# Patient Record
Sex: MALE | Race: WHITE | NOT HISPANIC OR LATINO | Employment: FULL TIME | URBAN - METROPOLITAN AREA
[De-identification: names, ages, dates, MRNs, and addresses within clinical notes are randomized per-mention and may not be internally consistent; named-entity substitution may affect disease eponyms.]

---

## 2023-01-27 PROBLEM — E78.00 HYPERCHOLESTEROLEMIA: Status: ACTIVE | Noted: 2023-01-27

## 2023-01-27 PROBLEM — I10 HYPERTENSION: Status: ACTIVE | Noted: 2023-01-27

## 2023-01-27 PROBLEM — C91.10 CLL (CHRONIC LYMPHOCYTIC LEUKEMIA) (MULTI): Status: ACTIVE | Noted: 2023-01-27

## 2023-01-27 PROBLEM — E66.812 CLASS 2 SEVERE OBESITY DUE TO EXCESS CALORIES WITH SERIOUS COMORBIDITY AND BODY MASS INDEX (BMI) OF 38.0 TO 38.9 IN ADULT: Status: ACTIVE | Noted: 2023-01-27

## 2023-01-27 PROBLEM — K76.0 FATTY LIVER: Status: ACTIVE | Noted: 2023-01-27

## 2023-01-27 PROBLEM — J40 SINOBRONCHITIS: Status: ACTIVE | Noted: 2023-01-27

## 2023-01-27 PROBLEM — R79.89 ELEVATED LFTS: Status: ACTIVE | Noted: 2023-01-27

## 2023-01-27 PROBLEM — J11.1 FLU: Status: ACTIVE | Noted: 2023-01-27

## 2023-01-27 PROBLEM — E66.01 CLASS 2 SEVERE OBESITY DUE TO EXCESS CALORIES WITH SERIOUS COMORBIDITY AND BODY MASS INDEX (BMI) OF 38.0 TO 38.9 IN ADULT (MULTI): Status: ACTIVE | Noted: 2023-01-27

## 2023-01-27 PROBLEM — J32.9 SINOBRONCHITIS: Status: ACTIVE | Noted: 2023-01-27

## 2023-01-27 PROBLEM — D17.1 LIPOMA OF CHEST WALL: Status: ACTIVE | Noted: 2023-01-27

## 2023-01-27 PROBLEM — R22.2 MASS OF CHEST WALL: Status: ACTIVE | Noted: 2023-01-27

## 2023-01-27 PROBLEM — R91.1 PULMONARY NODULE: Status: ACTIVE | Noted: 2023-01-27

## 2023-01-27 PROBLEM — J30.9 ALLERGIC RHINITIS: Status: ACTIVE | Noted: 2023-01-27

## 2023-01-27 PROBLEM — F41.9 ANXIETY DISORDER: Status: ACTIVE | Noted: 2023-01-27

## 2023-01-27 RX ORDER — LISINOPRIL 40 MG/1
40 TABLET ORAL DAILY
COMMUNITY
Start: 2014-12-22 | End: 2023-04-14 | Stop reason: SDUPTHER

## 2023-01-27 RX ORDER — ESCITALOPRAM OXALATE 10 MG/1
10 TABLET ORAL DAILY
COMMUNITY
Start: 2022-02-15 | End: 2023-04-14 | Stop reason: SDUPTHER

## 2023-01-27 RX ORDER — AMLODIPINE BESYLATE 10 MG/1
10 TABLET ORAL DAILY
COMMUNITY
Start: 2021-04-12 | End: 2023-04-14 | Stop reason: SDUPTHER

## 2023-01-27 RX ORDER — FENOFIBRIC ACID 135 MG/1
135 CAPSULE, DELAYED RELEASE ORAL DAILY
COMMUNITY
Start: 2015-01-26 | End: 2023-04-14 | Stop reason: SDUPTHER

## 2023-03-13 ENCOUNTER — APPOINTMENT (OUTPATIENT)
Dept: PRIMARY CARE | Facility: CLINIC | Age: 48
End: 2023-03-13
Payer: COMMERCIAL

## 2023-04-14 ENCOUNTER — OFFICE VISIT (OUTPATIENT)
Dept: PRIMARY CARE | Facility: CLINIC | Age: 48
End: 2023-04-14
Payer: COMMERCIAL

## 2023-04-14 VITALS
BODY MASS INDEX: 36.88 KG/M2 | HEART RATE: 89 BPM | SYSTOLIC BLOOD PRESSURE: 140 MMHG | HEIGHT: 70 IN | WEIGHT: 257.6 LBS | TEMPERATURE: 97.6 F | DIASTOLIC BLOOD PRESSURE: 98 MMHG

## 2023-04-14 DIAGNOSIS — I10 HYPERTENSION, UNSPECIFIED TYPE: Primary | ICD-10-CM

## 2023-04-14 DIAGNOSIS — F41.1 GENERALIZED ANXIETY DISORDER: ICD-10-CM

## 2023-04-14 DIAGNOSIS — E78.00 HYPERCHOLESTEROLEMIA: ICD-10-CM

## 2023-04-14 PROBLEM — J32.9 SINOBRONCHITIS: Status: RESOLVED | Noted: 2023-01-27 | Resolved: 2023-04-14

## 2023-04-14 PROBLEM — J40 SINOBRONCHITIS: Status: RESOLVED | Noted: 2023-01-27 | Resolved: 2023-04-14

## 2023-04-14 PROCEDURE — 1036F TOBACCO NON-USER: CPT | Performed by: FAMILY MEDICINE

## 2023-04-14 PROCEDURE — 99214 OFFICE O/P EST MOD 30 MIN: CPT | Performed by: FAMILY MEDICINE

## 2023-04-14 PROCEDURE — 3077F SYST BP >= 140 MM HG: CPT | Performed by: FAMILY MEDICINE

## 2023-04-14 PROCEDURE — 3080F DIAST BP >= 90 MM HG: CPT | Performed by: FAMILY MEDICINE

## 2023-04-14 RX ORDER — ESCITALOPRAM OXALATE 10 MG/1
10 TABLET ORAL DAILY
Qty: 30 TABLET | Refills: 6 | Status: SHIPPED | OUTPATIENT
Start: 2023-04-14 | End: 2023-11-13

## 2023-04-14 RX ORDER — AMLODIPINE BESYLATE 10 MG/1
10 TABLET ORAL DAILY
Qty: 30 TABLET | Refills: 6 | Status: SHIPPED | OUTPATIENT
Start: 2023-04-14 | End: 2023-11-13

## 2023-04-14 RX ORDER — LISINOPRIL 40 MG/1
60 TABLET ORAL DAILY
Qty: 45 TABLET | Refills: 6 | Status: SHIPPED | OUTPATIENT
Start: 2023-04-14 | End: 2023-11-13

## 2023-04-14 RX ORDER — FENOFIBRIC ACID 135 MG/1
135 CAPSULE, DELAYED RELEASE ORAL DAILY
Qty: 30 CAPSULE | Refills: 6 | Status: SHIPPED | OUTPATIENT
Start: 2023-04-14 | End: 2023-11-13

## 2023-04-14 NOTE — PROGRESS NOTES
Brandon Snider is a 48 y.o. male here today for recheck.    HTN recheck -- Patient denies chest pain, SOB, edema, palpitations on review.  Taking medication correctly and denies any side effects.  His blood pressure is elevated today in the office.  He is not checking his blood pressure at home.  He says that he is taking his medications correctly and he took them today and yesterday.    HLD recheck -- Patient taking medications correctly.  No SE's of muscle pain or joint pain.  No CP, edema, myalgias.      Mood disorder recheck -- Patient feels like condition is well controlled.  Has good control of mood and emotional reactions.  No SE's or problems with medications.  No homicidal or suicidal ideation.  Patient wishes to continue same medications.        Objective    Visit Vitals  BP (!) 140/98   Pulse 89   Temp 36.4 °C (97.6 °F)     Body mass index is 36.96 kg/m².     Physical Exam   General - Not in acute distress and cooperative.  Build & Nutrition - Well developed  Posture - Normal  Gait - Normal  Mental Status - alert and oriented x 3    Head - Normocephalic    Neck - Thyroid normal size    Eyes - Bilateral - Sclera clear and lids pink without edema or mass.      Skin - Warm and dry with no rashes on visible skin    Lungs - Clear to auscultation and normal breathing effort    Cardiovascular - RRR and no murmurs, rubs or thrill.    Peripheral Vascular - Bilateral - no edema present    Neuropsychiatric - normal mood and affect        Assessment    1. Hypertension, unspecified type  lisinopril 40 mg tablet, amLODIPine (Norvasc) 10 mg tablet   Suboptimal control of condition.  Will modify treatment by increasing lisinopril to 60 mg daily.  He will take 1-1/2 tablets of 40 mg.  He will continue the same amlodipine 10 mg dose.  We will recheck in 1 month.  We discussed diet changes and weight loss as ways to also help lower the blood pressure.     2. Hypercholesterolemia  choline fenofibrate (Trilipix) 135 mg   capsule   Condition well controlled.  No change in current treatment regimen.  Refill given of current medication.  Appropriate labs ordered or reviewed.  Make a follow up appointment with me for recheck in 6 months.     3. Generalized anxiety disorder  escitalopram (Lexapro) 10 mg tablet   Condition well controlled.  No change in current treatment regimen.  Refill given of current medication.  Make a follow up appointment with me for recheck in 6 months.

## 2023-05-15 ENCOUNTER — OFFICE VISIT (OUTPATIENT)
Dept: PRIMARY CARE | Facility: CLINIC | Age: 48
End: 2023-05-15
Payer: COMMERCIAL

## 2023-05-15 VITALS
WEIGHT: 258 LBS | HEART RATE: 78 BPM | RESPIRATION RATE: 18 BRPM | BODY MASS INDEX: 36.94 KG/M2 | TEMPERATURE: 97.9 F | SYSTOLIC BLOOD PRESSURE: 138 MMHG | DIASTOLIC BLOOD PRESSURE: 94 MMHG | HEIGHT: 70 IN

## 2023-05-15 DIAGNOSIS — C91.10 CLL (CHRONIC LYMPHOCYTIC LEUKEMIA) (MULTI): ICD-10-CM

## 2023-05-15 DIAGNOSIS — I10 PRIMARY HYPERTENSION: Primary | ICD-10-CM

## 2023-05-15 PROCEDURE — 99213 OFFICE O/P EST LOW 20 MIN: CPT | Performed by: FAMILY MEDICINE

## 2023-05-15 PROCEDURE — 3075F SYST BP GE 130 - 139MM HG: CPT | Performed by: FAMILY MEDICINE

## 2023-05-15 PROCEDURE — 1036F TOBACCO NON-USER: CPT | Performed by: FAMILY MEDICINE

## 2023-05-15 PROCEDURE — 3080F DIAST BP >= 90 MM HG: CPT | Performed by: FAMILY MEDICINE

## 2023-05-15 RX ORDER — HYDROCHLOROTHIAZIDE 12.5 MG/1
12.5 TABLET ORAL DAILY
Qty: 30 TABLET | Refills: 2 | Status: SHIPPED | OUTPATIENT
Start: 2023-05-15 | End: 2023-06-15 | Stop reason: SDUPTHER

## 2023-05-15 ASSESSMENT — ENCOUNTER SYMPTOMS: HYPERTENSION: 1

## 2023-05-15 NOTE — PROGRESS NOTES
Brandon Snider is a 48 y.o. male here today for   Chief Complaint   Patient presents with    Hypertension        Hypertension  This is a chronic problem. The current episode started more than 1 year ago.      We increased Lisinopril to 60 mg one month ago.  But his blood pressure is still slightly elevated today in the office.  He says at home it is still running in the 130s to 140s over 80s to 90s..  He denies any chest pain or heart palpitations.    Objective    Visit Vitals  BP (!) 138/94   Pulse 78   Temp 36.6 °C (97.9 °F)   Resp 18     Body mass index is 37.02 kg/m².     Physical Exam   General - Not in acute distress and cooperative.  Build & Nutrition - Well developed  Posture - Normal  Gait - Normal  Mental Status - alert and oriented x 3    Head - Normocephalic    Neck - Thyroid normal size    Eyes - Bilateral - Sclera clear and lids pink without edema or mass.      Skin - Warm and dry with no rashes on visible skin    Lungs - Clear to auscultation and normal breathing effort    Cardiovascular - RRR and no murmurs, rubs or thrill.    Peripheral Vascular - Bilateral - no edema present    Neuropsychiatric - normal mood and affect        Assessment    1. Primary hypertension  hydroCHLOROthiazide (HYDRODiuril) 12.5 mg tablet   This is still suboptimally controlled so we will add hydrochlorothiazide 12.5 mg daily.  He will continue the same lisinopril and amlodipine dose.  We discussed how weight loss and better diet may also help lower his blood pressure further.  We will follow-up in 1 month.     2. CLL (chronic lymphocytic leukemia) (CMS/HCC)     He is not currently on any treatment and this is being monitored closely by his hematologist Dr. Zavala.  He will follow-up with him every 6 to 12 months.

## 2023-06-15 ENCOUNTER — OFFICE VISIT (OUTPATIENT)
Dept: PRIMARY CARE | Facility: CLINIC | Age: 48
End: 2023-06-15
Payer: COMMERCIAL

## 2023-06-15 VITALS
DIASTOLIC BLOOD PRESSURE: 86 MMHG | BODY MASS INDEX: 36.94 KG/M2 | RESPIRATION RATE: 18 BRPM | WEIGHT: 258 LBS | HEIGHT: 70 IN | HEART RATE: 82 BPM | TEMPERATURE: 98 F | SYSTOLIC BLOOD PRESSURE: 134 MMHG

## 2023-06-15 DIAGNOSIS — I10 PRIMARY HYPERTENSION: Primary | ICD-10-CM

## 2023-06-15 DIAGNOSIS — E78.00 HYPERCHOLESTEROLEMIA: ICD-10-CM

## 2023-06-15 PROCEDURE — 3079F DIAST BP 80-89 MM HG: CPT | Performed by: FAMILY MEDICINE

## 2023-06-15 PROCEDURE — 1036F TOBACCO NON-USER: CPT | Performed by: FAMILY MEDICINE

## 2023-06-15 PROCEDURE — 3075F SYST BP GE 130 - 139MM HG: CPT | Performed by: FAMILY MEDICINE

## 2023-06-15 PROCEDURE — 99213 OFFICE O/P EST LOW 20 MIN: CPT | Performed by: FAMILY MEDICINE

## 2023-06-15 RX ORDER — HYDROCHLOROTHIAZIDE 12.5 MG/1
12.5 TABLET ORAL DAILY
Qty: 30 TABLET | Refills: 6 | Status: SHIPPED | OUTPATIENT
Start: 2023-06-15 | End: 2023-11-28 | Stop reason: ALTCHOICE

## 2023-06-15 ASSESSMENT — ENCOUNTER SYMPTOMS: HYPERTENSION: 1

## 2023-06-15 NOTE — PROGRESS NOTES
Brandon Snider is a 48 y.o. male here today for   Chief Complaint   Patient presents with    Hypertension   BP at home 130s/70s     Hypertension  This is a chronic problem. The current episode started more than 1 year ago. The problem is controlled.    No SE's with hydrochlorothiazide since we added it.  His home readings have improved a lot.  Now 130/70's consistently.  The patient denies any chest pain or heart palpitations.    He is interested in screening for early coronary artery disease since there is some family history of this.  He has never had exertional chest pain or heart palpitations or any other anginal equivalent.    Objective    Visit Vitals  /86   Pulse 82   Temp 36.7 °C (98 °F)   Resp 18     Body mass index is 37.02 kg/m².     Physical Exam   General - Not in acute distress and cooperative.  Build & Nutrition - Well developed  Posture - Normal  Gait - Normal  Mental Status - alert and oriented x 3    Head - Normocephalic    Neck - Thyroid normal size    Eyes - Bilateral - Sclera clear and lids pink without edema or mass.      Skin - Warm and dry with no rashes on visible skin    Lungs - Clear to auscultation and normal breathing effort    Cardiovascular - RRR and no murmurs, rubs or thrill.    Peripheral Vascular - Bilateral - no edema present    Neuropsychiatric - normal mood and affect        Assessment    1. Primary hypertension  CT cardiac scoring wo IV contrast, hydroCHLOROthiazide (HYDRODiuril) 12.5 mg tablet, CT cardiac scoring wo IV contrast   The patient's blood pressure now seems well controlled since we added hydrochlorothiazide last month.  No change in current treatment regimen.  Refill given of current medication.  I have ordered a CT scan for cardiac calcium scoring at the patient's request.  We will call him with results and follow-up accordingly.  Otherwise we will follow-up in 6 months.     2. Hypercholesterolemia  CT cardiac scoring wo IV contrast, CT cardiac scoring wo  IV contrast   He has not been on medications for this ever.

## 2023-08-04 ENCOUNTER — TELEPHONE (OUTPATIENT)
Dept: PRIMARY CARE | Facility: CLINIC | Age: 48
End: 2023-08-04
Payer: COMMERCIAL

## 2023-08-04 NOTE — TELEPHONE ENCOUNTER
----- Message from Bernard Hernandez MD sent at 8/4/2023 12:27 PM EDT -----  Please tell the patient that his cardiac CT scan was very good.  His score was 6 which is considered very low risk for recurrent coronary artery disease.  This score can range from 0 to over 800.

## 2023-08-04 NOTE — RESULT ENCOUNTER NOTE
Please tell the patient that his cardiac CT scan was very good.  His score was 6 which is considered very low risk for recurrent coronary artery disease.  This score can range from 0 to over 800.

## 2023-11-12 DIAGNOSIS — I10 HYPERTENSION, UNSPECIFIED TYPE: ICD-10-CM

## 2023-11-12 DIAGNOSIS — E78.00 HYPERCHOLESTEROLEMIA: ICD-10-CM

## 2023-11-12 DIAGNOSIS — F41.1 GENERALIZED ANXIETY DISORDER: ICD-10-CM

## 2023-11-13 RX ORDER — LISINOPRIL 40 MG/1
60 TABLET ORAL DAILY
Qty: 45 TABLET | Refills: 0 | Status: SHIPPED | OUTPATIENT
Start: 2023-11-13 | End: 2023-12-14 | Stop reason: SDUPTHER

## 2023-11-13 RX ORDER — AMLODIPINE BESYLATE 10 MG/1
10 TABLET ORAL DAILY
Qty: 30 TABLET | Refills: 0 | Status: SHIPPED | OUTPATIENT
Start: 2023-11-13 | End: 2023-12-14 | Stop reason: SDUPTHER

## 2023-11-13 RX ORDER — ESCITALOPRAM OXALATE 10 MG/1
10 TABLET ORAL DAILY
Qty: 30 TABLET | Refills: 0 | Status: SHIPPED | OUTPATIENT
Start: 2023-11-13 | End: 2023-12-14 | Stop reason: SDUPTHER

## 2023-11-13 RX ORDER — FENOFIBRIC ACID 135 MG/1
135 CAPSULE, DELAYED RELEASE ORAL DAILY
Qty: 30 CAPSULE | Refills: 0 | Status: SHIPPED | OUTPATIENT
Start: 2023-11-13 | End: 2023-12-14 | Stop reason: SDUPTHER

## 2023-11-27 ENCOUNTER — TELEPHONE (OUTPATIENT)
Dept: HEMATOLOGY/ONCOLOGY | Facility: CLINIC | Age: 48
End: 2023-11-27
Payer: COMMERCIAL

## 2023-11-27 ENCOUNTER — DOCUMENTATION (OUTPATIENT)
Dept: PRIMARY CARE | Facility: CLINIC | Age: 48
End: 2023-11-27
Payer: COMMERCIAL

## 2023-11-27 NOTE — PROGRESS NOTES
Discharge Facility:  [facility]  Discharge Diagnosis: Right lower quadrant abdominal pain   Admission Date: 11/23/2023  Discharge Date:  11/25/2023    PCP Appointment Date: 11/28/2023  Specialist Appointment Date: hem/onc 1/10/2024  Hospital Encounter and Summary: Linked      No TCM call completed. Hospital follow up within 2 business days of discharge.

## 2023-11-28 ENCOUNTER — LAB (OUTPATIENT)
Dept: LAB | Facility: LAB | Age: 48
End: 2023-11-28
Payer: COMMERCIAL

## 2023-11-28 ENCOUNTER — OFFICE VISIT (OUTPATIENT)
Dept: PRIMARY CARE | Facility: CLINIC | Age: 48
End: 2023-11-28
Payer: COMMERCIAL

## 2023-11-28 VITALS
HEART RATE: 84 BPM | SYSTOLIC BLOOD PRESSURE: 132 MMHG | TEMPERATURE: 97 F | WEIGHT: 249 LBS | DIASTOLIC BLOOD PRESSURE: 84 MMHG | HEIGHT: 70 IN | OXYGEN SATURATION: 97 % | RESPIRATION RATE: 18 BRPM | BODY MASS INDEX: 35.65 KG/M2

## 2023-11-28 DIAGNOSIS — R18.8 OTHER ASCITES: ICD-10-CM

## 2023-11-28 DIAGNOSIS — K85.00 IDIOPATHIC ACUTE PANCREATITIS WITHOUT INFECTION OR NECROSIS (HHS-HCC): Primary | ICD-10-CM

## 2023-11-28 DIAGNOSIS — K85.00 IDIOPATHIC ACUTE PANCREATITIS WITHOUT INFECTION OR NECROSIS (HHS-HCC): ICD-10-CM

## 2023-11-28 DIAGNOSIS — I10 PRIMARY HYPERTENSION: ICD-10-CM

## 2023-11-28 DIAGNOSIS — C91.10 CLL (CHRONIC LYMPHOCYTIC LEUKEMIA) (MULTI): ICD-10-CM

## 2023-11-28 PROBLEM — F41.1 GENERALIZED ANXIETY DISORDER: Status: ACTIVE | Noted: 2023-01-27

## 2023-11-28 PROBLEM — K29.70 GASTRITIS: Status: ACTIVE | Noted: 2023-11-24

## 2023-11-28 PROBLEM — R10.84 GENERALIZED ABDOMINAL PAIN: Status: ACTIVE | Noted: 2023-11-23

## 2023-11-28 PROBLEM — E66.812 OBESITY, CLASS II, BMI 35-39.9: Status: ACTIVE | Noted: 2023-11-25

## 2023-11-28 PROBLEM — R93.89 ABNORMAL CT SCAN: Status: ACTIVE | Noted: 2023-11-23

## 2023-11-28 PROBLEM — E66.9 OBESITY, CLASS II, BMI 35-39.9: Status: ACTIVE | Noted: 2023-11-25

## 2023-11-28 LAB
BASOPHILS # BLD AUTO: 0.14 X10*3/UL (ref 0–0.1)
BASOPHILS NFR BLD AUTO: 0.4 %
EOSINOPHIL # BLD AUTO: 0.73 X10*3/UL (ref 0–0.7)
EOSINOPHIL NFR BLD AUTO: 2.2 %
ERYTHROCYTE [DISTWIDTH] IN BLOOD BY AUTOMATED COUNT: 13.2 % (ref 11.5–14.5)
HCT VFR BLD AUTO: 36.8 % (ref 41–52)
HGB BLD-MCNC: 11.7 G/DL (ref 13.5–17.5)
IMM GRANULOCYTES # BLD AUTO: 0.23 X10*3/UL (ref 0–0.7)
IMM GRANULOCYTES NFR BLD AUTO: 0.7 % (ref 0–0.9)
LYMPHOCYTES # BLD AUTO: 23.89 X10*3/UL (ref 1.2–4.8)
LYMPHOCYTES NFR BLD AUTO: 71.1 %
MCH RBC QN AUTO: 28.1 PG (ref 26–34)
MCHC RBC AUTO-ENTMCNC: 31.8 G/DL (ref 32–36)
MCV RBC AUTO: 89 FL (ref 80–100)
MONOCYTES # BLD AUTO: 2.48 X10*3/UL (ref 0.1–1)
MONOCYTES NFR BLD AUTO: 7.4 %
NEUTROPHILS # BLD AUTO: 6.13 X10*3/UL (ref 1.2–7.7)
NEUTROPHILS NFR BLD AUTO: 18.2 %
NRBC BLD-RTO: 0 /100 WBCS (ref 0–0)
PLATELET # BLD AUTO: 357 X10*3/UL (ref 150–450)
RBC # BLD AUTO: 4.16 X10*6/UL (ref 4.5–5.9)
WBC # BLD AUTO: 33.6 X10*3/UL (ref 4.4–11.3)

## 2023-11-28 PROCEDURE — 3079F DIAST BP 80-89 MM HG: CPT | Performed by: FAMILY MEDICINE

## 2023-11-28 PROCEDURE — 85025 COMPLETE CBC W/AUTO DIFF WBC: CPT

## 2023-11-28 PROCEDURE — 80053 COMPREHEN METABOLIC PANEL: CPT

## 2023-11-28 PROCEDURE — 83690 ASSAY OF LIPASE: CPT

## 2023-11-28 PROCEDURE — 3075F SYST BP GE 130 - 139MM HG: CPT | Performed by: FAMILY MEDICINE

## 2023-11-28 PROCEDURE — 99215 OFFICE O/P EST HI 40 MIN: CPT | Performed by: FAMILY MEDICINE

## 2023-11-28 PROCEDURE — 1036F TOBACCO NON-USER: CPT | Performed by: FAMILY MEDICINE

## 2023-11-28 PROCEDURE — 36415 COLL VENOUS BLD VENIPUNCTURE: CPT

## 2023-11-28 NOTE — PROGRESS NOTES
Brandon Snider is a 48 y.o. male here today for No chief complaint on file.       HPI         Current Outpatient Medications:     amLODIPine (Norvasc) 10 mg tablet, TAKE 1 TABLET BY MOUTH ONCE DAILY, Disp: 30 tablet, Rfl: 0    choline fenofibrate (Trilipix) 135 mg DR capsule, TAKE 1 CAPSULE BY MOUTH ONCE DAILY, Disp: 30 capsule, Rfl: 0    escitalopram (Lexapro) 10 mg tablet, TAKE 1 TABLET BY MOUTH ONCE DAILY, Disp: 30 tablet, Rfl: 0    hydroCHLOROthiazide (HYDRODiuril) 12.5 mg tablet, Take 1 tablet (12.5 mg) by mouth once daily., Disp: 30 tablet, Rfl: 6    lisinopril 40 mg tablet, take 1 and 1/2 TABLETS BY MOUTH ONCE DAILY, Disp: 45 tablet, Rfl: 0  No current facility-administered medications for this visit.    Patient Active Problem List   Diagnosis    Allergic rhinitis    Generalized anxiety disorder    CLL (chronic lymphocytic leukemia) (CMS/HCC)    Elevated LFTs    Fatty liver    Flu    Hypercholesterolemia    Primary hypertension    Lipoma of chest wall    Mass of chest wall    Pulmonary nodule    Class 2 severe obesity due to excess calories with serious comorbidity and body mass index (BMI) of 38.0 to 38.9 in adult (CMS/HCC)    Idiopathic acute pancreatitis without infection or necrosis    Other ascites         No results found for this or any previous visit (from the past 672 hour(s)).     Objective    Visit Vitals  There were no vitals taken for this visit.    There is no height or weight on file to calculate BMI.     Physical Exam       Assessment    No diagnosis found.

## 2023-11-28 NOTE — PROGRESS NOTES
Brandon Snider is a 48 y.o. male here today for   Chief Complaint   Patient presents with    Hospital Follow-up   Hospital follow up from 11/23-11/25 for abdominal pain , dx with pancreatitis.      HPI     Patient was admitted to [facility] 11/23/2023 until 11/25/2023 for lower diffuse abdominal pain.  He was diagnosed with idiopathic pancreatitis.  There was also ascites noted.  The patient says they were not sure what was causing the abdominal pain at first but eventually decided it was pancreatitis because of elevated lipase.  I was able to review some of the records from [facility].  His lipase was initially 226 but improved to 71 on November 24.  His white cell count was also elevated up to 46 but his baseline is 21 secondary to CLL.  They recommend that he discontinue hydrochlorothiazide but continued his other medications the same.  They were also recommending a percutaneous needle biopsy of lymph nodes and ascites fluid through radiology.  Patient did d/w Dr. Zavala after discharge and he recommend to not get the biopsy and he thought the hydrochlorothiazide may be the cause.  There was no source of infection found.    He now is not having any abdominal pain.  He denies any fever or chills or nausea or vomiting.  He has a fu with GI in January.  He would rather see a  GI doctor.       Current Outpatient Medications:     amLODIPine (Norvasc) 10 mg tablet, TAKE 1 TABLET BY MOUTH ONCE DAILY, Disp: 30 tablet, Rfl: 0    choline fenofibrate (Trilipix) 135 mg DR capsule, TAKE 1 CAPSULE BY MOUTH ONCE DAILY, Disp: 30 capsule, Rfl: 0    escitalopram (Lexapro) 10 mg tablet, TAKE 1 TABLET BY MOUTH ONCE DAILY, Disp: 30 tablet, Rfl: 0    lisinopril 40 mg tablet, take 1 and 1/2 TABLETS BY MOUTH ONCE DAILY, Disp: 45 tablet, Rfl: 0  No current facility-administered medications for this visit.    Patient Active Problem List   Diagnosis    Allergic rhinitis    Generalized anxiety disorder    CLL (chronic lymphocytic leukemia) (CMS/[facility])     Elevated LFTs    Fatty liver    Flu    Hypercholesterolemia    Primary hypertension    Lipoma of chest wall    Mass of chest wall    Pulmonary nodule    Class 2 severe obesity due to excess calories with serious comorbidity and body mass index (BMI) of 38.0 to 38.9 in adult (CMS/HCC)    Idiopathic acute pancreatitis without infection or necrosis    Other ascites    Obesity, Class II, BMI 35-39.9    Generalized abdominal pain    Gastritis    Abnormal CT scan         Recent Results (from the past 672 hour(s))   Lipase    Collection Time: 11/28/23  2:23 PM   Result Value Ref Range    Lipase 35 9 - 82 U/L   Comprehensive metabolic panel    Collection Time: 11/28/23  2:23 PM   Result Value Ref Range    Glucose 88 74 - 99 mg/dL    Sodium 136 136 - 145 mmol/L    Potassium 4.3 3.5 - 5.3 mmol/L    Chloride 100 98 - 107 mmol/L    Bicarbonate 27 21 - 32 mmol/L    Anion Gap 13 10 - 20 mmol/L    Urea Nitrogen 11 6 - 23 mg/dL    Creatinine 0.74 0.50 - 1.30 mg/dL    eGFR >90 >60 mL/min/1.73m*2    Calcium 9.0 8.6 - 10.3 mg/dL    Albumin 4.3 3.4 - 5.0 g/dL    Alkaline Phosphatase 111 33 - 120 U/L    Total Protein 6.6 6.4 - 8.2 g/dL    AST 28 9 - 39 U/L    Bilirubin, Total 0.4 0.0 - 1.2 mg/dL    ALT 74 (H) 10 - 52 U/L   CBC and Auto Differential    Collection Time: 11/28/23  2:23 PM   Result Value Ref Range    WBC 33.6 (H) 4.4 - 11.3 x10*3/uL    nRBC 0.0 0.0 - 0.0 /100 WBCs    RBC 4.16 (L) 4.50 - 5.90 x10*6/uL    Hemoglobin 11.7 (L) 13.5 - 17.5 g/dL    Hematocrit 36.8 (L) 41.0 - 52.0 %    MCV 89 80 - 100 fL    MCH 28.1 26.0 - 34.0 pg    MCHC 31.8 (L) 32.0 - 36.0 g/dL    RDW 13.2 11.5 - 14.5 %    Platelets 357 150 - 450 x10*3/uL    Neutrophils % 18.2 40.0 - 80.0 %    Immature Granulocytes %, Automated 0.7 0.0 - 0.9 %    Lymphocytes % 71.1 13.0 - 44.0 %    Monocytes % 7.4 2.0 - 10.0 %    Eosinophils % 2.2 0.0 - 6.0 %    Basophils % 0.4 0.0 - 2.0 %    Neutrophils Absolute 6.13 1.20 - 7.70 x10*3/uL    Immature Granulocytes Absolute,  "Automated 0.23 0.00 - 0.70 x10*3/uL    Lymphocytes Absolute 23.89 (H) 1.20 - 4.80 x10*3/uL    Monocytes Absolute 2.48 (H) 0.10 - 1.00 x10*3/uL    Eosinophils Absolute 0.73 (H) 0.00 - 0.70 x10*3/uL    Basophils Absolute 0.14 (H) 0.00 - 0.10 x10*3/uL        Objective    Visit Vitals  /84   Pulse 84   Temp 36.1 °C (97 °F)   Resp 18   Ht 1.778 m (5' 10\")   Wt 113 kg (249 lb)   SpO2 97%   BMI 35.73 kg/m²     Body mass index is 35.73 kg/m².     Physical Exam   General - Not in acute distress and cooperative.  Build & Nutrition - Well developed  Posture - Normal  Gait - Normal  Mental Status - alert and oriented x 3    Head - Normocephalic    Neck - Thyroid normal size    Eyes - Bilateral - Sclera clear and lids pink without edema or mass.      Skin - Warm and dry with no rashes on visible skin    Lungs - Clear to auscultation and normal breathing effort    Cardiovascular - RRR and no murmurs, rubs or thrill.    Peripheral Vascular - Bilateral - no edema present    Neuropsychiatric - normal mood and affect    Abdomen-soft and nontender and nondistended with normal bowel sounds throughout.    Assessment    1. Idiopathic acute pancreatitis without infection or necrosis  Lipase, Comprehensive metabolic panel, CBC and Auto Differential, Referral to Gastroenterology, CANCELED: Referral to Gastroenterology   After reviewing the available records I think it is possible that the hydrochlorothiazide caused pancreatitis.  We did started about 6 months ago as an add-on for hypertension.  It has been discontinued and I recommend that he stay off of this medication and I am going to listed as a intolerance.  I am going to recheck his lipase and CMP and CBC.  At his request I will refer him to a gastroenterologist through [facility] for further evaluation and management.  Currently he is asymptomatic and has been eating a nearly normal diet.  I recommend that he try to eat a low-fat diet for now and avoid all " alcohol.  I will defer to gastroenterology for any further recommendations regarding the above-mentioned ascites.     2. Other ascites  Lipase, Comprehensive metabolic panel, CBC and Auto Differential, Referral to Gastroenterology, CANCELED: Referral to Gastroenterology      3. CLL (chronic lymphocytic leukemia) (CMS/HCC)     This is being managed by Dr. Zavala as above.  I will recheck a CBC with differential.     4. Primary hypertension     His hydrochlorothiazide was discontinued in the hospital as above.  His blood pressure today in the office is acceptable so we will not modify his medications at this time.  Make a follow up appointment with me for recheck in 6 months.

## 2023-11-29 LAB
ALBUMIN SERPL BCP-MCNC: 4.3 G/DL (ref 3.4–5)
ALP SERPL-CCNC: 111 U/L (ref 33–120)
ALT SERPL W P-5'-P-CCNC: 74 U/L (ref 10–52)
ANION GAP SERPL CALC-SCNC: 13 MMOL/L (ref 10–20)
AST SERPL W P-5'-P-CCNC: 28 U/L (ref 9–39)
BILIRUB SERPL-MCNC: 0.4 MG/DL (ref 0–1.2)
BUN SERPL-MCNC: 11 MG/DL (ref 6–23)
CALCIUM SERPL-MCNC: 9 MG/DL (ref 8.6–10.3)
CHLORIDE SERPL-SCNC: 100 MMOL/L (ref 98–107)
CO2 SERPL-SCNC: 27 MMOL/L (ref 21–32)
CREAT SERPL-MCNC: 0.74 MG/DL (ref 0.5–1.3)
GFR SERPL CREATININE-BSD FRML MDRD: >90 ML/MIN/1.73M*2
GLUCOSE SERPL-MCNC: 88 MG/DL (ref 74–99)
LIPASE SERPL-CCNC: 35 U/L (ref 9–82)
POTASSIUM SERPL-SCNC: 4.3 MMOL/L (ref 3.5–5.3)
PROT SERPL-MCNC: 6.6 G/DL (ref 6.4–8.2)
SODIUM SERPL-SCNC: 136 MMOL/L (ref 136–145)

## 2023-12-01 ENCOUNTER — PATIENT OUTREACH (OUTPATIENT)
Dept: PRIMARY CARE | Facility: CLINIC | Age: 48
End: 2023-12-01
Payer: COMMERCIAL

## 2023-12-01 NOTE — PROGRESS NOTES
Unable to reach patient for call back after patient's follow up appointment with PCP.  11/28/2023  LVM with call back number for patient to call if needed

## 2023-12-14 ENCOUNTER — OFFICE VISIT (OUTPATIENT)
Dept: PRIMARY CARE | Facility: CLINIC | Age: 48
End: 2023-12-14
Payer: COMMERCIAL

## 2023-12-14 VITALS
SYSTOLIC BLOOD PRESSURE: 122 MMHG | WEIGHT: 246 LBS | TEMPERATURE: 96.7 F | HEIGHT: 70 IN | RESPIRATION RATE: 16 BRPM | BODY MASS INDEX: 35.22 KG/M2 | HEART RATE: 76 BPM | DIASTOLIC BLOOD PRESSURE: 80 MMHG

## 2023-12-14 DIAGNOSIS — K85.00 IDIOPATHIC ACUTE PANCREATITIS WITHOUT INFECTION OR NECROSIS (HHS-HCC): Primary | ICD-10-CM

## 2023-12-14 DIAGNOSIS — I10 PRIMARY HYPERTENSION: ICD-10-CM

## 2023-12-14 DIAGNOSIS — E78.00 HYPERCHOLESTEROLEMIA: ICD-10-CM

## 2023-12-14 DIAGNOSIS — F41.1 GENERALIZED ANXIETY DISORDER: ICD-10-CM

## 2023-12-14 PROCEDURE — 3079F DIAST BP 80-89 MM HG: CPT | Performed by: FAMILY MEDICINE

## 2023-12-14 PROCEDURE — 1036F TOBACCO NON-USER: CPT | Performed by: FAMILY MEDICINE

## 2023-12-14 PROCEDURE — 90471 IMMUNIZATION ADMIN: CPT | Performed by: FAMILY MEDICINE

## 2023-12-14 PROCEDURE — 3074F SYST BP LT 130 MM HG: CPT | Performed by: FAMILY MEDICINE

## 2023-12-14 PROCEDURE — 99214 OFFICE O/P EST MOD 30 MIN: CPT | Performed by: FAMILY MEDICINE

## 2023-12-14 PROCEDURE — 90750 HZV VACC RECOMBINANT IM: CPT | Performed by: FAMILY MEDICINE

## 2023-12-14 RX ORDER — LISINOPRIL 40 MG/1
60 TABLET ORAL DAILY
Qty: 45 TABLET | Refills: 6 | Status: SHIPPED | OUTPATIENT
Start: 2023-12-14

## 2023-12-14 RX ORDER — ESCITALOPRAM OXALATE 10 MG/1
10 TABLET ORAL DAILY
Qty: 30 TABLET | Refills: 6 | Status: SHIPPED | OUTPATIENT
Start: 2023-12-14

## 2023-12-14 RX ORDER — FENOFIBRIC ACID 135 MG/1
135 CAPSULE, DELAYED RELEASE ORAL DAILY
Qty: 30 CAPSULE | Refills: 6 | Status: SHIPPED | OUTPATIENT
Start: 2023-12-14

## 2023-12-14 RX ORDER — AMLODIPINE BESYLATE 10 MG/1
10 TABLET ORAL DAILY
Qty: 30 TABLET | Refills: 6 | Status: SHIPPED | OUTPATIENT
Start: 2023-12-14

## 2023-12-14 NOTE — PROGRESS NOTES
Brandon Snider is a 48 y.o. male here today for 6 month follow up.  Chief Complaint   Patient presents with    Hypertension    Anxiety    Hyperlipidemia        HPI   HTN recheck -- Patient denies chest pain, SOB, edema, palpitations on review.  Taking medication correctly and denies any side effects.  He has been working out and exercising now.    No recurrence of pancreatitis sxs.      HLD recheck -- Patient taking medications correctly.  No SE's of muscle pain or joint pain.  No CP, edema, myalgias.    Mood disorder recheck -- Patient feels like condition is well controlled.  Has good control of mood and emotional reactions.  No SE's or problems with medications.  No homicidal or suicidal ideation.  Patient wishes to continue same medications.  He feels like anxiety is well controlled with medications.            Current Outpatient Medications:     amLODIPine (Norvasc) 10 mg tablet, Take 1 tablet (10 mg) by mouth once daily., Disp: 30 tablet, Rfl: 6    choline fenofibrate (Trilipix) 135 mg DR capsule, Take 1 capsule (135 mg) by mouth once daily., Disp: 30 capsule, Rfl: 6    escitalopram (Lexapro) 10 mg tablet, Take 1 tablet (10 mg) by mouth once daily., Disp: 30 tablet, Rfl: 6    lisinopril 40 mg tablet, Take 1.5 tablets (60 mg) by mouth once daily., Disp: 45 tablet, Rfl: 6    Patient Active Problem List   Diagnosis    Allergic rhinitis    Generalized anxiety disorder    CLL (chronic lymphocytic leukemia) (CMS/HCC)    Elevated LFTs    Fatty liver    Flu    Hypercholesterolemia    Primary hypertension    Lipoma of chest wall    Mass of chest wall    Pulmonary nodule    Class 2 severe obesity due to excess calories with serious comorbidity and body mass index (BMI) of 38.0 to 38.9 in adult (CMS/HCC)    Idiopathic acute pancreatitis without infection or necrosis    Other ascites    Obesity, Class II, BMI 35-39.9    Generalized abdominal pain    Gastritis    Abnormal CT scan         Recent Results (from the past 672  "hour(s))   Lipase    Collection Time: 11/28/23  2:23 PM   Result Value Ref Range    Lipase 35 9 - 82 U/L   Comprehensive metabolic panel    Collection Time: 11/28/23  2:23 PM   Result Value Ref Range    Glucose 88 74 - 99 mg/dL    Sodium 136 136 - 145 mmol/L    Potassium 4.3 3.5 - 5.3 mmol/L    Chloride 100 98 - 107 mmol/L    Bicarbonate 27 21 - 32 mmol/L    Anion Gap 13 10 - 20 mmol/L    Urea Nitrogen 11 6 - 23 mg/dL    Creatinine 0.74 0.50 - 1.30 mg/dL    eGFR >90 >60 mL/min/1.73m*2    Calcium 9.0 8.6 - 10.3 mg/dL    Albumin 4.3 3.4 - 5.0 g/dL    Alkaline Phosphatase 111 33 - 120 U/L    Total Protein 6.6 6.4 - 8.2 g/dL    AST 28 9 - 39 U/L    Bilirubin, Total 0.4 0.0 - 1.2 mg/dL    ALT 74 (H) 10 - 52 U/L   CBC and Auto Differential    Collection Time: 11/28/23  2:23 PM   Result Value Ref Range    WBC 33.6 (H) 4.4 - 11.3 x10*3/uL    nRBC 0.0 0.0 - 0.0 /100 WBCs    RBC 4.16 (L) 4.50 - 5.90 x10*6/uL    Hemoglobin 11.7 (L) 13.5 - 17.5 g/dL    Hematocrit 36.8 (L) 41.0 - 52.0 %    MCV 89 80 - 100 fL    MCH 28.1 26.0 - 34.0 pg    MCHC 31.8 (L) 32.0 - 36.0 g/dL    RDW 13.2 11.5 - 14.5 %    Platelets 357 150 - 450 x10*3/uL    Neutrophils % 18.2 40.0 - 80.0 %    Immature Granulocytes %, Automated 0.7 0.0 - 0.9 %    Lymphocytes % 71.1 13.0 - 44.0 %    Monocytes % 7.4 2.0 - 10.0 %    Eosinophils % 2.2 0.0 - 6.0 %    Basophils % 0.4 0.0 - 2.0 %    Neutrophils Absolute 6.13 1.20 - 7.70 x10*3/uL    Immature Granulocytes Absolute, Automated 0.23 0.00 - 0.70 x10*3/uL    Lymphocytes Absolute 23.89 (H) 1.20 - 4.80 x10*3/uL    Monocytes Absolute 2.48 (H) 0.10 - 1.00 x10*3/uL    Eosinophils Absolute 0.73 (H) 0.00 - 0.70 x10*3/uL    Basophils Absolute 0.14 (H) 0.00 - 0.10 x10*3/uL        Objective    Visit Vitals  /80   Pulse 76   Temp 35.9 °C (96.7 °F)   Resp 16   Ht 1.778 m (5' 10\")   Wt 112 kg (246 lb)   BMI 35.30 kg/m²     Body mass index is 35.3 kg/m².     Physical Exam   General - Not in acute distress and " cooperative.  Build & Nutrition - Well developed  Posture - Normal  Gait - Normal  Mental Status - alert and oriented x 3    Head - Normocephalic    Neck - Thyroid normal size    Eyes - Bilateral - Sclera clear and lids pink without edema or mass.      Skin - Warm and dry with no rashes on visible skin    Lungs - Clear to auscultation and normal breathing effort    Cardiovascular - RRR and no murmurs, rubs or thrill.    Peripheral Vascular - Bilateral - no edema present    Neuropsychiatric - normal mood and affect        Assessment    1. Idiopathic acute pancreatitis without infection or necrosis     This seems to have completely resolved.  I think it was very likely secondary to hydrochlorothiazide.  We will continue to monitor and if there is any recurrence of symptoms he will call us right away.     2. Hypercholesterolemia  choline fenofibrate (Trilipix) 135 mg DR capsule   Condition well controlled.  No change in current treatment regimen.  Refill given of current medication.  Appropriate labs ordered or reviewed.  Make a follow up appointment with me for recheck in 6 months.       3. Generalized anxiety disorder  escitalopram (Lexapro) 10 mg tablet   Condition well controlled.  No change in current treatment regimen.  Refill given of current medication.  Make a follow up appointment with me for recheck in 6 months.       4. Primary hypertension  amLODIPine (Norvasc) 10 mg tablet, lisinopril 40 mg tablet   Condition well controlled.  No change in current treatment regimen.  Refill given of current medication.  Appropriate labs ordered or reviewed.  Make a follow up appointment with me for recheck in 6 months.

## 2023-12-27 ENCOUNTER — OFFICE VISIT (OUTPATIENT)
Dept: GASTROENTEROLOGY | Facility: CLINIC | Age: 48
End: 2023-12-27
Payer: COMMERCIAL

## 2023-12-27 ENCOUNTER — LAB (OUTPATIENT)
Dept: LAB | Facility: LAB | Age: 48
End: 2023-12-27
Payer: COMMERCIAL

## 2023-12-27 VITALS
BODY MASS INDEX: 36.16 KG/M2 | DIASTOLIC BLOOD PRESSURE: 70 MMHG | SYSTOLIC BLOOD PRESSURE: 119 MMHG | WEIGHT: 252 LBS | HEART RATE: 71 BPM

## 2023-12-27 DIAGNOSIS — K76.0 HEPATIC STEATOSIS: ICD-10-CM

## 2023-12-27 DIAGNOSIS — R18.8 OTHER ASCITES: ICD-10-CM

## 2023-12-27 DIAGNOSIS — R10.30 LOWER ABDOMINAL PAIN: ICD-10-CM

## 2023-12-27 DIAGNOSIS — R10.30 LOWER ABDOMINAL PAIN: Primary | ICD-10-CM

## 2023-12-27 DIAGNOSIS — K85.00 IDIOPATHIC ACUTE PANCREATITIS WITHOUT INFECTION OR NECROSIS (HHS-HCC): ICD-10-CM

## 2023-12-27 PROCEDURE — 3074F SYST BP LT 130 MM HG: CPT | Performed by: STUDENT IN AN ORGANIZED HEALTH CARE EDUCATION/TRAINING PROGRAM

## 2023-12-27 PROCEDURE — 1036F TOBACCO NON-USER: CPT | Performed by: STUDENT IN AN ORGANIZED HEALTH CARE EDUCATION/TRAINING PROGRAM

## 2023-12-27 PROCEDURE — 83013 H PYLORI (C-13) BREATH: CPT

## 2023-12-27 PROCEDURE — 99205 OFFICE O/P NEW HI 60 MIN: CPT | Performed by: STUDENT IN AN ORGANIZED HEALTH CARE EDUCATION/TRAINING PROGRAM

## 2023-12-27 PROCEDURE — 3078F DIAST BP <80 MM HG: CPT | Performed by: STUDENT IN AN ORGANIZED HEALTH CARE EDUCATION/TRAINING PROGRAM

## 2023-12-27 RX ORDER — OMEPRAZOLE 20 MG/1
20 TABLET, DELAYED RELEASE ORAL 2 TIMES DAILY
Qty: 60 TABLET | Refills: 11 | Status: SHIPPED | OUTPATIENT
Start: 2023-12-27 | End: 2024-05-14 | Stop reason: ALTCHOICE

## 2023-12-27 NOTE — PROGRESS NOTES
Subjective     History of Present Illness:   Brandon Snider is a 48 y.o. male with hx of CLL and obesity who presents for follow up from recent hospital admission in 11/2023 for idiopathic pancreatitis attributed to possible hydrochlorothiazide that was discontinued on discharge.     Patient states he was experiencing prodromal  symptoms as if he was coming down with a cold along with diarrhea, abdominal pain two weeks prior to presentation to the ED. He states he though he was experiencing issues with an abdominal hernia. He was diagnosed with acute pancreatitis based on elevated Lipase to 200s. Patient states he was experiencing pain in his lower abdomen that was cramping in nature. He had no radiation. Diarrhea resolved during admission. He had no nausea or emesis. Has not had symptoms similar to this in the past.     Review of CT scan suggests gastric and duodenal thickening as well as ?mild ascites. + hepatic steatosis. Repeat attempt at paracentesis was not successful due to insuffiencey fluid.    Review of labs showing elevated ALT to 74.     Patient states he does not smoke. He drinks 1-2 beers a month at most. No family hx of pancreatic malignancy or pancreatic disease.     Today, he states he feels at his baseline. No abdominal pain. He has 1-2 soft bowel movements daily. He has not had a prior colonoscopy. Completed cologuard two years ago that was negative per patient.         Past Medical History   has a past medical history of Encounter for general adult medical examination without abnormal findings (11/05/2019), Obesity, unspecified (11/05/2019), Other specified abnormal findings of blood chemistry (04/29/2021), Other specified counseling (12/14/2020), Personal history of diseases of the blood and blood-forming organs and certain disorders involving the immune mechanism (04/29/2021), and Sinobronchitis (01/27/2023).     Social History   reports that he has never smoked. He has never used smokeless  tobacco. He reports current alcohol use. He reports that he does not use drugs.     Family History  family history includes Esophageal cancer in his mother; Hypertension in his mother; Prostate cancer in his father, paternal grandfather, and another family member; cardiac disorder in his father; elevated liver enzymes in his father.     Allergies  Allergies   Allergen Reactions    Hydrochlorothiazide Other     May have caused pancreatitis 11/2023.       Medications  Current Outpatient Medications   Medication Instructions    amLODIPine (NORVASC) 10 mg, oral, Daily    choline fenofibrate (TRILIPIX) 135 mg, oral, Daily    escitalopram (LEXAPRO) 10 mg, oral, Daily    lisinopril 60 mg, oral, Daily    omeprazole OTC (PRILOSEC OTC) 20 mg, oral, 2 times daily, Do not crush, chew, or split.        Objective   Visit Vitals  /70   Pulse 71      Physical Exam  Vitals reviewed.   Constitutional:       Appearance: Normal appearance.   HENT:      Head: Normocephalic.      Mouth/Throat:      Mouth: Mucous membranes are moist.   Cardiovascular:      Rate and Rhythm: Normal rate and regular rhythm.   Pulmonary:      Effort: Pulmonary effort is normal.      Breath sounds: Normal breath sounds.   Abdominal:      General: Abdomen is flat.      Palpations: Abdomen is soft.      Tenderness: There is no abdominal tenderness. There is no guarding or rebound.      Hernia: No hernia is present.   Neurological:      General: No focal deficit present.      Mental Status: He is alert.   Psychiatric:         Mood and Affect: Mood normal.         Judgment: Judgment normal.         Assessment/Plan   Brandon Snider is a 48 y.o. male with hx of CLL and obesity who presents for follow up from recent hospital admission in 11/2023 for idiopathic pancreatitis attributed to possible hydrochlorothiazide that was discontinued on discharge.     Unclear if his symptoms were due to acute pancreatis vs ?gastroenteritis. Although lipase was elevated to  >3XULN, he did not have symptoms c/w pancreatic pain. CT read does not demonstrate any finding of pancreatitis (describing gastritis) though images not available for review at this time. His prodromal symptoms preceding admission suggest possible viral illness that would explain gastric thickening. Regardless, will plan on EGD for further evaluation to rule out underlying PUD, gastritis, malignancy (less likely). Will start on PPI daily for empiric treatment of PUD in the interim. Will test for Hpylori. Plan to obtain MRI with MRCP images to evaluate biliary tree and for any structural pathology that could have predisposed patient to pancreatitis. Reassuringly, he has clinically improved and is currently doing well.    Of note, review of labs show mild elevation in ALT to 74 with findings of steatosis on imaging. Will obtain fibroscan study for further evaluation. Discussed with patient recommendation for weight loss of at least 10% along with moderate exercise for 30 minutes at least 3x weekly to minimize risk of progression to advanced fibrosis/cirrhosis.         Problem List Items Addressed This Visit          Gastrointestinal and Abdominal    Idiopathic acute pancreatitis without infection or necrosis    Relevant Orders    MR abdomen w and wo IV contrast    Other ascites     Other Visit Diagnoses       Lower abdominal pain    -  Primary    Relevant Medications    omeprazole OTC (PriLOSEC OTC) 20 mg EC tablet    Other Relevant Orders    EGD    H. Pylori Breath Test    Hepatic steatosis        Relevant Orders    Liver Elastography (Fibroscan) TRINO Austin MD         My final recommendations will be communicated back to the requesting physician by way of shared Medical record or letter to requesting physician via fax.

## 2023-12-28 ENCOUNTER — TELEPHONE (OUTPATIENT)
Dept: GASTROENTEROLOGY | Facility: CLINIC | Age: 48
End: 2023-12-28
Payer: COMMERCIAL

## 2023-12-28 DIAGNOSIS — A04.8 H. PYLORI INFECTION: Primary | ICD-10-CM

## 2023-12-28 LAB — UREA BREATH TEST QL: POSITIVE

## 2023-12-28 RX ORDER — BISMUTH SUBCITRATE POTASSIUM, METRONIDAZOLE AND TETRACYCLINE HYDROCHLORIDE 140; 125; 125 MG/1; MG/1; MG/1
3 CAPSULE ORAL
Qty: 168 CAPSULE | Refills: 0 | Status: SHIPPED | OUTPATIENT
Start: 2023-12-28 | End: 2024-05-14 | Stop reason: ALTCHOICE

## 2023-12-28 NOTE — TELEPHONE ENCOUNTER
Called patient and discussed plan below. Will use Pylrera to simplify regimen.      Testing is showing Helicobacter positive gastritis. Plan to start regimen below  -     bismuth subsalicylate (Pepto Bismol) 262 mg chewable tablet; Chew 2 tablets (524 mg) 4 times a day before meals for 14 days.  -     metroNIDAZOLE (Flagyl) 500 mg tablet; Take 1 tablet (500 mg) by mouth 2 times a day for 14 days.  -     doxycycline (Monodox) 100 mg capsule; Take 1 capsule (100 mg) by mouth 2 times a day for 14 days. Take with at least 8 ounces (large glass) of water, do not lie down for 30 minutes after  -     omeprazole OTC (PriLOSEC OTC) 20 mg EC tablet; Take 1 tablet (20 mg) by mouth 2 times a day for 14 days. Do not crush, chew, or split.    Fourteen days after completion of above medications, will plan to test for eradication with breath test. Please ensure you have been OFF PPI therapy for at least 14 days prior to testing as PPI therapy may lead to false negative results      Pt agreeable with plan and all questions answered.         Bianca Austin MD

## 2023-12-29 ENCOUNTER — ANESTHESIA EVENT (OUTPATIENT)
Dept: GASTROENTEROLOGY | Facility: EXTERNAL LOCATION | Age: 48
End: 2023-12-29

## 2024-01-02 ENCOUNTER — HOSPITAL ENCOUNTER (OUTPATIENT)
Dept: GASTROENTEROLOGY | Facility: EXTERNAL LOCATION | Age: 49
Discharge: HOME | End: 2024-01-02
Payer: COMMERCIAL

## 2024-01-02 ENCOUNTER — ANESTHESIA (OUTPATIENT)
Dept: GASTROENTEROLOGY | Facility: EXTERNAL LOCATION | Age: 49
End: 2024-01-02

## 2024-01-02 VITALS
OXYGEN SATURATION: 96 % | RESPIRATION RATE: 19 BRPM | BODY MASS INDEX: 36.08 KG/M2 | HEIGHT: 70 IN | WEIGHT: 252 LBS | HEART RATE: 70 BPM | DIASTOLIC BLOOD PRESSURE: 78 MMHG | SYSTOLIC BLOOD PRESSURE: 126 MMHG | TEMPERATURE: 98.1 F

## 2024-01-02 DIAGNOSIS — R10.30 LOWER ABDOMINAL PAIN: ICD-10-CM

## 2024-01-02 PROCEDURE — 0753T DGTZ GLS MCRSCP SLD LEVEL IV: CPT

## 2024-01-02 PROCEDURE — 88305 TISSUE EXAM BY PATHOLOGIST: CPT

## 2024-01-02 PROCEDURE — 0760T DGTZ GLS MCRSCP SL IMM 1ST: CPT

## 2024-01-02 PROCEDURE — 88342 IMHCHEM/IMCYTCHM 1ST ANTB: CPT | Performed by: PATHOLOGY

## 2024-01-02 PROCEDURE — 88342 IMHCHEM/IMCYTCHM 1ST ANTB: CPT

## 2024-01-02 PROCEDURE — 88305 TISSUE EXAM BY PATHOLOGIST: CPT | Performed by: PATHOLOGY

## 2024-01-02 PROCEDURE — 43239 EGD BIOPSY SINGLE/MULTIPLE: CPT | Performed by: STUDENT IN AN ORGANIZED HEALTH CARE EDUCATION/TRAINING PROGRAM

## 2024-01-02 RX ORDER — ONDANSETRON HYDROCHLORIDE 2 MG/ML
4 INJECTION, SOLUTION INTRAVENOUS ONCE AS NEEDED
Status: DISCONTINUED | OUTPATIENT
Start: 2024-01-02 | End: 2024-01-03 | Stop reason: HOSPADM

## 2024-01-02 RX ORDER — SODIUM CHLORIDE 9 MG/ML
20 INJECTION, SOLUTION INTRAVENOUS CONTINUOUS
Status: DISCONTINUED | OUTPATIENT
Start: 2024-01-02 | End: 2024-01-03 | Stop reason: HOSPADM

## 2024-01-02 RX ORDER — LIDOCAINE HYDROCHLORIDE 20 MG/ML
INJECTION, SOLUTION INFILTRATION; PERINEURAL AS NEEDED
Status: DISCONTINUED | OUTPATIENT
Start: 2024-01-02 | End: 2024-01-02

## 2024-01-02 RX ORDER — PROPOFOL 10 MG/ML
INJECTION, EMULSION INTRAVENOUS AS NEEDED
Status: DISCONTINUED | OUTPATIENT
Start: 2024-01-02 | End: 2024-01-02

## 2024-01-02 RX ADMIN — PROPOFOL 80 MG: 10 INJECTION, EMULSION INTRAVENOUS at 09:57

## 2024-01-02 RX ADMIN — PROPOFOL 80 MG: 10 INJECTION, EMULSION INTRAVENOUS at 09:55

## 2024-01-02 RX ADMIN — LIDOCAINE HYDROCHLORIDE 4 ML: 20 INJECTION, SOLUTION INFILTRATION; PERINEURAL at 09:52

## 2024-01-02 RX ADMIN — PROPOFOL 100 MG: 10 INJECTION, EMULSION INTRAVENOUS at 09:52

## 2024-01-02 RX ADMIN — SODIUM CHLORIDE: 9 INJECTION, SOLUTION INTRAVENOUS at 09:52

## 2024-01-02 SDOH — HEALTH STABILITY: MENTAL HEALTH: CURRENT SMOKER: 0

## 2024-01-02 ASSESSMENT — COLUMBIA-SUICIDE SEVERITY RATING SCALE - C-SSRS
2. HAVE YOU ACTUALLY HAD ANY THOUGHTS OF KILLING YOURSELF?: NO
6. HAVE YOU EVER DONE ANYTHING, STARTED TO DO ANYTHING, OR PREPARED TO DO ANYTHING TO END YOUR LIFE?: NO
1. IN THE PAST MONTH, HAVE YOU WISHED YOU WERE DEAD OR WISHED YOU COULD GO TO SLEEP AND NOT WAKE UP?: NO

## 2024-01-02 ASSESSMENT — PAIN - FUNCTIONAL ASSESSMENT
PAIN_FUNCTIONAL_ASSESSMENT: 0-10

## 2024-01-02 ASSESSMENT — PAIN SCALES - GENERAL
PAINLEVEL_OUTOF10: 0 - NO PAIN
PAIN_LEVEL: 0
PAINLEVEL_OUTOF10: 0 - NO PAIN
PAINLEVEL_OUTOF10: 0 - NO PAIN

## 2024-01-02 NOTE — ANESTHESIA POSTPROCEDURE EVALUATION
Patient: Brandon Snider    Procedure Summary       Date: 01/02/24 Room / Location: [facility]    Anesthesia Start: 0949 Anesthesia Stop: 1003    Procedure: EGD Diagnosis: Lower abdominal pain    Scheduled Providers: Bianca Austin MD Responsible Provider: CRISTINE Patino    Anesthesia Type: MAC ASA Status: 2            Anesthesia Type: MAC    Vitals Value Taken Time   /77 01/02/24 1001   Temp 36.7 °C (98.1 °F) 01/02/24 1001   Pulse 77 01/02/24 1001   Resp 16 01/02/24 1001   SpO2 95 01/02/24 1003       Anesthesia Post Evaluation    Patient location during evaluation: bedside  Patient participation: complete - patient participated  Level of consciousness: awake and sleepy but conscious  Pain score: 0  Pain management: adequate  Airway patency: patent  Cardiovascular status: acceptable  Respiratory status: acceptable  Hydration status: acceptable  Postoperative Nausea and Vomiting: none        There were no known notable events for this encounter.

## 2024-01-02 NOTE — DISCHARGE INSTRUCTIONS
The anesthetics, sedatives and pain killers which were given to you will be acting in your body for the next 24 hours. This may cause you to feel sleepy. This feeling will slowly wear off. For the next 24 hours you SHOULD NOT:    Drive a car  Operate machinery or power tools.  Drink any form of alcohol, including beer or wine.  Make any important decisions or sign and legal documents.    You may eat anything as long as your physician has not warned you to stay away from certain foods. However, it is better to start with liquids,  then progress to softer foods, and gradually work up to solid foods.    We strongly suggest that a responsible adult be with you for the rest of the day and also the night. This is for your protection and safety since you may not be as alert as usual. You should be especially careful climbing stairs.     If you experience bleeding, fever, shortness of breath, chest pain, or extreme abdominal pain go to the nearest Emergency Room.    [facility] Phone Number [phone]

## 2024-01-02 NOTE — ANESTHESIA PREPROCEDURE EVALUATION
Patient: Brandon Snider    Procedure Information       Date/Time: 01/02/24 0940    Scheduled providers: Bianca Austin MD    Procedure: EGD    Location: [facility]            Relevant Problems   Cardiovascular   (+) Hypercholesterolemia   (+) Primary hypertension      Endocrine   (+) Class 2 severe obesity due to excess calories with serious comorbidity and body mass index (BMI) of 38.0 to 38.9 in adult (CMS/HCC)      GI   (+) Idiopathic acute pancreatitis without infection or necrosis      /Renal   (+) Fatty liver      Neuro/Psych   (+) Generalized anxiety disorder      GI/Hepatic   (+) Elevated LFTs   (+) Fatty liver      Hematology   (+) CLL (chronic lymphocytic leukemia) (CMS/HCC)      Infectious Disease   (+) Flu      Other   (+) CLL (chronic lymphocytic leukemia) (CMS/HCC)       Clinical information reviewed:   Tobacco  Allergies  Meds   Med Hx  Surg Hx   Fam Hx  Soc Hx        NPO Detail:  NPO/Void Status  Carbonhydrate Drink Given Prior to Surgery? : N  Date of Last Liquid: 01/01/24  Time of Last Liquid: 2230  Date of Last Solid: 01/01/24  Time of Last Solid: 1800  Last Intake Type: Clear fluids  Time of Last Void: 0918         Physical Exam    Airway  Mallampati: II  TM distance: >3 FB  Neck ROM: full     Cardiovascular - normal exam  Rhythm: regular  Rate: normal     Dental - normal exam       Pulmonary - normal exam  Breath sounds clear to auscultation     Abdominal            Anesthesia Plan    ASA 2     MAC     The patient is not a current smoker.    intravenous induction   Anesthetic plan and risks discussed with patient.    Plan discussed with CRNA.

## 2024-01-03 ENCOUNTER — PATIENT OUTREACH (OUTPATIENT)
Dept: PRIMARY CARE | Facility: CLINIC | Age: 49
End: 2024-01-03
Payer: COMMERCIAL

## 2024-01-04 ENCOUNTER — APPOINTMENT (OUTPATIENT)
Dept: RESPIRATORY THERAPY | Facility: HOSPITAL | Age: 49
End: 2024-01-04
Payer: COMMERCIAL

## 2024-01-09 LAB
LAB AP ASR DISCLAIMER: NORMAL
LABORATORY COMMENT REPORT: NORMAL
PATH REPORT.COMMENTS IMP SPEC: NORMAL
PATH REPORT.FINAL DX SPEC: NORMAL
PATH REPORT.GROSS SPEC: NORMAL
PATH REPORT.TOTAL CANCER: NORMAL

## 2024-01-10 ENCOUNTER — OFFICE VISIT (OUTPATIENT)
Dept: HEMATOLOGY/ONCOLOGY | Facility: CLINIC | Age: 49
End: 2024-01-10
Payer: COMMERCIAL

## 2024-01-10 VITALS
DIASTOLIC BLOOD PRESSURE: 83 MMHG | RESPIRATION RATE: 16 BRPM | BODY MASS INDEX: 35.9 KG/M2 | TEMPERATURE: 97.2 F | HEART RATE: 79 BPM | WEIGHT: 250.22 LBS | OXYGEN SATURATION: 92 % | SYSTOLIC BLOOD PRESSURE: 139 MMHG

## 2024-01-10 DIAGNOSIS — E78.00 HYPERCHOLESTEROLEMIA: ICD-10-CM

## 2024-01-10 DIAGNOSIS — I10 PRIMARY HYPERTENSION: ICD-10-CM

## 2024-01-10 DIAGNOSIS — C91.10 CLL (CHRONIC LYMPHOCYTIC LEUKEMIA) (MULTI): Primary | ICD-10-CM

## 2024-01-10 PROCEDURE — 99214 OFFICE O/P EST MOD 30 MIN: CPT | Performed by: INTERNAL MEDICINE

## 2024-01-10 PROCEDURE — 1036F TOBACCO NON-USER: CPT | Performed by: INTERNAL MEDICINE

## 2024-01-10 PROCEDURE — 3079F DIAST BP 80-89 MM HG: CPT | Performed by: INTERNAL MEDICINE

## 2024-01-10 PROCEDURE — 3075F SYST BP GE 130 - 139MM HG: CPT | Performed by: INTERNAL MEDICINE

## 2024-01-10 ASSESSMENT — PAIN SCALES - GENERAL: PAINLEVEL: 0-NO PAIN

## 2024-01-10 NOTE — PROGRESS NOTES
Patient ID: Brandon Snider is a 48 y.o. male.  Referring Physician: No referring provider defined for this encounter.  Primary Care Provider: Bernard Hernandez MD  Visit Type: Follow Up      Subjective    HPI  How are my blood counts?    Review of Systems   Constitutional: Negative.    HENT:  Negative.     Eyes: Negative.    Respiratory: Negative.     Cardiovascular: Negative.    Gastrointestinal: Negative.    Endocrine: Negative.    Genitourinary: Negative.     Musculoskeletal: Negative.    Skin: Negative.    Neurological: Negative.    Hematological: Negative.    Psychiatric/Behavioral: Negative.          Objective   BSA: 2.36 meters squared  /83 (BP Location: Right arm)   Pulse 79   Temp 36.2 °C (97.2 °F) (Temporal)   Resp 16   Wt 113 kg (250 lb 3.6 oz)   SpO2 92%   BMI 35.90 kg/m²      has a past medical history of Encounter for general adult medical examination without abnormal findings (11/05/2019), Hypertension, Obesity, unspecified (11/05/2019), Other specified abnormal findings of blood chemistry (04/29/2021), Other specified counseling (12/14/2020), Personal history of diseases of the blood and blood-forming organs and certain disorders involving the immune mechanism (04/29/2021), and Sinobronchitis (01/27/2023).   has no past surgical history on file.  Family History   Problem Relation Name Age of Onset    Esophageal cancer Mother      Hypertension Mother      Other (elevated liver enzymes) Father      Other (cardiac disorder) Father      Prostate cancer Father      Prostate cancer Paternal Grandfather      Prostate cancer Other grandfather      Oncology History    No history exists.       Brandon Snider  reports that he has never smoked. He has never used smokeless tobacco.  He  reports current alcohol use.  He  reports no history of drug use.    Physical Exam  Vitals reviewed.   HENT:      Head: Normocephalic.      Mouth/Throat:      Mouth: Mucous membranes are moist.   Eyes:       Extraocular Movements: Extraocular movements intact.      Pupils: Pupils are equal, round, and reactive to light.   Cardiovascular:      Rate and Rhythm: Normal rate and regular rhythm.      Heart sounds: Normal heart sounds.   Pulmonary:      Breath sounds: Normal breath sounds.   Abdominal:      General: Bowel sounds are normal.      Palpations: Abdomen is soft.   Musculoskeletal:         General: Normal range of motion.      Cervical back: Normal range of motion and neck supple.   Skin:     General: Skin is warm and dry.   Neurological:      General: No focal deficit present.      Mental Status: He is alert and oriented to person, place, and time.   Psychiatric:         Mood and Affect: Mood normal.         Behavior: Behavior normal.         WBC   Date/Time Value Ref Range Status   11/28/2023 02:23 PM 33.6 (H) 4.4 - 11.3 x10*3/uL Final   07/12/2023 10:24 AM 32.5 (H) 4.4 - 11.3 x10E9/L Final   12/21/2022 01:03 PM 30.0 (H) 4.4 - 11.3 x10E9/L Final   06/06/2022 08:56 AM 25.8 (H) 4.4 - 11.3 x10E9/L Final     nRBC   Date Value Ref Range Status   11/28/2023 0.0 0.0 - 0.0 /100 WBCs Final   12/14/2020 0.0 0.0 - 0.0 /100 WBC Final   11/07/2019 0.0 0.0 - 0.0 /100 WBC Final   10/31/2018 0.0 0.0 - 0.0 /100 WBC Final     RBC   Date Value Ref Range Status   11/28/2023 4.16 (L) 4.50 - 5.90 x10*6/uL Final   07/12/2023 4.46 (L) 4.50 - 5.90 x10E12/L Final   12/21/2022 4.50 4.50 - 5.90 x10E12/L Final   06/06/2022 4.44 (L) 4.50 - 5.90 x10E12/L Final     Hemoglobin   Date Value Ref Range Status   11/28/2023 11.7 (L) 13.5 - 17.5 g/dL Final   07/12/2023 12.9 (L) 13.5 - 17.5 g/dL Final   12/21/2022 13.2 (L) 13.5 - 17.5 g/dL Final   06/06/2022 13.0 (L) 13.5 - 17.5 g/dL Final     Hematocrit   Date Value Ref Range Status   11/28/2023 36.8 (L) 41.0 - 52.0 % Final   07/12/2023 38.7 (L) 41.0 - 52.0 % Final   12/21/2022 40.0 (L) 41.0 - 52.0 % Final   06/06/2022 39.2 (L) 41.0 - 52.0 % Final     MCV   Date/Time Value Ref Range Status  "  11/28/2023 02:23 PM 89 80 - 100 fL Final   07/12/2023 10:24 AM 87 80 - 100 fL Final   12/21/2022 01:03 PM 89 80 - 100 fL Final   06/06/2022 08:56 AM 88 80 - 100 fL Final     MCH   Date/Time Value Ref Range Status   11/28/2023 02:23 PM 28.1 26.0 - 34.0 pg Final     MCHC   Date/Time Value Ref Range Status   11/28/2023 02:23 PM 31.8 (L) 32.0 - 36.0 g/dL Final   07/12/2023 10:24 AM 33.3 32.0 - 36.0 g/dL Final   12/21/2022 01:03 PM 33.0 32.0 - 36.0 g/dL Final   06/06/2022 08:56 AM 33.2 32.0 - 36.0 g/dL Final     RDW   Date/Time Value Ref Range Status   11/28/2023 02:23 PM 13.2 11.5 - 14.5 % Final   07/12/2023 10:24 AM 13.0 11.5 - 14.5 % Final   12/21/2022 01:03 PM 13.0 11.5 - 14.5 % Final   06/06/2022 08:56 AM 13.1 11.5 - 14.5 % Final     Platelets   Date/Time Value Ref Range Status   01/10/2024 09:31  150 - 450 x10*3/uL Preliminary   11/28/2023 02:23  150 - 450 x10*3/uL Final   07/12/2023 10:24  150 - 450 x10E9/L Final   12/21/2022 01:03  150 - 450 x10E9/L Final   06/06/2022 08:56  150 - 450 x10E9/L Final     No results found for: \"MPV\"  Neutrophils %   Date/Time Value Ref Range Status   11/28/2023 02:23 PM 18.2 40.0 - 80.0 % Final   07/12/2023 10:24 AM 16.2 40.0 - 80.0 % Final   06/06/2022 08:56 AM 18.8 40.0 - 80.0 % Final   12/13/2021 09:10 AM 21.2 40.0 - 80.0 % Final     Immature Granulocytes %, Automated   Date/Time Value Ref Range Status   11/28/2023 02:23 PM 0.7 0.0 - 0.9 % Final     Comment:     Immature Granulocyte Count (IG) includes promyelocytes, myelocytes and metamyelocytes but does not include bands. Percent differential counts (%) should be interpreted in the context of the absolute cell counts (cells/UL).   07/12/2023 10:24 AM 0.5 0.0 - 0.9 % Final     Comment:      Immature Granulocyte Count (IG) includes promyelocytes,    myelocytes and metamyelocytes but does not include bands.   Percent differential counts (%) should be interpreted in the   context of the absolute cell " counts (cells/L).     12/21/2022 01:03 PM 0.4 0.0 - 0.9 % Final     Comment:      Immature Granulocyte Count (IG) includes promyelocytes,    myelocytes and metamyelocytes but does not include bands.   Percent differential counts (%) should be interpreted in the   context of the absolute cell counts (cells/L).     06/06/2022 08:56 AM 0.2 0.0 - 0.9 % Final     Comment:      Immature Granulocyte Count (IG) includes promyelocytes,    myelocytes and metamyelocytes but does not include bands.   Percent differential counts (%) should be interpreted in the   context of the absolute cell counts (cells/L).       Lymphocytes %   Date/Time Value Ref Range Status   11/28/2023 02:23 PM 71.1 13.0 - 44.0 % Final   07/12/2023 10:24 AM 75.4 13.0 - 44.0 % Final   12/21/2022 01:03 PM 77.0 13.0 - 44.0 % Final   06/06/2022 08:56 AM 70.5 13.0 - 44.0 % Final   12/13/2021 09:10 AM 68.8 13.0 - 44.0 % Final     Monocytes %   Date/Time Value Ref Range Status   11/28/2023 02:23 PM 7.4 2.0 - 10.0 % Final   07/12/2023 10:24 AM 6.7 2.0 - 10.0 % Final   12/21/2022 01:03 PM 2.0 2.0 - 10.0 % Final   06/06/2022 08:56 AM 7.2 2.0 - 10.0 % Final   12/13/2021 09:10 AM 7.8 2.0 - 10.0 % Final     Eosinophils %   Date/Time Value Ref Range Status   11/28/2023 02:23 PM 2.2 0.0 - 6.0 % Final   07/12/2023 10:24 AM 1.0 0.0 - 6.0 % Final   12/21/2022 01:03 PM 1.0 0.0 - 6.0 % Final   06/06/2022 08:56 AM 3.0 0.0 - 6.0 % Final   12/13/2021 09:10 AM 1.2 0.0 - 6.0 % Final     Basophils %   Date/Time Value Ref Range Status   11/28/2023 02:23 PM 0.4 0.0 - 2.0 % Final   07/12/2023 10:24 AM 0.2 0.0 - 2.0 % Final   12/21/2022 01:03 PM 1.0 0.0 - 2.0 % Final   06/06/2022 08:56 AM 0.3 0.0 - 2.0 % Final   12/13/2021 09:10 AM 0.4 0.0 - 2.0 % Final     Neutrophils Absolute   Date/Time Value Ref Range Status   11/28/2023 02:23 PM 6.13 1.20 - 7.70 x10*3/uL Final     Comment:     Percent differential counts (%) should be interpreted in the context of the absolute cell counts  "(cells/uL).   07/12/2023 10:24 AM 5.27 1.20 - 7.70 x10E9/L Final   06/06/2022 08:56 AM 4.85 1.20 - 7.70 x10E9/L Final   12/13/2021 09:10 AM 5.21 1.20 - 7.70 x10E9/L Final     Immature Granulocytes Absolute, Automated   Date/Time Value Ref Range Status   11/28/2023 02:23 PM 0.23 0.00 - 0.70 x10*3/uL Final     Lymphocytes Absolute   Date/Time Value Ref Range Status   11/28/2023 02:23 PM 23.89 (H) 1.20 - 4.80 x10*3/uL Final   07/12/2023 10:24 AM 24.47 (H) 1.20 - 4.80 x10E9/L Final   06/06/2022 08:56 AM 18.19 (H) 1.20 - 4.80 x10E9/L Final   12/13/2021 09:10 AM 16.86 (H) 1.20 - 4.80 x10E9/L Final     Monocytes Absolute   Date/Time Value Ref Range Status   11/28/2023 02:23 PM 2.48 (H) 0.10 - 1.00 x10*3/uL Final   07/12/2023 10:24 AM 2.18 (H) 0.10 - 1.00 x10E9/L Final   06/06/2022 08:56 AM 1.86 (H) 0.10 - 1.00 x10E9/L Final   12/13/2021 09:10 AM 1.91 (H) 0.10 - 1.00 x10E9/L Final     Eosinophils Absolute   Date/Time Value Ref Range Status   11/28/2023 02:23 PM 0.73 (H) 0.00 - 0.70 x10*3/uL Final   07/12/2023 10:24 AM 0.32 0.00 - 0.70 x10E9/L Final   12/21/2022 01:03 PM 0.30 0.00 - 0.70 x10E9/L Final   06/06/2022 08:56 AM 0.77 (H) 0.00 - 0.70 x10E9/L Final   12/13/2021 09:10 AM 0.30 0.00 - 0.70 x10E9/L Final     Basophils Absolute   Date/Time Value Ref Range Status   11/28/2023 02:23 PM 0.14 (H) 0.00 - 0.10 x10*3/uL Final   07/12/2023 10:24 AM 0.07 0.00 - 0.10 x10E9/L Final     Comment:     Automated WBC differential has been confirmed by manual smear.   12/21/2022 01:03 PM 0.30 (H) 0.00 - 0.10 x10E9/L Final   06/06/2022 08:56 AM 0.07 0.00 - 0.10 x10E9/L Final     Comment:     s   12/13/2021 09:10 AM 0.09 0.00 - 0.10 x10E9/L Final     Comment:     Automated WBC differential has been confirmed by manual smear.       No components found for: \"PT\"  No results found for: \"APTT\"  Medication Documentation Review Audit       Reviewed by Uyen Hull MA (Medical Assistant) on 01/10/24 at 0945      Medication Order Taking? Sig " "Documenting Provider Last Dose Status   amLODIPine (Norvasc) 10 mg tablet [id number] Yes Take 1 tablet (10 mg) by mouth once daily. Bernard Hernandez MD Taking Active   bismuth subcit G-vmnikohwj-nzu (Pylera) 140-125-125 mg capsule [id number] No Take 3 capsules by mouth 4 times a day before meals for 14 days. Follow each dose with 8 oz of water.   Patient not taking: Reported on 1/10/2024    Bianca Austin MD Not Taking Active   choline fenofibrate (Trilipix) 135 mg DR capsule [id number] Yes Take 1 capsule (135 mg) by mouth once daily. Bernard Hernandez MD Taking Active   escitalopram (Lexapro) 10 mg tablet [id number] Yes Take 1 tablet (10 mg) by mouth once daily. Bernard Hernandez MD Taking Active   lisinopril 40 mg tablet [id number] Yes Take 1.5 tablets (60 mg) by mouth once daily. Bernard Hernandez MD Taking Active   omeprazole OTC (PriLOSEC OTC) 20 mg EC tablet [id number] No Take 1 tablet (20 mg) by mouth 2 times a day. Do not crush, chew, or split.   Patient not taking: Reported on 1/10/2024    Bianca Austin MD Not Taking Active                   Assessment/Plan    1) CLL/SLL  - here for interval followup  -today's labs included CBC, COMP  -results reviewed--wbc 38.3, hgb 13.1, plt 343,000, ANC 6130, abs lymph 29.110, creatinine 0.69, AST 25, ALT 37  -he reports occasional \"neck sweats\" at night--but no where else on his body  -in late November he was hospitalized at [facility] for GI issues--generalized abdominal pain, which they thought was due to pancreatitis  -he had an abdominal CT scan showed mld abdominopelvic ascites, no splenomegaly, peripancreatic/periportal lymph nodes up to 1.5 cm, and 1.9 cm portal caval node, external iliac lymph nodes bilaterally up to 2.1 cm  -he was diagnosed with H pylori and put on triple therapy (Pylera)  -[facility] set up an outpatient lymph node biopsy, however, he and his wife said that [facility] staff \"paid no attention\" to the fact that he has CLL, and that many CLL patients will have " SLL (small lymphocytic lymphoma); if the biopsy had been done, it would have nearly for sure showed SLL, which would not alter management anyway  -he just had an EGD done on 1/2/2024-showing normal esophagus, edematous and erythematous mucosa in the antrum; path showed gastric oxyntic and antral mucosa with chronic gastritis; IHC was negative for H pylori  -he otherwise has not developed any bulky lymphadenopathy nor any true constitutional symptoms secondary to CLL  -will continue to see him Q6 months     2. HTN  -on lisinopril  -on amlodipine     3.  HLD  -on trilipix     Problem List Items Addressed This Visit             ICD-10-CM    CLL (chronic lymphocytic leukemia) (CMS/HCC) - Primary C91.10    Relevant Orders    CBC and Auto Differential (Completed)    Comprehensive Metabolic Panel (Completed)    Clinic Appointment Request Follow Up; GAGE ZAVALA; [facility]    CBC and Auto Differential    Comprehensive metabolic panel    Iron and TIBC    Ferritin            Gage Zavala MD

## 2024-01-13 ASSESSMENT — ENCOUNTER SYMPTOMS
CARDIOVASCULAR NEGATIVE: 1
HEMATOLOGIC/LYMPHATIC NEGATIVE: 1
NEUROLOGICAL NEGATIVE: 1
CONSTITUTIONAL NEGATIVE: 1
GASTROINTESTINAL NEGATIVE: 1
ENDOCRINE NEGATIVE: 1
RESPIRATORY NEGATIVE: 1
PSYCHIATRIC NEGATIVE: 1
MUSCULOSKELETAL NEGATIVE: 1
EYES NEGATIVE: 1

## 2024-01-17 ENCOUNTER — CLINICAL SUPPORT (OUTPATIENT)
Dept: GASTROENTEROLOGY | Facility: CLINIC | Age: 49
End: 2024-01-17
Payer: COMMERCIAL

## 2024-01-17 DIAGNOSIS — K76.0 HEPATIC STEATOSIS: ICD-10-CM

## 2024-01-17 PROCEDURE — 91200 LIVER ELASTOGRAPHY: CPT | Performed by: INTERNAL MEDICINE

## 2024-01-18 ENCOUNTER — APPOINTMENT (OUTPATIENT)
Dept: RADIOLOGY | Facility: CLINIC | Age: 49
End: 2024-01-18
Payer: COMMERCIAL

## 2024-01-25 ENCOUNTER — LAB (OUTPATIENT)
Dept: LAB | Facility: LAB | Age: 49
End: 2024-01-25
Payer: COMMERCIAL

## 2024-01-25 DIAGNOSIS — A04.8 H. PYLORI INFECTION: ICD-10-CM

## 2024-01-25 PROCEDURE — 83013 H PYLORI (C-13) BREATH: CPT

## 2024-01-26 LAB — UREA BREATH TEST QL: NEGATIVE

## 2024-01-29 ENCOUNTER — OFFICE VISIT (OUTPATIENT)
Dept: GASTROENTEROLOGY | Facility: CLINIC | Age: 49
End: 2024-01-29
Payer: COMMERCIAL

## 2024-01-29 VITALS
HEIGHT: 70 IN | DIASTOLIC BLOOD PRESSURE: 76 MMHG | OXYGEN SATURATION: 95 % | RESPIRATION RATE: 18 BRPM | BODY MASS INDEX: 36.31 KG/M2 | SYSTOLIC BLOOD PRESSURE: 166 MMHG | WEIGHT: 253.6 LBS | HEART RATE: 78 BPM | TEMPERATURE: 97.6 F

## 2024-01-29 DIAGNOSIS — R10.84 GENERALIZED ABDOMINAL PAIN: ICD-10-CM

## 2024-01-29 DIAGNOSIS — K29.00 OTHER ACUTE GASTRITIS WITHOUT HEMORRHAGE: ICD-10-CM

## 2024-01-29 DIAGNOSIS — R79.89 ELEVATED LFTS: ICD-10-CM

## 2024-01-29 DIAGNOSIS — K76.0 FATTY LIVER: Primary | ICD-10-CM

## 2024-01-29 DIAGNOSIS — C91.10 CLL (CHRONIC LYMPHOCYTIC LEUKEMIA) (MULTI): ICD-10-CM

## 2024-01-29 DIAGNOSIS — K85.00 IDIOPATHIC ACUTE PANCREATITIS WITHOUT INFECTION OR NECROSIS (HHS-HCC): ICD-10-CM

## 2024-01-29 PROCEDURE — 99215 OFFICE O/P EST HI 40 MIN: CPT | Performed by: STUDENT IN AN ORGANIZED HEALTH CARE EDUCATION/TRAINING PROGRAM

## 2024-01-29 PROCEDURE — 3077F SYST BP >= 140 MM HG: CPT | Performed by: STUDENT IN AN ORGANIZED HEALTH CARE EDUCATION/TRAINING PROGRAM

## 2024-01-29 PROCEDURE — 1036F TOBACCO NON-USER: CPT | Performed by: STUDENT IN AN ORGANIZED HEALTH CARE EDUCATION/TRAINING PROGRAM

## 2024-01-29 PROCEDURE — 3078F DIAST BP <80 MM HG: CPT | Performed by: STUDENT IN AN ORGANIZED HEALTH CARE EDUCATION/TRAINING PROGRAM

## 2024-01-29 NOTE — PATIENT INSTRUCTIONS
Follow up in 6 months  MRI scheduled to evaluate pancreas  recommendation for weight loss of at least 10% along with moderate exercise for 30 minutes at least 3x weekly to minimize risk of progression to advanced fibrosis

## 2024-01-29 NOTE — PROGRESS NOTES
Subjective     History of Present Illness:   Brandon Snider is a 48 y.o. male with hx of CLL and obesity who presents for follow up from recent hospital admission in 11/2023 for idiopathic pancreatitis attributed to possible hydrochlorothiazide that was discontinued on discharge. Since our last visit he completed treatment for Hpylori and documented negative re-testing. Fibroscan showing F2 fibrosis. His MRI is scheduled for 02/01.      With regards to his hx,  Patient states he was experiencing prodromal  symptoms as if he was coming down with a cold along with diarrhea, abdominal pain two weeks prior to presentation to the ED. He states he though he was experiencing issues with an abdominal hernia. He was diagnosed with acute pancreatitis based on elevated Lipase to 200s. Patient states he was experiencing pain in his lower abdomen that was cramping in nature. He had no radiation. Diarrhea resolved during admission. He had no nausea or emesis. Has not had symptoms similar to this in the past.     Review of CT scan suggests gastric and duodenal thickening as well as ?mild ascites. + hepatic steatosis. Repeat attempt at paracentesis was not successful due to insuffiencey fluid.    Review of labs showing elevated ALT to 74.     Patient states he does not smoke. He drinks 1-2 beers a month at most. No family hx of pancreatic malignancy or pancreatic disease.     Today, he states he feels at his baseline. No abdominal pain. He has 1-2 soft bowel movements daily. He has not had a prior colonoscopy. Completed cologuard two years ago that was negative per patient.         Past Medical History   has a past medical history of Encounter for general adult medical examination without abnormal findings (11/05/2019), Hypertension, Obesity, unspecified (11/05/2019), Other specified abnormal findings of blood chemistry (04/29/2021), Other specified counseling (12/14/2020), Personal history of diseases of the blood and  blood-forming organs and certain disorders involving the immune mechanism (04/29/2021), and Sinobronchitis (01/27/2023).     Social History   reports that he has never smoked. He has never used smokeless tobacco. He reports current alcohol use. He reports that he does not use drugs.     Family History  family history includes Esophageal cancer in his mother; Hypertension in his mother; Prostate cancer in his father, paternal grandfather, and another family member; cardiac disorder in his father; elevated liver enzymes in his father.     Allergies  Allergies   Allergen Reactions    Hydrochlorothiazide Other     May have caused pancreatitis 11/2023.       Medications  Current Outpatient Medications   Medication Instructions    amLODIPine (NORVASC) 10 mg, oral, Daily    bismuth subcit [id number] (Pylera) 140-125-125 mg capsule 3 capsules, oral, 4 times daily before meals and nightly, Follow each dose with 8 oz of water.    choline fenofibrate (TRILIPIX) 135 mg, oral, Daily    escitalopram (LEXAPRO) 10 mg, oral, Daily    lisinopril 60 mg, oral, Daily    omeprazole OTC (PRILOSEC OTC) 20 mg, oral, 2 times daily, Do not crush, chew, or split.        Objective   Visit Vitals  /76 (BP Location: Left arm, Patient Position: Sitting, BP Cuff Size: Large adult)   Pulse 78   Temp 36.4 °C (97.6 °F) (Temporal)   Resp 18      Physical Exam  Vitals reviewed.   Constitutional:       Appearance: Normal appearance.   HENT:      Head: Normocephalic.      Mouth/Throat:      Mouth: Mucous membranes are moist.   Cardiovascular:      Rate and Rhythm: Normal rate and regular rhythm.   Pulmonary:      Effort: Pulmonary effort is normal.      Breath sounds: Normal breath sounds.   Abdominal:      General: There is no distension.   Neurological:      General: No focal deficit present.      Mental Status: He is alert.   Psychiatric:         Mood and Affect: Mood normal.         Judgment: Judgment normal.         Assessment/Plan    Brandon Snider is a 48 y.o. male with hx of CLL and obesity who presents for follow up from recent hospital admission in 11/2023 for idiopathic pancreatitis attributed to possible hydrochlorothiazide that was discontinued on discharge.     Unclear if his symptoms were due to acute pancreatis vs ?gastroenteritis. Although lipase was elevated to >3XULN, he did not have symptoms c/w pancreatic pain. CT read does not demonstrate any finding of pancreatitis (describing gastritis) though images not available for review at this time. His prodromal symptoms preceding admission suggest possible viral illness that would explain gastric thickening as well as Hpylori which has since been treated with confirmed eradication.       Plan to obtain MRI with MRCP images to evaluate biliary tree and for any structural pathology that could have predisposed patient to pancreatitis. Reassuringly, he has clinically improved and is currently doing well.    Of note, review of labs show mild elevation in ALT to 74 with findings of steatosis on imaging. Fibroscan with F2 fibrosis. Discussed with patient recommendation for weight loss of at least 10% along with moderate exercise for 30 minutes at least 3x weekly to minimize risk of progression to advanced fibrosis/cirrhosis. His repeat liver enzymes two weeks ago were WNL. He had prior viral hepatitis serologies evaluated that were unremarkable. Ferritin WNL. Plan to repeat fibroscan in 2 years.         Problem List Items Addressed This Visit          Gastrointestinal and Abdominal    Elevated LFTs    Fatty liver - Primary    Idiopathic acute pancreatitis without infection or necrosis    Generalized abdominal pain    Gastritis       Hematology and Neoplasia    CLL (chronic lymphocytic leukemia) (CMS/HCC)              Bianca Austin MD         My final recommendations will be communicated back to the requesting physician by way of shared Medical record or letter to requesting physician  via fax.

## 2024-02-01 ENCOUNTER — HOSPITAL ENCOUNTER (OUTPATIENT)
Dept: RADIOLOGY | Facility: CLINIC | Age: 49
Discharge: HOME | End: 2024-02-01
Payer: COMMERCIAL

## 2024-02-01 DIAGNOSIS — K85.00 IDIOPATHIC ACUTE PANCREATITIS WITHOUT INFECTION OR NECROSIS (HHS-HCC): ICD-10-CM

## 2024-02-01 DIAGNOSIS — D18.09 HEMANGIOMA OF OTHER SITES: Primary | ICD-10-CM

## 2024-02-01 DIAGNOSIS — R93.89 ABNORMAL CT SCAN: ICD-10-CM

## 2024-02-01 PROCEDURE — A9575 INJ GADOTERATE MEGLUMI 0.1ML: HCPCS | Performed by: FAMILY MEDICINE

## 2024-02-01 PROCEDURE — 74183 MRI ABD W/O CNTR FLWD CNTR: CPT

## 2024-02-01 PROCEDURE — 2550000001 HC RX 255 CONTRASTS: Performed by: FAMILY MEDICINE

## 2024-02-01 RX ORDER — GADOTERATE MEGLUMINE 376.9 MG/ML
20 INJECTION INTRAVENOUS
Status: COMPLETED | OUTPATIENT
Start: 2024-02-01 | End: 2024-02-01

## 2024-02-01 RX ADMIN — GADOTERATE MEGLUMINE 20 ML: 376.9 INJECTION INTRAVENOUS at 11:05

## 2024-02-02 ENCOUNTER — PATIENT OUTREACH (OUTPATIENT)
Dept: PRIMARY CARE | Facility: CLINIC | Age: 49
End: 2024-02-02
Payer: COMMERCIAL

## 2024-02-07 NOTE — ADDENDUM NOTE
Encounter addended by: Bianca Ausitn MD on: 2/7/2024 11:40 AM   Actions taken: Order Reconciliation Section accessed, Visit diagnoses modified, Order list changed, Diagnosis association updated, Results reviewed in IB

## 2024-02-09 ENCOUNTER — TELEPHONE (OUTPATIENT)
Dept: GASTROENTEROLOGY | Facility: CLINIC | Age: 49
End: 2024-02-09
Payer: COMMERCIAL

## 2024-02-09 NOTE — TELEPHONE ENCOUNTER
Called patient to discuss with him findings of MRI. Small pancreatic cyst with no worrisome features and nonspecific hepatic cyst (likely hemangioma), plan to repeat in 6 months to ensure stability.    Patient verbalized understanding.       ----- Message from Antonette Yu RN sent at 2/6/2024  8:11 AM EST -----  Regarding: FW: MRI follow up  Contact: [phone]    ----- Message -----  From: Brandon Snider  Sent: 2/5/2024   9:38 AM EST  To: [id number] Clinical Support Staff  Subject: MRI follow up                                    I just wanted to reach out to see if there are things we need to discuss from the MRI I did last week.    Thank you,

## 2024-05-14 ENCOUNTER — OFFICE VISIT (OUTPATIENT)
Dept: PRIMARY CARE | Facility: CLINIC | Age: 49
End: 2024-05-14
Payer: COMMERCIAL

## 2024-05-14 VITALS
HEIGHT: 70 IN | SYSTOLIC BLOOD PRESSURE: 122 MMHG | TEMPERATURE: 97.8 F | HEART RATE: 89 BPM | BODY MASS INDEX: 36.15 KG/M2 | DIASTOLIC BLOOD PRESSURE: 84 MMHG | WEIGHT: 252.5 LBS

## 2024-05-14 DIAGNOSIS — H69.93 EUSTACHIAN TUBE DYSFUNCTION, BILATERAL: Primary | ICD-10-CM

## 2024-05-14 PROCEDURE — 1036F TOBACCO NON-USER: CPT

## 2024-05-14 PROCEDURE — 3079F DIAST BP 80-89 MM HG: CPT

## 2024-05-14 PROCEDURE — 3074F SYST BP LT 130 MM HG: CPT

## 2024-05-14 PROCEDURE — 99214 OFFICE O/P EST MOD 30 MIN: CPT

## 2024-05-14 RX ORDER — FLUTICASONE PROPIONATE 50 MCG
1 SPRAY, SUSPENSION (ML) NASAL DAILY
COMMUNITY

## 2024-05-14 RX ORDER — METHYLPREDNISOLONE 4 MG/1
TABLET ORAL
Qty: 21 TABLET | Refills: 0 | Status: SHIPPED | OUTPATIENT
Start: 2024-05-14 | End: 2024-05-14 | Stop reason: ENTERED-IN-ERROR

## 2024-05-14 RX ORDER — PREDNISONE 20 MG/1
TABLET ORAL
Qty: 18 TABLET | Refills: 0 | Status: SHIPPED | OUTPATIENT
Start: 2024-05-14 | End: 2024-05-23

## 2024-05-14 NOTE — PROGRESS NOTES
"Subjective   Brandon Snider is a 49 y.o. male who presents for Earache (Was seen in an  for ears and still having issues - had gone twice /[place] was on 2 different abx ).  Patient presents for ear fullness sensation and  congestion. He has been treated with 2 antibiotics already through urgent care.         ROS negative unless otherwise stated in HPI.     /84   Pulse 89   Temp 36.6 °C (97.8 °F)   Ht 1.778 m (5' 10\")   Wt 115 kg (252 lb 8 oz)   BMI 36.23 kg/m²    Objective        Physical Exam  Constitutional:       Appearance: Normal appearance.   HENT:      Head: Normocephalic.      Right Ear: Ear canal and external ear normal. There is no impacted cerumen.      Left Ear: Ear canal and external ear normal. There is no impacted cerumen.      Ears:      Comments: Posterior fluid visualized TM Bilaterally; no erythema present     Nose: Nose normal.      Mouth/Throat:      Mouth: Mucous membranes are moist.      Pharynx: Oropharynx is clear. No oropharyngeal exudate.      Tonsils: 3+ on the right. 3+ on the left.   Cardiovascular:      Rate and Rhythm: Normal rate and regular rhythm.   Pulmonary:      Effort: Pulmonary effort is normal.      Breath sounds: Normal breath sounds. No wheezing, rhonchi or rales.   Musculoskeletal:      Cervical back: Neck supple.   Skin:     General: Skin is warm and dry.   Neurological:      Mental Status: He is alert and oriented to person, place, and time.   Psychiatric:         Mood and Affect: Mood normal.         Assessment/Plan   Multiple treatments completed without relief.   Take medication as directed. No other concerns today.  Follow up as scheduled  Problem List Items Addressed This Visit    None  Visit Diagnoses       Eustachian tube dysfunction, bilateral    -  Primary    Relevant Medications    predniSONE (Deltasone) 20 mg tablet               "

## 2024-06-17 ENCOUNTER — APPOINTMENT (OUTPATIENT)
Dept: PRIMARY CARE | Facility: CLINIC | Age: 49
End: 2024-06-17
Payer: COMMERCIAL

## 2024-06-17 VITALS
DIASTOLIC BLOOD PRESSURE: 82 MMHG | HEART RATE: 86 BPM | HEIGHT: 70 IN | BODY MASS INDEX: 36.51 KG/M2 | RESPIRATION RATE: 18 BRPM | WEIGHT: 255 LBS | SYSTOLIC BLOOD PRESSURE: 118 MMHG | TEMPERATURE: 97.3 F

## 2024-06-17 DIAGNOSIS — I10 PRIMARY HYPERTENSION: Primary | ICD-10-CM

## 2024-06-17 DIAGNOSIS — E78.00 HYPERCHOLESTEROLEMIA: ICD-10-CM

## 2024-06-17 DIAGNOSIS — F41.1 GENERALIZED ANXIETY DISORDER: ICD-10-CM

## 2024-06-17 PROBLEM — D72.829 LEUKOCYTOSIS: Status: ACTIVE | Noted: 2023-07-12

## 2024-06-17 PROBLEM — J06.9 ACUTE UPPER RESPIRATORY INFECTION: Status: ACTIVE | Noted: 2024-06-17

## 2024-06-17 PROCEDURE — 90750 HZV VACC RECOMBINANT IM: CPT | Performed by: FAMILY MEDICINE

## 2024-06-17 PROCEDURE — 90471 IMMUNIZATION ADMIN: CPT | Performed by: FAMILY MEDICINE

## 2024-06-17 PROCEDURE — 99214 OFFICE O/P EST MOD 30 MIN: CPT | Performed by: FAMILY MEDICINE

## 2024-06-17 PROCEDURE — 1036F TOBACCO NON-USER: CPT | Performed by: FAMILY MEDICINE

## 2024-06-17 PROCEDURE — 3079F DIAST BP 80-89 MM HG: CPT | Performed by: FAMILY MEDICINE

## 2024-06-17 PROCEDURE — 3074F SYST BP LT 130 MM HG: CPT | Performed by: FAMILY MEDICINE

## 2024-06-17 RX ORDER — LISINOPRIL 40 MG/1
60 TABLET ORAL DAILY
Qty: 45 TABLET | Refills: 6 | Status: SHIPPED | OUTPATIENT
Start: 2024-06-17

## 2024-06-17 RX ORDER — AMLODIPINE BESYLATE 10 MG/1
10 TABLET ORAL DAILY
Qty: 30 TABLET | Refills: 6 | Status: SHIPPED | OUTPATIENT
Start: 2024-06-17

## 2024-06-17 RX ORDER — FENOFIBRIC ACID 135 MG/1
135 CAPSULE, DELAYED RELEASE ORAL DAILY
Qty: 30 CAPSULE | Refills: 6 | Status: SHIPPED | OUTPATIENT
Start: 2024-06-17

## 2024-06-17 NOTE — PROGRESS NOTES
Brandon Snider is a 49 y.o. male here today for   Chief Complaint   Patient presents with    Hyperlipidemia    Hypertension    Anxiety        HTN recheck -- Patient denies chest pain, SOB, edema, palpitations on review.  Taking medication correctly and denies any side effects.    HLD recheck -- Patient taking medications correctly.  No SE's of muscle pain or joint pain.  No CP, edema, myalgias.    Mood disorder recheck -- Patient feels like condition is well controlled.  Has good control of mood and emotional reactions.  No SE's or problems with medications.  No homicidal or suicidal ideation.  Patient wishes to continue same medications.  He has been weaning off Lexapro - taking it every other day.          Current Outpatient Medications:     cetirizine HCl/pseudoephedrine (ZYRTEC-D ORAL), Take by mouth., Disp: , Rfl:     escitalopram (Lexapro) 10 mg tablet, Take 1 tablet (10 mg) by mouth once daily. (Patient taking differently: Take 1 tablet (10 mg) by mouth every other day.), Disp: 30 tablet, Rfl: 6    fluticasone (Flonase) 50 mcg/actuation nasal spray, Administer 1 spray into each nostril once daily. Shake gently. Before first use, prime pump. After use, clean tip and replace cap., Disp: , Rfl:     amLODIPine (Norvasc) 10 mg tablet, Take 1 tablet (10 mg) by mouth once daily., Disp: 30 tablet, Rfl: 6    choline fenofibrate (Trilipix) 135 mg DR capsule, Take 1 capsule (135 mg) by mouth once daily., Disp: 30 capsule, Rfl: 6    lisinopril 40 mg tablet, Take 1.5 tablets (60 mg) by mouth once daily., Disp: 45 tablet, Rfl: 6    Patient Active Problem List   Diagnosis    Allergic rhinitis    Generalized anxiety disorder    CLL (chronic lymphocytic leukemia) (Multi)    Elevated LFTs    Fatty liver    Flu    Hypercholesterolemia    Primary hypertension    Lipoma of chest wall    Mass of chest wall    Pulmonary nodule    Class 2 severe obesity due to excess calories with serious comorbidity and body mass index (BMI) of  "38.0 to 38.9 in adult (Multi)    Idiopathic acute pancreatitis without infection or necrosis (HHS-HCC)    Other ascites    Obesity, Class II, BMI 35-39.9    Generalized abdominal pain    Gastritis    Abnormal CT scan    Acute upper respiratory infection    Leukocytosis         No results found for this or any previous visit (from the past 672 hour(s)).     Objective    Visit Vitals    Visit Vitals  /82   Pulse 86   Temp 36.3 °C (97.3 °F)   Resp 18   Ht 1.778 m (5' 10\")   Wt 116 kg (255 lb)   BMI 36.59 kg/m²   Smoking Status Never   BSA 2.39 m²       Body mass index is 36.59 kg/m².     Physical Exam   General - Not in acute distress and cooperative.  Build & Nutrition - Well developed  Posture - Normal  Gait - Normal  Mental Status - alert and oriented x 3    Head - Normocephalic    Neck - Thyroid normal size    Eyes - Bilateral - Sclera clear and lids pink without edema or mass.      Skin - Warm and dry with no rashes on visible skin    Lungs - Clear to auscultation and normal breathing effort    Cardiovascular - RRR and no murmurs, rubs or thrill.    Peripheral Vascular - Bilateral - no edema present    Neuropsychiatric - normal mood and affect        Assessment    1. Primary hypertension  Comprehensive Metabolic Panel, CBC, Lipid Panel, amLODIPine (Norvasc) 10 mg tablet, lisinopril 40 mg tablet   Condition well controlled.  No change in current treatment regimen.  Refill given of current medication.  Appropriate labs ordered or reviewed.  Make a follow up appointment with me for recheck in 6 months.       2. Hypercholesterolemia  Comprehensive Metabolic Panel, CBC, Lipid Panel, choline fenofibrate (Trilipix) 135 mg DR capsule   Condition well controlled.  No change in current treatment regimen.  Refill given of current medication.  Appropriate labs ordered or reviewed.  Make a follow up appointment with me for recheck in 6 months.       3. Generalized anxiety disorder     He is weaning down from Lexapro and " reports no problems.  I recommend he take it every other day for another few weeks and if he is feeling well then he can discontinue it.  If he has any recurrence of anxiety symptoms he can restart the medicine at 10 mg daily and let us know.         Orders Placed This Encounter      amLODIPine (Norvasc) 10 mg tablet      choline fenofibrate (Trilipix) 135 mg DR capsule      lisinopril 40 mg tablet       Orders Placed This Encounter   Procedures    Zoster vaccine, recombinant, adult (SHINGRIX)    Comprehensive Metabolic Panel    CBC    Lipid Panel        New Medications Ordered This Visit   Medications    amLODIPine (Norvasc) 10 mg tablet     Sig: Take 1 tablet (10 mg) by mouth once daily.     Dispense:  30 tablet     Refill:  6    lisinopril 40 mg tablet     Sig: Take 1.5 tablets (60 mg) by mouth once daily.     Dispense:  45 tablet     Refill:  6    choline fenofibrate (Trilipix) 135 mg DR capsule     Sig: Take 1 capsule (135 mg) by mouth once daily.     Dispense:  30 capsule     Refill:  6

## 2024-06-20 ENCOUNTER — TELEPHONE (OUTPATIENT)
Dept: PRIMARY CARE | Facility: CLINIC | Age: 49
End: 2024-06-20

## 2024-06-20 ENCOUNTER — LAB (OUTPATIENT)
Dept: LAB | Facility: LAB | Age: 49
End: 2024-06-20
Payer: COMMERCIAL

## 2024-06-20 DIAGNOSIS — I10 PRIMARY HYPERTENSION: ICD-10-CM

## 2024-06-20 DIAGNOSIS — E78.00 HYPERCHOLESTEROLEMIA: ICD-10-CM

## 2024-06-20 DIAGNOSIS — C91.10 CLL (CHRONIC LYMPHOCYTIC LEUKEMIA) (MULTI): ICD-10-CM

## 2024-06-20 LAB
ALBUMIN SERPL BCP-MCNC: 4.8 G/DL (ref 3.4–5)
ALP SERPL-CCNC: 53 U/L (ref 33–120)
ALT SERPL W P-5'-P-CCNC: 31 U/L (ref 10–52)
ANION GAP SERPL CALC-SCNC: 12 MMOL/L (ref 10–20)
AST SERPL W P-5'-P-CCNC: 22 U/L (ref 9–39)
BILIRUB SERPL-MCNC: 0.5 MG/DL (ref 0–1.2)
BUN SERPL-MCNC: 11 MG/DL (ref 6–23)
CALCIUM SERPL-MCNC: 9.9 MG/DL (ref 8.6–10.3)
CHLORIDE SERPL-SCNC: 103 MMOL/L (ref 98–107)
CHOLEST SERPL-MCNC: 179 MG/DL (ref 0–199)
CHOLESTEROL/HDL RATIO: 3.7
CO2 SERPL-SCNC: 28 MMOL/L (ref 21–32)
CREAT SERPL-MCNC: 0.67 MG/DL (ref 0.5–1.3)
EGFRCR SERPLBLD CKD-EPI 2021: >90 ML/MIN/1.73M*2
ERYTHROCYTE [DISTWIDTH] IN BLOOD BY AUTOMATED COUNT: 13.9 % (ref 11.5–14.5)
GLUCOSE SERPL-MCNC: 91 MG/DL (ref 74–99)
HCT VFR BLD AUTO: 41.2 % (ref 41–52)
HDLC SERPL-MCNC: 48.5 MG/DL
HGB BLD-MCNC: 13 G/DL (ref 13.5–17.5)
LDLC SERPL CALC-MCNC: 115 MG/DL
MCH RBC QN AUTO: 28.5 PG (ref 26–34)
MCHC RBC AUTO-ENTMCNC: 31.6 G/DL (ref 32–36)
MCV RBC AUTO: 90 FL (ref 80–100)
NON HDL CHOLESTEROL: 131 MG/DL (ref 0–149)
NRBC BLD-RTO: 0 /100 WBCS (ref 0–0)
PLATELET # BLD AUTO: 358 X10*3/UL (ref 150–450)
POTASSIUM SERPL-SCNC: 4.6 MMOL/L (ref 3.5–5.3)
PROT SERPL-MCNC: 7 G/DL (ref 6.4–8.2)
RBC # BLD AUTO: 4.56 X10*6/UL (ref 4.5–5.9)
SODIUM SERPL-SCNC: 138 MMOL/L (ref 136–145)
TRIGL SERPL-MCNC: 77 MG/DL (ref 0–149)
VLDL: 15 MG/DL (ref 0–40)
WBC # BLD AUTO: 65.5 X10*3/UL (ref 4.4–11.3)

## 2024-06-20 PROCEDURE — 85007 BL SMEAR W/DIFF WBC COUNT: CPT

## 2024-06-20 PROCEDURE — 80061 LIPID PANEL: CPT

## 2024-06-20 PROCEDURE — 36415 COLL VENOUS BLD VENIPUNCTURE: CPT

## 2024-06-20 PROCEDURE — 80053 COMPREHEN METABOLIC PANEL: CPT

## 2024-06-20 PROCEDURE — 85027 COMPLETE CBC AUTOMATED: CPT

## 2024-06-21 DIAGNOSIS — C91.10 CLL (CHRONIC LYMPHOCYTIC LEUKEMIA) (MULTI): Primary | ICD-10-CM

## 2024-06-21 LAB
BASOPHILS # BLD MANUAL: 0 X10*3/UL (ref 0–0.1)
BASOPHILS NFR BLD MANUAL: 0 %
EOSINOPHIL # BLD MANUAL: 0.66 X10*3/UL (ref 0–0.7)
EOSINOPHIL NFR BLD MANUAL: 1 %
ERYTHROCYTE [DISTWIDTH] IN BLOOD BY AUTOMATED COUNT: 13.9 % (ref 11.5–14.5)
HCT VFR BLD AUTO: 41.2 % (ref 41–52)
HGB BLD-MCNC: 13 G/DL (ref 13.5–17.5)
IMM GRANULOCYTES # BLD AUTO: 0.6 X10*3/UL (ref 0–0.7)
IMM GRANULOCYTES NFR BLD AUTO: 0.9 % (ref 0–0.9)
LYMPHOCYTES # BLD MANUAL: 59.61 X10*3/UL (ref 1.2–4.8)
LYMPHOCYTES NFR BLD MANUAL: 91 %
MCH RBC QN AUTO: 28.5 PG (ref 26–34)
MCHC RBC AUTO-ENTMCNC: 31.6 G/DL (ref 32–36)
MCV RBC AUTO: 90 FL (ref 80–100)
MONOCYTES # BLD MANUAL: 1.31 X10*3/UL (ref 0.1–1)
MONOCYTES NFR BLD MANUAL: 2 %
NEUTS SEG # BLD MANUAL: 1.97 X10*3/UL (ref 1.2–7)
NEUTS SEG NFR BLD MANUAL: 3 %
NRBC BLD-RTO: 0 /100 WBCS (ref 0–0)
PATH REVIEW-CBC DIFFERENTIAL: NORMAL
PLATELET # BLD AUTO: 358 X10*3/UL (ref 150–450)
RBC # BLD AUTO: 4.56 X10*6/UL (ref 4.5–5.9)
RBC MORPH BLD: ABNORMAL
TOTAL CELLS COUNTED BLD: 100
VARIANT LYMPHS # BLD MANUAL: 1.97 X10*3/UL (ref 0–0.5)
VARIANT LYMPHS NFR BLD: 3 %
WBC # BLD AUTO: 65.5 X10*3/UL (ref 4.4–11.3)

## 2024-06-21 NOTE — PROGRESS NOTES
See telephone messages.  At Dr. Zavala's advice I ordered a CBC with differential because of his elevated WBCs.

## 2024-06-21 NOTE — RESULT ENCOUNTER NOTE
Please inform the patient that his recent white blood cell count has elevated a lot since 5 months ago.  It is now 65.5 and it was 38.3  5 months ago.  I am going to forward this information to his oncologist and I would like him to call Dr. Zavala's office to set up an appointment in the near future.    Mary Zavala.  I am forwarding this message and information on to you just so you are aware.

## 2024-06-26 ENCOUNTER — OFFICE VISIT (OUTPATIENT)
Dept: PRIMARY CARE | Facility: CLINIC | Age: 49
End: 2024-06-26
Payer: COMMERCIAL

## 2024-06-26 VITALS
DIASTOLIC BLOOD PRESSURE: 90 MMHG | BODY MASS INDEX: 36.65 KG/M2 | HEART RATE: 78 BPM | TEMPERATURE: 97.8 F | SYSTOLIC BLOOD PRESSURE: 148 MMHG | RESPIRATION RATE: 18 BRPM | WEIGHT: 256 LBS | HEIGHT: 70 IN
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
## 2024-06-26 PROCEDURE — 1036F TOBACCO NON-USER: CPT | Performed by: FAMILY MEDICINE

## 2024-06-26 PROCEDURE — 99214 OFFICE O/P EST MOD 30 MIN: CPT | Performed by: FAMILY MEDICINE

## 2024-06-26 PROCEDURE — 3077F SYST BP >= 140 MM HG: CPT | Performed by: FAMILY MEDICINE

## 2024-06-26 PROCEDURE — 3080F DIAST BP >= 90 MM HG: CPT | Performed by: FAMILY MEDICINE

## 2024-06-26 RX ORDER — TRIAMCINOLONE ACETONIDE 1 MG/G
CREAM TOPICAL 2 TIMES DAILY
Qty: 30 G | Refills: 0 | Status: SHIPPED | OUTPATIENT
Start: 2024-06-26

## 2024-06-26 RX ORDER — CEPHALEXIN 500 MG/1
500 CAPSULE ORAL 2 TIMES DAILY
Qty: 14 CAPSULE | Refills: 0 | Status: SHIPPED | OUTPATIENT
Start: 2024-06-26 | End: 2024-07-03

## 2024-06-26 NOTE — PROGRESS NOTES
Brandon Snider is a 49 y.o. male here today for   Chief Complaint   Patient presents with    bumps     Red itchy bumps on legs, 1 week         HPI   Patient has noticed some red individual bumps on his legs.  On review he says that this predated his shingles vaccine #2.  It has been present for about 10 days.  The rash is individual red spots and a central pustule especially on the left calf.  He also has a few on his right calf.  He says that his wife has a few of the same lesions on her upper chest.  They are not painful but they are slightly itchy.  He has not noticed the rash anywhere else.  He is not aware of any mosquito bites.  They do have 2 dogs but they have a regimen to prevent fleas and have not seen any fleas.      Current Outpatient Medications:     amLODIPine (Norvasc) 10 mg tablet, Take 1 tablet (10 mg) by mouth once daily., Disp: 30 tablet, Rfl: 6    cetirizine HCl/pseudoephedrine (ZYRTEC-D ORAL), Take by mouth., Disp: , Rfl:     choline fenofibrate (Trilipix) 135 mg DR capsule, Take 1 capsule (135 mg) by mouth once daily., Disp: 30 capsule, Rfl: 6    escitalopram (Lexapro) 10 mg tablet, Take 1 tablet (10 mg) by mouth once daily. (Patient taking differently: Take 1 tablet (10 mg) by mouth every other day.), Disp: 30 tablet, Rfl: 6    fluticasone (Flonase) 50 mcg/actuation nasal spray, Administer 1 spray into each nostril once daily. Shake gently. Before first use, prime pump. After use, clean tip and replace cap., Disp: , Rfl:     lisinopril 40 mg tablet, Take 1.5 tablets (60 mg) by mouth once daily., Disp: 45 tablet, Rfl: 6    cephalexin (Keflex) 500 mg capsule, Take 1 capsule (500 mg) by mouth 2 times a day for 7 days., Disp: 14 capsule, Rfl: 0    triamcinolone (Kenalog) 0.1 % cream, Apply topically 2 times a day., Disp: 30 g, Rfl: 0    Patient Active Problem List   Diagnosis    Allergic rhinitis    Generalized anxiety disorder    CLL (chronic lymphocytic leukemia) (Multi)    Elevated LFTs     Fatty liver    Flu    Hypercholesterolemia    Primary hypertension    Lipoma of chest wall    Mass of chest wall    Pulmonary nodule    Class 2 severe obesity due to excess calories with serious comorbidity and body mass index (BMI) of 38.0 to 38.9 in adult (Multi)    Idiopathic acute pancreatitis without infection or necrosis (HHS-HCC)    Other ascites    Obesity, Class II, BMI 35-39.9    Generalized abdominal pain    Gastritis    Abnormal CT scan    Acute upper respiratory infection    Leukocytosis         Recent Results (from the past 672 hour(s))   Comprehensive Metabolic Panel    Collection Time: 06/20/24  9:05 AM   Result Value Ref Range    Glucose 91 74 - 99 mg/dL    Sodium 138 136 - 145 mmol/L    Potassium 4.6 3.5 - 5.3 mmol/L    Chloride 103 98 - 107 mmol/L    Bicarbonate 28 21 - 32 mmol/L    Anion Gap 12 10 - 20 mmol/L    Urea Nitrogen 11 6 - 23 mg/dL    Creatinine 0.67 0.50 - 1.30 mg/dL    eGFR >90 >60 mL/min/1.73m*2    Calcium 9.9 8.6 - 10.3 mg/dL    Albumin 4.8 3.4 - 5.0 g/dL    Alkaline Phosphatase 53 33 - 120 U/L    Total Protein 7.0 6.4 - 8.2 g/dL    AST 22 9 - 39 U/L    Bilirubin, Total 0.5 0.0 - 1.2 mg/dL    ALT 31 10 - 52 U/L   CBC    Collection Time: 06/20/24  9:05 AM   Result Value Ref Range    WBC 65.5 (HH) 4.4 - 11.3 x10*3/uL    nRBC 0.0 0.0 - 0.0 /100 WBCs    RBC 4.56 4.50 - 5.90 x10*6/uL    Hemoglobin 13.0 (L) 13.5 - 17.5 g/dL    Hematocrit 41.2 41.0 - 52.0 %    MCV 90 80 - 100 fL    MCH 28.5 26.0 - 34.0 pg    MCHC 31.6 (L) 32.0 - 36.0 g/dL    RDW 13.9 11.5 - 14.5 %    Platelets 358 150 - 450 x10*3/uL   Lipid Panel    Collection Time: 06/20/24  9:05 AM   Result Value Ref Range    Cholesterol 179 0 - 199 mg/dL    HDL-Cholesterol 48.5 mg/dL    Cholesterol/HDL Ratio 3.7     LDL Calculated 115 (H) <=99 mg/dL    VLDL 15 0 - 40 mg/dL    Triglycerides 77 0 - 149 mg/dL    Non HDL Cholesterol 131 0 - 149 mg/dL   CBC and Auto Differential    Collection Time: 06/20/24  9:05 AM   Result Value Ref Range  "   WBC 65.5 (HH) 4.4 - 11.3 x10*3/uL    nRBC 0.0 0.0 - 0.0 /100 WBCs    RBC 4.56 4.50 - 5.90 x10*6/uL    Hemoglobin 13.0 (L) 13.5 - 17.5 g/dL    Hematocrit 41.2 41.0 - 52.0 %    MCV 90 80 - 100 fL    MCH 28.5 26.0 - 34.0 pg    MCHC 31.6 (L) 32.0 - 36.0 g/dL    RDW 13.9 11.5 - 14.5 %    Platelets 358 150 - 450 x10*3/uL    Immature Granulocytes %, Automated 0.9 0.0 - 0.9 %    Immature Granulocytes Absolute, Automated 0.60 0.00 - 0.70 x10*3/uL   Manual Differential    Collection Time: 06/20/24  9:05 AM   Result Value Ref Range    Neutrophils %, Manual 3.0 40.0 - 80.0 %    Lymphocytes %, Manual 91.0 13.0 - 44.0 %    Monocytes %, Manual 2.0 2.0 - 10.0 %    Eosinophils %, Manual 1.0 0.0 - 6.0 %    Basophils %, Manual 0.0 0.0 - 2.0 %    Atypical Lymphocytes %, Manual 3.0 0.0 - 2.0 %    Seg Neutrophils Absolute, Manual 1.97 1.20 - 7.00 x10*3/uL    Lymphocytes Absolute, Manual 59.61 (H) 1.20 - 4.80 x10*3/uL    Monocytes Absolute, Manual 1.31 (H) 0.10 - 1.00 x10*3/uL    Eosinophils Absolute, Manual 0.66 0.00 - 0.70 x10*3/uL    Basophils Absolute, Manual 0.00 0.00 - 0.10 x10*3/uL    Atypical Lymphs Absolute, Manual 1.97 (H) 0.00 - 0.50 x10*3/uL    Total Cells Counted 100     RBC Morphology No significant RBC morphology present    Pathologist Review-CBC Differential    Collection Time: 06/20/24  9:05 AM   Result Value Ref Range    Pathologist Review-CBC Differential       Lymphocytosis consistent with low-grade lymphoproliferative disorder.    Patient with known history of chronic lymphocytic leukemia/small lymphocytic lymphoma (CLL/SLL).        Objective    Visit Vitals    Visit Vitals  /90   Pulse 78   Temp 36.6 °C (97.8 °F)   Resp 18   Ht 1.778 m (5' 10\")   Wt 116 kg (256 lb)   BMI 36.73 kg/m²   Smoking Status Never   BSA 2.39 m²       Body mass index is 36.73 kg/m².     Physical Exam   Legs-on his left calf and right calf are individual skin lesions measuring about 3 to 4 mm in size.  There is slight erythema and a " small central pustule on most of the lesions.  They are slightly raised but not tender.  There are about 10 on his left calf and about 4 on his right calf.  There is no other erythema or streaking.    Assessment    1. Bug bite with infection, initial encounter  cephalexin (Keflex) 500 mg capsule, triamcinolone (Kenalog) 0.1 % cream   I think these lesions probably represent some type of infected bug bite or spider bite.  We discussed ways to eliminate spiders from the bedroom including laundering the bed sheets and vacuuming the bedroom very well especially in the corners.  We will treat with Keflex x 7 days and triamcinolone cream topically.  The patient is at increased risk for infection because of CLL and his white blood cell count recently elevated significantly.  His hematologist is aware of this.  He does have an appointment to see his hematologist in about 2 weeks.  I recommend to call us and make a follow up appointment if sxs worsen or do not resolve.               Orders Placed This Encounter      cephalexin (Keflex) 500 mg capsule      triamcinolone (Kenalog) 0.1 % cream       No orders of the defined types were placed in this encounter.       New Medications Ordered This Visit   Medications    cephalexin (Keflex) 500 mg capsule     Sig: Take 1 capsule (500 mg) by mouth 2 times a day for 7 days.     Dispense:  14 capsule     Refill:  0    triamcinolone (Kenalog) 0.1 % cream     Sig: Apply topically 2 times a day.     Dispense:  30 g     Refill:  0

## 2024-07-10 ENCOUNTER — LAB (OUTPATIENT)
Dept: LAB | Facility: CLINIC | Age: 49
End: 2024-07-10
Payer: COMMERCIAL

## 2024-07-10 ENCOUNTER — OFFICE VISIT (OUTPATIENT)
Dept: HEMATOLOGY/ONCOLOGY | Facility: CLINIC | Age: 49
End: 2024-07-10
Payer: COMMERCIAL

## 2024-07-10 VITALS
TEMPERATURE: 96.4 F | WEIGHT: 254.63 LBS | HEART RATE: 82 BPM | BODY MASS INDEX: 36.54 KG/M2 | SYSTOLIC BLOOD PRESSURE: 136 MMHG | DIASTOLIC BLOOD PRESSURE: 85 MMHG | OXYGEN SATURATION: 97 % | RESPIRATION RATE: 17 BRPM

## 2024-07-10 DIAGNOSIS — C91.10 CLL (CHRONIC LYMPHOCYTIC LEUKEMIA) (MULTI): ICD-10-CM

## 2024-07-10 DIAGNOSIS — E78.00 HYPERCHOLESTEROLEMIA: ICD-10-CM

## 2024-07-10 DIAGNOSIS — I10 PRIMARY HYPERTENSION: Primary | ICD-10-CM

## 2024-07-10 LAB
ALBUMIN SERPL BCP-MCNC: 4.7 G/DL (ref 3.4–5)
ALP SERPL-CCNC: 47 U/L (ref 33–120)
ALT SERPL W P-5'-P-CCNC: 31 U/L (ref 10–52)
ANION GAP SERPL CALC-SCNC: 11 MMOL/L (ref 10–20)
AST SERPL W P-5'-P-CCNC: 20 U/L (ref 9–39)
BASOPHILS # BLD MANUAL: 0 X10*3/UL (ref 0–0.1)
BASOPHILS NFR BLD MANUAL: 0 %
BILIRUB SERPL-MCNC: 0.4 MG/DL (ref 0–1.2)
BUN SERPL-MCNC: 12 MG/DL (ref 6–23)
CALCIUM SERPL-MCNC: 9.5 MG/DL (ref 8.6–10.3)
CHLORIDE SERPL-SCNC: 102 MMOL/L (ref 98–107)
CO2 SERPL-SCNC: 29 MMOL/L (ref 21–32)
CREAT SERPL-MCNC: 0.79 MG/DL (ref 0.5–1.3)
EGFRCR SERPLBLD CKD-EPI 2021: >90 ML/MIN/1.73M*2
EOSINOPHIL # BLD MANUAL: 0.71 X10*3/UL (ref 0–0.7)
EOSINOPHIL NFR BLD MANUAL: 1 %
ERYTHROCYTE [DISTWIDTH] IN BLOOD BY AUTOMATED COUNT: 13.8 % (ref 11.5–14.5)
FERRITIN SERPL-MCNC: 105 NG/ML (ref 20–300)
GLUCOSE SERPL-MCNC: 124 MG/DL (ref 74–99)
HCT VFR BLD AUTO: 39.5 % (ref 41–52)
HGB BLD-MCNC: 12.5 G/DL (ref 13.5–17.5)
IMM GRANULOCYTES # BLD AUTO: 0.68 X10*3/UL (ref 0–0.7)
IMM GRANULOCYTES NFR BLD AUTO: 1 % (ref 0–0.9)
IRON SATN MFR SERPL: 20 % (ref 25–45)
IRON SERPL-MCNC: 82 UG/DL (ref 35–150)
LYMPHOCYTES # BLD MANUAL: 62.39 X10*3/UL (ref 1.2–4.8)
LYMPHOCYTES NFR BLD MANUAL: 88 %
MCH RBC QN AUTO: 28.8 PG (ref 26–34)
MCHC RBC AUTO-ENTMCNC: 31.6 G/DL (ref 32–36)
MCV RBC AUTO: 91 FL (ref 80–100)
MONOCYTES # BLD MANUAL: 1.42 X10*3/UL (ref 0.1–1)
MONOCYTES NFR BLD MANUAL: 2 %
NEUTS SEG # BLD MANUAL: 4.25 X10*3/UL (ref 1.2–7)
NEUTS SEG NFR BLD MANUAL: 6 %
NRBC BLD-RTO: ABNORMAL /100{WBCS}
PLATELET # BLD AUTO: 295 X10*3/UL (ref 150–450)
POTASSIUM SERPL-SCNC: 4.2 MMOL/L (ref 3.5–5.3)
PROT SERPL-MCNC: 6.8 G/DL (ref 6.4–8.2)
RBC # BLD AUTO: 4.34 X10*6/UL (ref 4.5–5.9)
RBC MORPH BLD: ABNORMAL
SODIUM SERPL-SCNC: 138 MMOL/L (ref 136–145)
TIBC SERPL-MCNC: 405 UG/DL (ref 240–445)
TOTAL CELLS COUNTED BLD: 100
UIBC SERPL-MCNC: 323 UG/DL (ref 110–370)
VARIANT LYMPHS # BLD MANUAL: 2.13 X10*3/UL (ref 0–0.5)
VARIANT LYMPHS NFR BLD: 3 %
WBC # BLD AUTO: 70.9 X10*3/UL (ref 4.4–11.3)

## 2024-07-10 PROCEDURE — 80053 COMPREHEN METABOLIC PANEL: CPT

## 2024-07-10 PROCEDURE — 83540 ASSAY OF IRON: CPT

## 2024-07-10 PROCEDURE — 85007 BL SMEAR W/DIFF WBC COUNT: CPT

## 2024-07-10 PROCEDURE — 3079F DIAST BP 80-89 MM HG: CPT | Performed by: INTERNAL MEDICINE

## 2024-07-10 PROCEDURE — 3075F SYST BP GE 130 - 139MM HG: CPT | Performed by: INTERNAL MEDICINE

## 2024-07-10 PROCEDURE — 99214 OFFICE O/P EST MOD 30 MIN: CPT | Performed by: INTERNAL MEDICINE

## 2024-07-10 PROCEDURE — 82728 ASSAY OF FERRITIN: CPT

## 2024-07-10 PROCEDURE — 36415 COLL VENOUS BLD VENIPUNCTURE: CPT

## 2024-07-10 PROCEDURE — 85027 COMPLETE CBC AUTOMATED: CPT

## 2024-07-10 ASSESSMENT — PAIN SCALES - GENERAL: PAINLEVEL: 0-NO PAIN

## 2024-07-11 ASSESSMENT — ENCOUNTER SYMPTOMS
PSYCHIATRIC NEGATIVE: 1
CARDIOVASCULAR NEGATIVE: 1
NEUROLOGICAL NEGATIVE: 1
EYES NEGATIVE: 1
GASTROINTESTINAL NEGATIVE: 1
ENDOCRINE NEGATIVE: 1
RESPIRATORY NEGATIVE: 1
HEMATOLOGIC/LYMPHATIC NEGATIVE: 1
CONSTITUTIONAL NEGATIVE: 1
MUSCULOSKELETAL NEGATIVE: 1

## 2024-07-11 NOTE — PROGRESS NOTES
Patient ID: Brandon Snider is a 49 y.o. male.  Referring Physician: Gage Zavala MD  [id number]  [place] [address]  Primary Care Provider: Bernard Hernandez MD  Visit Type: Follow Up      Subjective    HPI How was my bloodwork?    Review of Systems   Constitutional: Negative.    HENT:  Negative.     Eyes: Negative.    Respiratory: Negative.     Cardiovascular: Negative.    Gastrointestinal: Negative.    Endocrine: Negative.    Genitourinary: Negative.     Musculoskeletal: Negative.    Skin: Negative.    Neurological: Negative.    Hematological: Negative.    Psychiatric/Behavioral: Negative.          Objective   BSA: 2.38 meters squared  /85 (BP Location: Right arm, Patient Position: Sitting, BP Cuff Size: Large adult long)   Pulse 82   Temp 35.8 °C (96.4 °F)   Resp 17   Wt 115 kg (254 lb 10.1 oz)   SpO2 97%   BMI 36.54 kg/m²      has a past medical history of Encounter for general adult medical examination without abnormal findings (11/05/2019), Hypertension, Obesity, unspecified (11/05/2019), Other specified abnormal findings of blood chemistry (04/29/2021), Other specified counseling (12/14/2020), Personal history of diseases of the blood and blood-forming organs and certain disorders involving the immune mechanism (04/29/2021), and Sinobronchitis (01/27/2023).   has no past surgical history on file.  Family History   Problem Relation Name Age of Onset    Esophageal cancer Mother      Hypertension Mother      Other (elevated liver enzymes) Father      Other (cardiac disorder) Father      Prostate cancer Father      Prostate cancer Paternal Grandfather      Prostate cancer Other grandfather      Oncology History    No history exists.       Brandon Snider  reports that he has never smoked. He has never used smokeless tobacco.  He  reports current alcohol use.  He  reports no history of drug use.    Physical Exam  Vitals reviewed.   Constitutional:       Appearance: Normal  appearance.   HENT:      Head: Normocephalic.      Mouth/Throat:      Mouth: Mucous membranes are moist.   Eyes:      Extraocular Movements: Extraocular movements intact.      Pupils: Pupils are equal, round, and reactive to light.   Cardiovascular:      Rate and Rhythm: Regular rhythm.      Heart sounds: Normal heart sounds.   Pulmonary:      Breath sounds: Normal breath sounds.   Abdominal:      General: Bowel sounds are normal.      Palpations: Abdomen is soft.   Musculoskeletal:         General: Normal range of motion.      Cervical back: Normal range of motion and neck supple.   Skin:     General: Skin is warm.   Neurological:      General: No focal deficit present.      Mental Status: He is alert and oriented to person, place, and time.   Psychiatric:         Mood and Affect: Mood normal.         Behavior: Behavior normal.         WBC   Date/Time Value Ref Range Status   07/10/2024 03:26 PM 70.9 (HH) 4.4 - 11.3 x10*3/uL Final   06/20/2024 09:05 AM 65.5 (HH) 4.4 - 11.3 x10*3/uL Final   06/20/2024 09:05 AM 65.5 (HH) 4.4 - 11.3 x10*3/uL Final     nRBC   Date Value Ref Range Status   07/10/2024   Final     Comment:     Not Measured   06/20/2024 0.0 0.0 - 0.0 /100 WBCs Final   06/20/2024 0.0 0.0 - 0.0 /100 WBCs Final     RBC   Date Value Ref Range Status   07/10/2024 4.34 (L) 4.50 - 5.90 x10*6/uL Final   06/20/2024 4.56 4.50 - 5.90 x10*6/uL Final   06/20/2024 4.56 4.50 - 5.90 x10*6/uL Final     Hemoglobin   Date Value Ref Range Status   07/10/2024 12.5 (L) 13.5 - 17.5 g/dL Final   06/20/2024 13.0 (L) 13.5 - 17.5 g/dL Final   06/20/2024 13.0 (L) 13.5 - 17.5 g/dL Final     Hematocrit   Date Value Ref Range Status   07/10/2024 39.5 (L) 41.0 - 52.0 % Final   06/20/2024 41.2 41.0 - 52.0 % Final   06/20/2024 41.2 41.0 - 52.0 % Final     MCV   Date/Time Value Ref Range Status   07/10/2024 03:26 PM 91 80 - 100 fL Final   06/20/2024 09:05 AM 90 80 - 100 fL Final   06/20/2024 09:05 AM 90 80 - 100 fL Final     MCH   Date/Time  "Value Ref Range Status   07/10/2024 03:26 PM 28.8 26.0 - 34.0 pg Final   06/20/2024 09:05 AM 28.5 26.0 - 34.0 pg Final   06/20/2024 09:05 AM 28.5 26.0 - 34.0 pg Final     MCHC   Date/Time Value Ref Range Status   07/10/2024 03:26 PM 31.6 (L) 32.0 - 36.0 g/dL Final   06/20/2024 09:05 AM 31.6 (L) 32.0 - 36.0 g/dL Final   06/20/2024 09:05 AM 31.6 (L) 32.0 - 36.0 g/dL Final     RDW   Date/Time Value Ref Range Status   07/10/2024 03:26 PM 13.8 11.5 - 14.5 % Final   06/20/2024 09:05 AM 13.9 11.5 - 14.5 % Final   06/20/2024 09:05 AM 13.9 11.5 - 14.5 % Final     Platelets   Date/Time Value Ref Range Status   07/10/2024 03:26  150 - 450 x10*3/uL Final   06/20/2024 09:05  150 - 450 x10*3/uL Final   06/20/2024 09:05  150 - 450 x10*3/uL Final     No results found for: \"MPV\"  Neutrophils %   Date/Time Value Ref Range Status   11/28/2023 02:23 PM 18.2 40.0 - 80.0 % Final   07/12/2023 10:24 AM 16.2 40.0 - 80.0 % Final   06/06/2022 08:56 AM 18.8 40.0 - 80.0 % Final   12/13/2021 09:10 AM 21.2 40.0 - 80.0 % Final     Immature Granulocytes %, Automated   Date/Time Value Ref Range Status   07/10/2024 03:26 PM 1.0 (H) 0.0 - 0.9 % Final     Comment:     Immature Granulocyte Count (IG) includes promyelocytes, myelocytes and metamyelocytes but does not include bands. Percent differential counts (%) should be interpreted in the context of the absolute cell counts (cells/UL).   06/20/2024 09:05 AM 0.9 0.0 - 0.9 % Final     Comment:     Immature Granulocyte Count (IG) includes promyelocytes, myelocytes and metamyelocytes but does not include bands. Percent differential counts (%) should be interpreted in the context of the absolute cell counts (cells/UL).   01/10/2024 09:31 AM 0.5 0.0 - 0.9 % Final     Comment:     Immature Granulocyte Count (IG) includes promyelocytes, myelocytes and metamyelocytes but does not include bands. Percent differential counts (%) should be interpreted in the context of the absolute cell counts " (cells/UL).     Lymphocytes %, Manual   Date/Time Value Ref Range Status   07/10/2024 03:26 PM 88.0 13.0 - 44.0 % Final   06/20/2024 09:05 AM 91.0 13.0 - 44.0 % Final   01/10/2024 09:31 AM 76.0 13.0 - 44.0 % Final     Monocytes %, Manual   Date/Time Value Ref Range Status   07/10/2024 03:26 PM 2.0 2.0 - 10.0 % Final   06/20/2024 09:05 AM 2.0 2.0 - 10.0 % Final   01/10/2024 09:31 AM 4.0 2.0 - 10.0 % Final     Eosinophils %, Manual   Date/Time Value Ref Range Status   07/10/2024 03:26 PM 1.0 0.0 - 6.0 % Final   06/20/2024 09:05 AM 1.0 0.0 - 6.0 % Final   01/10/2024 09:31 AM 2.0 0.0 - 6.0 % Final     Basophils %, Manual   Date/Time Value Ref Range Status   07/10/2024 03:26 PM 0.0 0.0 - 2.0 % Final   06/20/2024 09:05 AM 0.0 0.0 - 2.0 % Final   01/10/2024 09:31 AM 1.0 0.0 - 2.0 % Final     Neutrophils Absolute   Date/Time Value Ref Range Status   11/28/2023 02:23 PM 6.13 1.20 - 7.70 x10*3/uL Final     Comment:     Percent differential counts (%) should be interpreted in the context of the absolute cell counts (cells/uL).   07/12/2023 10:24 AM 5.27 1.20 - 7.70 x10E9/L Final   06/06/2022 08:56 AM 4.85 1.20 - 7.70 x10E9/L Final   12/13/2021 09:10 AM 5.21 1.20 - 7.70 x10E9/L Final     Immature Granulocytes Absolute, Automated   Date/Time Value Ref Range Status   07/10/2024 03:26 PM 0.68 0.00 - 0.70 x10*3/uL Final   06/20/2024 09:05 AM 0.60 0.00 - 0.70 x10*3/uL Final   01/10/2024 09:31 AM 0.19 0.00 - 0.70 x10*3/uL Final     Lymphocytes Absolute   Date/Time Value Ref Range Status   11/28/2023 02:23 PM 23.89 (H) 1.20 - 4.80 x10*3/uL Final   07/12/2023 10:24 AM 24.47 (H) 1.20 - 4.80 x10E9/L Final   06/06/2022 08:56 AM 18.19 (H) 1.20 - 4.80 x10E9/L Final   12/13/2021 09:10 AM 16.86 (H) 1.20 - 4.80 x10E9/L Final     Monocytes Absolute   Date/Time Value Ref Range Status   11/28/2023 02:23 PM 2.48 (H) 0.10 - 1.00 x10*3/uL Final   07/12/2023 10:24 AM 2.18 (H) 0.10 - 1.00 x10E9/L Final   06/06/2022 08:56 AM 1.86 (H) 0.10 - 1.00  "x10E9/L Final   2021 09:10 AM 1.91 (H) 0.10 - 1.00 x10E9/L Final     Eosinophils Absolute, Manual   Date/Time Value Ref Range Status   07/10/2024 03:26 PM 0.71 (H) 0.00 - 0.70 x10*3/uL Final   2024 09:05 AM 0.66 0.00 - 0.70 x10*3/uL Final   01/10/2024 09:31 AM 0.77 (H) 0.00 - 0.70 x10*3/uL Final     Basophils Absolute, Manual   Date/Time Value Ref Range Status   07/10/2024 03:26 PM 0.00 0.00 - 0.10 x10*3/uL Final   2024 09:05 AM 0.00 0.00 - 0.10 x10*3/uL Final   01/10/2024 09:31 AM 0.38 (H) 0.00 - 0.10 x10*3/uL Final       No components found for: \"PT\"  No results found for: \"APTT\"  Medication Documentation Review Audit       Reviewed by Birdie Ramírez MA (Medical Assistant) on 07/10/24 at 1537      Medication Order Taking? Sig Documenting Provider Last Dose Status   amLODIPine (Norvasc) 10 mg tablet [id number] Yes Take 1 tablet (10 mg) by mouth once daily. Bernard Hernandez MD Taking Active   cephalexin (Keflex) 500 mg capsule [id number]  Take 1 capsule (500 mg) by mouth 2 times a day for 7 days. Bernard Hernandez MD   24 2359   cetirizine HCl/pseudoephedrine (ZYRTEC-D ORAL) [id number] Yes Take by mouth. Historical Provider, MD Taking Active   choline fenofibrate (Trilipix) 135 mg DR capsule [id number] Yes Take 1 capsule (135 mg) by mouth once daily. Bernard Hernandez MD Taking Active   escitalopram (Lexapro) 10 mg tablet [id number] Yes Take 1 tablet (10 mg) by mouth once daily.   Patient taking differently: Take 1 tablet (10 mg) by mouth every other day.    Bernard Hernandez MD Taking Active   fluticasone (Flonase) 50 mcg/actuation nasal spray [id number] Yes Administer 1 spray into each nostril once daily. Shake gently. Before first use, prime pump. After use, clean tip and replace cap. Historical Provider, MD Taking Active   lisinopril 40 mg tablet [id number] Yes Take 1.5 tablets (60 mg) by mouth once daily. Bernard Hernandez MD Taking Active   triamcinolone (Kenalog) 0.1 % cream [id number] Yes Apply " "topically 2 times a day. Bernard Hernandez MD Taking Active                   Assessment/Plan    1) CLL/SLL  - here for interval followup  -today's labs included CBC, COMP, iron panel + ferritin  -results reviewed--wbc 70.9, hgb 12.5, plt 295,000, ANC 4250, abs lymph 62,390, creatinine 0.79, AST 20, ALT 31, TIBC 405, sat 20%, ferritin 105  -in late November he was hospitalized at [facility] for GI issues--generalized abdominal pain, which they thought was due to pancreatitis  -he had an abdominal CT scan showed mld abdominopelvic ascites, no splenomegaly, peripancreatic/periportal lymph nodes up to 1.5 cm, and 1.9 cm portal caval node, external iliac lymph nodes bilaterally up to 2.1 cm  -he was diagnosed with H pylori and put on triple therapy (Pylera)  -[facility] set up an outpatient lymph node biopsy, however, he and his wife said that [facility] staff \"paid no attention\" to the fact that he has CLL, and that many CLL patients will have SLL (small lymphocytic lymphoma); if the biopsy had been done, it would have nearly for sure showed SLL, which would not alter management anyway  -he just had an EGD done on 1/2/2024-showing normal esophagus, edematous and erythematous mucosa in the antrum; path showed gastric oxyntic and antral mucosa with chronic gastritis; IHC was negative for H pylori  -he continues without any symptoms secondary to CLL  -we reviewed again the indications for initiation of therapy--bothersome B symptoms, development of symptomatic anemia/thrombocytopenia, symptomatic bulky lymphadenopathy, symptomatic hepatosplenomegaly  -initiation of treatment is not based on the WBC/absolute lymphocyte count, which is expected to rise over time  -will now see him Q4 months     2. HTN  -on lisinopril  -on amlodipine     3.  HLD  -on trilipix     Problem List Items Addressed This Visit             ICD-10-CM    CLL (chronic lymphocytic leukemia) (Multi) C91.10    Relevant Orders    Clinic Appointment Request Follow Up; " GAGE ZAVALA; [id number]    CBC and Auto Differential    Comprehensive metabolic panel            Gage Zavala MD

## 2024-07-22 ENCOUNTER — APPOINTMENT (OUTPATIENT)
Dept: GASTROENTEROLOGY | Facility: CLINIC | Age: 49
End: 2024-07-22
Payer: COMMERCIAL

## 2024-07-22 VITALS
DIASTOLIC BLOOD PRESSURE: 94 MMHG | SYSTOLIC BLOOD PRESSURE: 128 MMHG | BODY MASS INDEX: 36.82 KG/M2 | OXYGEN SATURATION: 95 % | WEIGHT: 257.2 LBS | RESPIRATION RATE: 18 BRPM | HEIGHT: 70 IN | HEART RATE: 80 BPM

## 2024-07-22 DIAGNOSIS — R79.89 ELEVATED LFTS: ICD-10-CM

## 2024-07-22 DIAGNOSIS — R10.30 LOWER ABDOMINAL PAIN: ICD-10-CM

## 2024-07-22 DIAGNOSIS — K29.00 OTHER ACUTE GASTRITIS WITHOUT HEMORRHAGE: ICD-10-CM

## 2024-07-22 DIAGNOSIS — R10.84 GENERALIZED ABDOMINAL PAIN: ICD-10-CM

## 2024-07-22 DIAGNOSIS — A04.8 H. PYLORI INFECTION: ICD-10-CM

## 2024-07-22 DIAGNOSIS — K76.0 HEPATIC STEATOSIS: ICD-10-CM

## 2024-07-22 DIAGNOSIS — K76.0 FATTY LIVER: Primary | ICD-10-CM

## 2024-07-22 DIAGNOSIS — C91.10 CLL (CHRONIC LYMPHOCYTIC LEUKEMIA) (MULTI): ICD-10-CM

## 2024-07-22 PROCEDURE — 3008F BODY MASS INDEX DOCD: CPT | Performed by: STUDENT IN AN ORGANIZED HEALTH CARE EDUCATION/TRAINING PROGRAM

## 2024-07-22 PROCEDURE — 99214 OFFICE O/P EST MOD 30 MIN: CPT | Performed by: STUDENT IN AN ORGANIZED HEALTH CARE EDUCATION/TRAINING PROGRAM

## 2024-07-22 PROCEDURE — 1036F TOBACCO NON-USER: CPT | Performed by: STUDENT IN AN ORGANIZED HEALTH CARE EDUCATION/TRAINING PROGRAM

## 2024-07-22 PROCEDURE — 3074F SYST BP LT 130 MM HG: CPT | Performed by: STUDENT IN AN ORGANIZED HEALTH CARE EDUCATION/TRAINING PROGRAM

## 2024-07-22 PROCEDURE — 3080F DIAST BP >= 90 MM HG: CPT | Performed by: STUDENT IN AN ORGANIZED HEALTH CARE EDUCATION/TRAINING PROGRAM

## 2024-07-22 NOTE — PROGRESS NOTES
Subjective     History of Present Illness:   Brandon Snider is a 49 y.o. male with hx of CLL and obesity who presents for follow up from recent hospital admission in 11/2023 for idiopathic pancreatitis attributed to possible hydrochlorothiazide that was discontinued on discharge. Since our last visit he completed treatment for Hpylori and documented negative re-testing. Fibroscan showing F2 fibrosis. His MRI is scheduled for 02/01.    He has been doing well for the past six months.  He denies any recurrence of abdominal pain, nausea, vomiting.  He has no weight loss.  He feels overall well.      With regards to his hx,  Patient states he was experiencing prodromal  symptoms as if he was coming down with a cold along with diarrhea, abdominal pain two weeks prior to presentation to the ED. He states he though he was experiencing issues with an abdominal hernia. He was diagnosed with acute pancreatitis based on elevated Lipase to 200s. Patient states he was experiencing pain in his lower abdomen that was cramping in nature. He had no radiation. Diarrhea resolved during admission. He had no nausea or emesis. Has not had symptoms similar to this in the past.     Review of CT scan suggests gastric and duodenal thickening as well as ?mild ascites. + hepatic steatosis. Repeat attempt at paracentesis was not successful due to insuffiencey fluid.    Review of labs showing elevated ALT to 74.     Patient states he does not smoke. He drinks 1-2 beers a month at most. No family hx of pancreatic malignancy or pancreatic disease.     Today, he states he feels at his baseline. No abdominal pain. He has 1-2 soft bowel movements daily. He has not had a prior colonoscopy. Completed cologuard two years ago that was negative per patient.         Past Medical History   has a past medical history of Encounter for general adult medical examination without abnormal findings (11/05/2019), Hypertension, Obesity, unspecified (11/05/2019),  Other specified abnormal findings of blood chemistry (04/29/2021), Other specified counseling (12/14/2020), Personal history of diseases of the blood and blood-forming organs and certain disorders involving the immune mechanism (04/29/2021), and Sinobronchitis (01/27/2023).     Social History   reports that he has never smoked. He has never used smokeless tobacco. He reports current alcohol use. He reports that he does not use drugs.     Family History  family history includes Esophageal cancer in his mother; Hypertension in his mother; Prostate cancer in his father, paternal grandfather, and another family member; cardiac disorder in his father; elevated liver enzymes in his father.     Allergies  Allergies   Allergen Reactions    Hydrochlorothiazide Other     May have caused pancreatitis 11/2023.       Medications  Current Outpatient Medications   Medication Instructions    amLODIPine (NORVASC) 10 mg, oral, Daily    cetirizine HCl/pseudoephedrine (ZYRTEC-D ORAL) oral    choline fenofibrate (TRILIPIX) 135 mg, oral, Daily    escitalopram (LEXAPRO) 10 mg, oral, Daily    fluticasone (Flonase) 50 mcg/actuation nasal spray 1 spray, Each Nostril, Daily, Shake gently. Before first use, prime pump. After use, clean tip and replace cap.    lisinopril 60 mg, oral, Daily    triamcinolone (Kenalog) 0.1 % cream Topical, 2 times daily        Objective   Visit Vitals  BP (!) 128/94 (BP Location: Right arm, Patient Position: Sitting, BP Cuff Size: Adult)   Pulse 80   Resp 18      Physical Exam  Vitals reviewed.   Constitutional:       Appearance: Normal appearance.   HENT:      Head: Normocephalic.      Mouth/Throat:      Mouth: Mucous membranes are moist.   Cardiovascular:      Rate and Rhythm: Normal rate and regular rhythm.   Pulmonary:      Effort: Pulmonary effort is normal.      Breath sounds: Normal breath sounds.   Abdominal:      General: There is no distension.   Neurological:      General: No focal deficit present.       Mental Status: He is alert.   Psychiatric:         Mood and Affect: Mood normal.         Judgment: Judgment normal.         Assessment/Plan   Brandon Snider is a 48 y.o. male with hx of CLL and obesity who presents for follow up from recent hospital admission in 11/2023 for idiopathic pancreatitis attributed to possible hydrochlorothiazide that was discontinued on discharge.     Unclear if his symptoms were due to acute pancreatis vs ?gastroenteritis. Although lipase was elevated to >3XULN, he did not have symptoms c/w pancreatic pain. CT read does not demonstrate any finding of pancreatitis (describing gastritis) though images not available for review at this time. His prodromal symptoms preceding admission suggest possible viral illness that would explain gastric thickening as well as Hpylori which has since been treated with confirmed eradication.  MRI with MRCP images to evaluate biliary tree and for any structural pathology should small pancreatic cyst. Reassuringly, he has clinically improved and is currently doing well. Due for 6 month repeat to confirm stability of pancreatic cyst. If stable, repeat in 2 years.        Of note, review of labs show mild elevation in ALT to 74 with findings of steatosis on imaging. Fibroscan with F2 fibrosis. Discussed with patient recommendation for weight loss of at least 10% along with moderate exercise for 30 minutes at least 3x weekly to minimize risk of progression to advanced fibrosis/cirrhosis. His repeat liver enzymes two weeks ago were WNL. He had prior viral hepatitis serologies evaluated that were unremarkable. Ferritin WNL. Plan to repeat fibroscan in 2 years.         Problem List Items Addressed This Visit       CLL (chronic lymphocytic leukemia) (Multi)    Elevated LFTs    Fatty liver - Primary    Generalized abdominal pain    Gastritis     Other Visit Diagnoses       H. pylori infection        Lower abdominal pain        Hepatic steatosis                          Bianca Austin MD         My final recommendations will be communicated back to the requesting physician by way of shared Medical record or letter to requesting physician via fax.

## 2024-11-13 ENCOUNTER — SPECIALTY PHARMACY (OUTPATIENT)
Dept: HEMATOLOGY/ONCOLOGY | Facility: CLINIC | Age: 49
End: 2024-11-13

## 2024-11-13 ENCOUNTER — OFFICE VISIT (OUTPATIENT)
Dept: HEMATOLOGY/ONCOLOGY | Facility: CLINIC | Age: 49
End: 2024-11-13
Payer: COMMERCIAL

## 2024-11-13 ENCOUNTER — SPECIALTY PHARMACY (OUTPATIENT)
Dept: PHARMACY | Facility: CLINIC | Age: 49
End: 2024-11-13

## 2024-11-13 ENCOUNTER — LAB (OUTPATIENT)
Dept: LAB | Facility: CLINIC | Age: 49
End: 2024-11-13
Payer: COMMERCIAL

## 2024-11-13 VITALS
HEART RATE: 77 BPM | RESPIRATION RATE: 16 BRPM | DIASTOLIC BLOOD PRESSURE: 85 MMHG | OXYGEN SATURATION: 92 % | TEMPERATURE: 97.2 F | WEIGHT: 252.65 LBS | SYSTOLIC BLOOD PRESSURE: 135 MMHG | BODY MASS INDEX: 36.25 KG/M2

## 2024-11-13 DIAGNOSIS — C91.10 CLL (CHRONIC LYMPHOCYTIC LEUKEMIA) (MULTI): ICD-10-CM

## 2024-11-13 DIAGNOSIS — E78.00 HYPERCHOLESTEROLEMIA: ICD-10-CM

## 2024-11-13 DIAGNOSIS — C91.10 CLL (CHRONIC LYMPHOCYTIC LEUKEMIA) (MULTI): Primary | ICD-10-CM

## 2024-11-13 DIAGNOSIS — I10 PRIMARY HYPERTENSION: Primary | ICD-10-CM

## 2024-11-13 LAB
ALBUMIN SERPL BCP-MCNC: 4.8 G/DL (ref 3.4–5)
ALP SERPL-CCNC: 52 U/L (ref 33–120)
ALT SERPL W P-5'-P-CCNC: 33 U/L (ref 10–52)
ANION GAP SERPL CALC-SCNC: 10 MMOL/L (ref 10–20)
AST SERPL W P-5'-P-CCNC: 21 U/L (ref 9–39)
BASOPHILS # BLD MANUAL: 0 X10*3/UL (ref 0–0.1)
BASOPHILS NFR BLD MANUAL: 0 %
BILIRUB SERPL-MCNC: 0.5 MG/DL (ref 0–1.2)
BUN SERPL-MCNC: 15 MG/DL (ref 6–23)
CALCIUM SERPL-MCNC: 9.9 MG/DL (ref 8.6–10.3)
CHLORIDE SERPL-SCNC: 104 MMOL/L (ref 98–107)
CO2 SERPL-SCNC: 29 MMOL/L (ref 21–32)
CREAT SERPL-MCNC: 0.71 MG/DL (ref 0.5–1.3)
EGFRCR SERPLBLD CKD-EPI 2021: >90 ML/MIN/1.73M*2
EOSINOPHIL # BLD MANUAL: 0 X10*3/UL (ref 0–0.7)
EOSINOPHIL NFR BLD MANUAL: 0 %
ERYTHROCYTE [DISTWIDTH] IN BLOOD BY AUTOMATED COUNT: 14.2 % (ref 11.5–14.5)
GLUCOSE SERPL-MCNC: 98 MG/DL (ref 74–99)
HCT VFR BLD AUTO: 38.2 % (ref 41–52)
HGB BLD-MCNC: 12.4 G/DL (ref 13.5–17.5)
IMM GRANULOCYTES # BLD AUTO: 0.87 X10*3/UL (ref 0–0.7)
IMM GRANULOCYTES NFR BLD AUTO: 0.9 % (ref 0–0.9)
LDH SERPL L TO P-CCNC: 215 U/L (ref 84–246)
LYMPHOCYTES # BLD MANUAL: 73.42 X10*3/UL (ref 1.2–4.8)
LYMPHOCYTES NFR BLD MANUAL: 76 %
MAGNESIUM SERPL-MCNC: 1.98 MG/DL (ref 1.6–2.4)
MCH RBC QN AUTO: 29.3 PG (ref 26–34)
MCHC RBC AUTO-ENTMCNC: 32.5 G/DL (ref 32–36)
MCV RBC AUTO: 90 FL (ref 80–100)
MONOCYTES # BLD MANUAL: 4.83 X10*3/UL (ref 0.1–1)
MONOCYTES NFR BLD MANUAL: 5 %
NEUTS SEG # BLD MANUAL: 10.63 X10*3/UL (ref 1.2–7)
NEUTS SEG NFR BLD MANUAL: 11 %
NRBC BLD-RTO: ABNORMAL /100{WBCS}
PLATELET # BLD AUTO: 271 X10*3/UL (ref 150–450)
POTASSIUM SERPL-SCNC: 4.4 MMOL/L (ref 3.5–5.3)
PROT SERPL-MCNC: 6.7 G/DL (ref 6.4–8.2)
RBC # BLD AUTO: 4.23 X10*6/UL (ref 4.5–5.9)
RBC MORPH BLD: ABNORMAL
SODIUM SERPL-SCNC: 139 MMOL/L (ref 136–145)
TOTAL CELLS COUNTED BLD: 100
URATE SERPL-MCNC: 6.4 MG/DL (ref 4–7.5)
VARIANT LYMPHS # BLD MANUAL: 7.73 X10*3/UL (ref 0–0.5)
VARIANT LYMPHS NFR BLD: 8 %
WBC # BLD AUTO: 96.6 X10*3/UL (ref 4.4–11.3)

## 2024-11-13 PROCEDURE — 84550 ASSAY OF BLOOD/URIC ACID: CPT

## 2024-11-13 PROCEDURE — 36415 COLL VENOUS BLD VENIPUNCTURE: CPT

## 2024-11-13 PROCEDURE — 99214 OFFICE O/P EST MOD 30 MIN: CPT | Performed by: INTERNAL MEDICINE

## 2024-11-13 PROCEDURE — 3079F DIAST BP 80-89 MM HG: CPT | Performed by: INTERNAL MEDICINE

## 2024-11-13 PROCEDURE — 3075F SYST BP GE 130 - 139MM HG: CPT | Performed by: INTERNAL MEDICINE

## 2024-11-13 PROCEDURE — 80053 COMPREHEN METABOLIC PANEL: CPT

## 2024-11-13 PROCEDURE — 85007 BL SMEAR W/DIFF WBC COUNT: CPT

## 2024-11-13 PROCEDURE — 83615 LACTATE (LD) (LDH) ENZYME: CPT

## 2024-11-13 PROCEDURE — 85027 COMPLETE CBC AUTOMATED: CPT

## 2024-11-13 PROCEDURE — 83735 ASSAY OF MAGNESIUM: CPT

## 2024-11-13 ASSESSMENT — PAIN SCALES - GENERAL: PAINLEVEL_OUTOF10: 0-NO PAIN

## 2024-11-13 NOTE — PROGRESS NOTES
Patient ID: Brandon Snider is a 49 y.o. male.  Referring Physician: Gage Zavala MD  [id number]  [place] [address]  Primary Care Provider: Bernard Hernandez MD  Visit Type: Follow Up      Subjective    HPI How are my blood counts?    Review of Systems   Constitutional: Negative.    HENT:  Negative.     Eyes: Negative.    Respiratory: Negative.     Cardiovascular: Negative.    Gastrointestinal: Negative.    Endocrine: Negative.    Genitourinary: Negative.     Musculoskeletal: Negative.    Skin: Negative.    Neurological: Negative.    Hematological: Negative.    Psychiatric/Behavioral: Negative.          Objective   BSA: 2.38 meters squared  /85 (BP Location: Left arm)   Pulse 77   Temp 36.2 °C (97.2 °F) (Temporal)   Resp 16   Wt 115 kg (252 lb 10.4 oz)   SpO2 92%   BMI 36.25 kg/m²      has a past medical history of Encounter for general adult medical examination without abnormal findings (11/05/2019), Hypertension, Obesity, unspecified (11/05/2019), Other specified abnormal findings of blood chemistry (04/29/2021), Other specified counseling (12/14/2020), Personal history of diseases of the blood and blood-forming organs and certain disorders involving the immune mechanism (04/29/2021), and Sinobronchitis (01/27/2023).   has no past surgical history on file.  Family History   Problem Relation Name Age of Onset    Esophageal cancer Mother      Hypertension Mother      Other (elevated liver enzymes) Father      Other (cardiac disorder) Father      Prostate cancer Father      Prostate cancer Paternal Grandfather      Prostate cancer Other grandfather      Oncology History    No history exists.       Brandon Snider  reports that he has never smoked. He has never used smokeless tobacco.  He  reports current alcohol use.  He  reports no history of drug use.    Physical Exam  Vitals reviewed.   Constitutional:       Appearance: Normal appearance.   HENT:      Head: Normocephalic.       Mouth/Throat:      Mouth: Mucous membranes are moist.   Eyes:      Extraocular Movements: Extraocular movements intact.      Pupils: Pupils are equal, round, and reactive to light.   Cardiovascular:      Rate and Rhythm: Normal rate and regular rhythm.      Pulses: Normal pulses.      Heart sounds: Normal heart sounds.   Pulmonary:      Effort: Pulmonary effort is normal.      Breath sounds: Normal breath sounds.   Abdominal:      General: Bowel sounds are normal.      Palpations: Abdomen is soft.   Musculoskeletal:         General: Normal range of motion.      Cervical back: Normal range of motion and neck supple.   Skin:     General: Skin is warm.   Neurological:      General: No focal deficit present.      Mental Status: He is alert and oriented to person, place, and time.   Psychiatric:         Mood and Affect: Mood normal.         Behavior: Behavior normal.         WBC   Date/Time Value Ref Range Status   11/13/2024 08:52 AM 96.6 (HH) 4.4 - 11.3 x10*3/uL Final   07/10/2024 03:26 PM 70.9 (HH) 4.4 - 11.3 x10*3/uL Final   06/20/2024 09:05 AM 65.5 (HH) 4.4 - 11.3 x10*3/uL Final   06/20/2024 09:05 AM 65.5 (HH) 4.4 - 11.3 x10*3/uL Final     nRBC   Date Value Ref Range Status   11/13/2024   Final     Comment:     Not Measured   07/10/2024   Final     Comment:     Not Measured   06/20/2024 0.0 0.0 - 0.0 /100 WBCs Final   06/20/2024 0.0 0.0 - 0.0 /100 WBCs Final     RBC   Date Value Ref Range Status   11/13/2024 4.23 (L) 4.50 - 5.90 x10*6/uL Final   07/10/2024 4.34 (L) 4.50 - 5.90 x10*6/uL Final   06/20/2024 4.56 4.50 - 5.90 x10*6/uL Final   06/20/2024 4.56 4.50 - 5.90 x10*6/uL Final     Hemoglobin   Date Value Ref Range Status   11/13/2024 12.4 (L) 13.5 - 17.5 g/dL Final   07/10/2024 12.5 (L) 13.5 - 17.5 g/dL Final   06/20/2024 13.0 (L) 13.5 - 17.5 g/dL Final   06/20/2024 13.0 (L) 13.5 - 17.5 g/dL Final     Hematocrit   Date Value Ref Range Status   11/13/2024 38.2 (L) 41.0 - 52.0 % Final   07/10/2024 39.5 (L) 41.0 -  "52.0 % Final   06/20/2024 41.2 41.0 - 52.0 % Final   06/20/2024 41.2 41.0 - 52.0 % Final     MCV   Date/Time Value Ref Range Status   11/13/2024 08:52 AM 90 80 - 100 fL Final   07/10/2024 03:26 PM 91 80 - 100 fL Final   06/20/2024 09:05 AM 90 80 - 100 fL Final   06/20/2024 09:05 AM 90 80 - 100 fL Final     MCH   Date/Time Value Ref Range Status   11/13/2024 08:52 AM 29.3 26.0 - 34.0 pg Final   07/10/2024 03:26 PM 28.8 26.0 - 34.0 pg Final   06/20/2024 09:05 AM 28.5 26.0 - 34.0 pg Final   06/20/2024 09:05 AM 28.5 26.0 - 34.0 pg Final     MCHC   Date/Time Value Ref Range Status   11/13/2024 08:52 AM 32.5 32.0 - 36.0 g/dL Final   07/10/2024 03:26 PM 31.6 (L) 32.0 - 36.0 g/dL Final   06/20/2024 09:05 AM 31.6 (L) 32.0 - 36.0 g/dL Final   06/20/2024 09:05 AM 31.6 (L) 32.0 - 36.0 g/dL Final     RDW   Date/Time Value Ref Range Status   11/13/2024 08:52 AM 14.2 11.5 - 14.5 % Final   07/10/2024 03:26 PM 13.8 11.5 - 14.5 % Final   06/20/2024 09:05 AM 13.9 11.5 - 14.5 % Final   06/20/2024 09:05 AM 13.9 11.5 - 14.5 % Final     Platelets   Date/Time Value Ref Range Status   11/13/2024 08:52  150 - 450 x10*3/uL Final   07/10/2024 03:26  150 - 450 x10*3/uL Final   06/20/2024 09:05  150 - 450 x10*3/uL Final   06/20/2024 09:05  150 - 450 x10*3/uL Final     No results found for: \"MPV\"  Neutrophils %   Date/Time Value Ref Range Status   11/28/2023 02:23 PM 18.2 40.0 - 80.0 % Final   07/12/2023 10:24 AM 16.2 40.0 - 80.0 % Final   06/06/2022 08:56 AM 18.8 40.0 - 80.0 % Final   12/13/2021 09:10 AM 21.2 40.0 - 80.0 % Final     Immature Granulocytes %, Automated   Date/Time Value Ref Range Status   11/13/2024 08:52 AM 0.9 0.0 - 0.9 % Final     Comment:     Immature Granulocyte Count (IG) includes promyelocytes, myelocytes and metamyelocytes but does not include bands. Percent differential counts (%) should be interpreted in the context of the absolute cell counts (cells/UL).   07/10/2024 03:26 PM 1.0 (H) 0.0 - 0.9 % " Final     Comment:     Immature Granulocyte Count (IG) includes promyelocytes, myelocytes and metamyelocytes but does not include bands. Percent differential counts (%) should be interpreted in the context of the absolute cell counts (cells/UL).   06/20/2024 09:05 AM 0.9 0.0 - 0.9 % Final     Comment:     Immature Granulocyte Count (IG) includes promyelocytes, myelocytes and metamyelocytes but does not include bands. Percent differential counts (%) should be interpreted in the context of the absolute cell counts (cells/UL).     Lymphocytes %, Manual   Date/Time Value Ref Range Status   11/13/2024 08:52 AM 76.0 13.0 - 44.0 % Final   07/10/2024 03:26 PM 88.0 13.0 - 44.0 % Final   06/20/2024 09:05 AM 91.0 13.0 - 44.0 % Final     Monocytes %, Manual   Date/Time Value Ref Range Status   11/13/2024 08:52 AM 5.0 2.0 - 10.0 % Final   07/10/2024 03:26 PM 2.0 2.0 - 10.0 % Final   06/20/2024 09:05 AM 2.0 2.0 - 10.0 % Final     Eosinophils %, Manual   Date/Time Value Ref Range Status   11/13/2024 08:52 AM 0.0 0.0 - 6.0 % Final   07/10/2024 03:26 PM 1.0 0.0 - 6.0 % Final   06/20/2024 09:05 AM 1.0 0.0 - 6.0 % Final     Basophils %, Manual   Date/Time Value Ref Range Status   11/13/2024 08:52 AM 0.0 0.0 - 2.0 % Final   07/10/2024 03:26 PM 0.0 0.0 - 2.0 % Final   06/20/2024 09:05 AM 0.0 0.0 - 2.0 % Final     Neutrophils Absolute   Date/Time Value Ref Range Status   11/28/2023 02:23 PM 6.13 1.20 - 7.70 x10*3/uL Final     Comment:     Percent differential counts (%) should be interpreted in the context of the absolute cell counts (cells/uL).   07/12/2023 10:24 AM 5.27 1.20 - 7.70 x10E9/L Final   06/06/2022 08:56 AM 4.85 1.20 - 7.70 x10E9/L Final   12/13/2021 09:10 AM 5.21 1.20 - 7.70 x10E9/L Final     Immature Granulocytes Absolute, Automated   Date/Time Value Ref Range Status   11/13/2024 08:52 AM 0.87 (H) 0.00 - 0.70 x10*3/uL Final   07/10/2024 03:26 PM 0.68 0.00 - 0.70 x10*3/uL Final   06/20/2024 09:05 AM 0.60 0.00 - 0.70 x10*3/uL  "Final     Lymphocytes Absolute   Date/Time Value Ref Range Status   11/28/2023 02:23 PM 23.89 (H) 1.20 - 4.80 x10*3/uL Final   07/12/2023 10:24 AM 24.47 (H) 1.20 - 4.80 x10E9/L Final   06/06/2022 08:56 AM 18.19 (H) 1.20 - 4.80 x10E9/L Final   12/13/2021 09:10 AM 16.86 (H) 1.20 - 4.80 x10E9/L Final     Monocytes Absolute   Date/Time Value Ref Range Status   11/28/2023 02:23 PM 2.48 (H) 0.10 - 1.00 x10*3/uL Final   07/12/2023 10:24 AM 2.18 (H) 0.10 - 1.00 x10E9/L Final   06/06/2022 08:56 AM 1.86 (H) 0.10 - 1.00 x10E9/L Final   12/13/2021 09:10 AM 1.91 (H) 0.10 - 1.00 x10E9/L Final     Eosinophils Absolute, Manual   Date/Time Value Ref Range Status   11/13/2024 08:52 AM 0.00 0.00 - 0.70 x10*3/uL Final   07/10/2024 03:26 PM 0.71 (H) 0.00 - 0.70 x10*3/uL Final   06/20/2024 09:05 AM 0.66 0.00 - 0.70 x10*3/uL Final     Basophils Absolute, Manual   Date/Time Value Ref Range Status   11/13/2024 08:52 AM 0.00 0.00 - 0.10 x10*3/uL Final   07/10/2024 03:26 PM 0.00 0.00 - 0.10 x10*3/uL Final   06/20/2024 09:05 AM 0.00 0.00 - 0.10 x10*3/uL Final       No components found for: \"PT\"  No results found for: \"APTT\"  Medication Documentation Review Audit       Reviewed by Uyen Hull MA (Medical Assistant) on 11/13/24 at 0933      Medication Order Taking? Sig Documenting Provider Last Dose Status   amLODIPine (Norvasc) 10 mg tablet [id number] Yes Take 1 tablet (10 mg) by mouth once daily. Bernard Hernandez MD Taking Active   cetirizine HCl/pseudoephedrine (ZYRTEC-D ORAL) [id number] No Take by mouth.   Patient not taking: Reported on 11/13/2024    Historical Provider, MD Taking Active   choline fenofibrate (Trilipix) 135 mg DR capsule [id number] Yes Take 1 capsule (135 mg) by mouth once daily. Bernard Hernandez MD Taking Active   escitalopram (Lexapro) 10 mg tablet [id number] Yes Take 1 tablet (10 mg) by mouth once daily. Bernard Hernandez MD Not Taking Active   fluticasone (Flonase) 50 mcg/actuation nasal spray [id number] Yes Administer 1 spray " into each nostril once daily. Shake gently. Before first use, prime pump. After use, clean tip and replace cap. Historical Provider, MD Taking Active   lisinopril 40 mg tablet [id number] Yes Take 1.5 tablets (60 mg) by mouth once daily. Bernard Hernandez MD Taking Active   triamcinolone (Kenalog) 0.1 % cream [id number] No Apply topically 2 times a day.   Patient not taking: Reported on 11/13/2024    Bernard Hernandez MD Taking Active                   Assessment/Plan    1) CLL/SLL  - here for interval followup  -today's labs included CBC, COMP, mag, uric acid, LDH  -results reviewed--wbc 96.6, hgb 12.4, plt 271,000, ANC 10.630, abs lymph 73.420, creatinine 0.71, calcium 9.9, AST 21, ALT 33, uric acid 6.4, , mag 1.98  -he continues without any symptoms secondary to CLL  -we reviewed again the indications for initiation of therapy--bothersome B symptoms, development of symptomatic anemia/thrombocytopenia, symptomatic bulky lymphadenopathy, symptomatic hepatosplenomegaly  -however, 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, 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, for which CLL can progress faster, and this would explain his accelerating lymphocytosis  -I have therefore advised initiation of BTKi  -he is in agreement to proceed  -benefits, risks, potential morbidity related to zanubrutinib were reviewed with Brandon and he signed informed consent to proceed  -he will start zanubrutinib (Brukinsa) 160 mg PO BID  -he will then return 2 weeks after initiation of brukinsa  -he is aware that the circulating lymphocyte count will rise even more after start of therapy--this is how the drug works, and is not at all an indication that his CLL is getting worse  -will also monitor tumor lysis labs    2. HTN  -on lisinopril  -on amlodipine     3.  HLD  -on trilipix     Problem List Items Addressed This Visit             ICD-10-CM    CLL (chronic lymphocytic leukemia) (Multi) C91.10    Relevant Orders    Clinic Appointment Request  Follow Up; GAGE ZAVALA; [id number]    CBC and Auto Differential    Comprehensive metabolic panel    Uric acid (Completed)    Lactate dehydrogenase (Completed)    Magnesium (Completed)    Lactate dehydrogenase            Gage Zavala MD

## 2024-11-18 ENCOUNTER — SPECIALTY PHARMACY (OUTPATIENT)
Dept: PHARMACY | Facility: CLINIC | Age: 49
End: 2024-11-18

## 2024-11-18 PROCEDURE — RXMED WILLOW AMBULATORY MEDICATION CHARGE

## 2024-11-19 ENCOUNTER — PHARMACY VISIT (OUTPATIENT)
Dept: PHARMACY | Facility: CLINIC | Age: 49
End: 2024-11-19
Payer: COMMERCIAL

## 2024-11-20 ASSESSMENT — ENCOUNTER SYMPTOMS
HEMATOLOGIC/LYMPHATIC NEGATIVE: 1
PSYCHIATRIC NEGATIVE: 1
EYES NEGATIVE: 1
MUSCULOSKELETAL NEGATIVE: 1
ENDOCRINE NEGATIVE: 1
CONSTITUTIONAL NEGATIVE: 1
CARDIOVASCULAR NEGATIVE: 1
NEUROLOGICAL NEGATIVE: 1
RESPIRATORY NEGATIVE: 1
GASTROINTESTINAL NEGATIVE: 1

## 2024-12-03 ENCOUNTER — LAB (OUTPATIENT)
Dept: LAB | Facility: CLINIC | Age: 49
End: 2024-12-03
Payer: COMMERCIAL

## 2024-12-03 DIAGNOSIS — C91.10 CLL (CHRONIC LYMPHOCYTIC LEUKEMIA) (MULTI): ICD-10-CM

## 2024-12-03 LAB
ALBUMIN SERPL BCP-MCNC: 4.9 G/DL (ref 3.4–5)
ALP SERPL-CCNC: 48 U/L (ref 33–120)
ALT SERPL W P-5'-P-CCNC: 21 U/L (ref 10–52)
ANION GAP SERPL CALC-SCNC: 10 MMOL/L (ref 10–20)
AST SERPL W P-5'-P-CCNC: 14 U/L (ref 9–39)
BASOPHILS # BLD AUTO: 0.07 X10*3/UL (ref 0–0.1)
BASOPHILS NFR BLD AUTO: 0.2 %
BILIRUB SERPL-MCNC: 0.6 MG/DL (ref 0–1.2)
BUN SERPL-MCNC: 11 MG/DL (ref 6–23)
CALCIUM SERPL-MCNC: 9.8 MG/DL (ref 8.6–10.3)
CHLORIDE SERPL-SCNC: 102 MMOL/L (ref 98–107)
CO2 SERPL-SCNC: 30 MMOL/L (ref 21–32)
CREAT SERPL-MCNC: 0.72 MG/DL (ref 0.5–1.3)
EGFRCR SERPLBLD CKD-EPI 2021: >90 ML/MIN/1.73M*2
EOSINOPHIL # BLD AUTO: 0.26 X10*3/UL (ref 0–0.7)
EOSINOPHIL NFR BLD AUTO: 0.7 %
ERYTHROCYTE [DISTWIDTH] IN BLOOD BY AUTOMATED COUNT: 13.7 % (ref 11.5–14.5)
GLUCOSE SERPL-MCNC: 118 MG/DL (ref 74–99)
HCT VFR BLD AUTO: 39.1 % (ref 41–52)
HGB BLD-MCNC: 12.3 G/DL (ref 13.5–17.5)
IMM GRANULOCYTES # BLD AUTO: 0.11 X10*3/UL (ref 0–0.7)
IMM GRANULOCYTES NFR BLD AUTO: 0.3 % (ref 0–0.9)
LDH SERPL L TO P-CCNC: 114 U/L (ref 84–246)
LYMPHOCYTES # BLD AUTO: 32.07 X10*3/UL (ref 1.2–4.8)
LYMPHOCYTES NFR BLD AUTO: 83.2 %
MAGNESIUM SERPL-MCNC: 1.8 MG/DL (ref 1.6–2.4)
MCH RBC QN AUTO: 28.7 PG (ref 26–34)
MCHC RBC AUTO-ENTMCNC: 31.5 G/DL (ref 32–36)
MCV RBC AUTO: 91 FL (ref 80–100)
MONOCYTES # BLD AUTO: 0.8 X10*3/UL (ref 0.1–1)
MONOCYTES NFR BLD AUTO: 2.1 %
NEUTROPHILS # BLD AUTO: 5.22 X10*3/UL (ref 1.2–7.7)
NEUTROPHILS NFR BLD AUTO: 13.5 %
NRBC BLD-RTO: ABNORMAL /100{WBCS}
PLATELET # BLD AUTO: 320 X10*3/UL (ref 150–450)
POTASSIUM SERPL-SCNC: 4 MMOL/L (ref 3.5–5.3)
PROT SERPL-MCNC: 6.9 G/DL (ref 6.4–8.2)
RBC # BLD AUTO: 4.28 X10*6/UL (ref 4.5–5.9)
RBC MORPH BLD: NORMAL
SODIUM SERPL-SCNC: 138 MMOL/L (ref 136–145)
URATE SERPL-MCNC: 5.1 MG/DL (ref 4–7.5)
WBC # BLD AUTO: 38.5 X10*3/UL (ref 4.4–11.3)

## 2024-12-03 PROCEDURE — 84550 ASSAY OF BLOOD/URIC ACID: CPT

## 2024-12-03 PROCEDURE — 36415 COLL VENOUS BLD VENIPUNCTURE: CPT

## 2024-12-03 PROCEDURE — 83615 LACTATE (LD) (LDH) ENZYME: CPT

## 2024-12-03 PROCEDURE — 84075 ASSAY ALKALINE PHOSPHATASE: CPT

## 2024-12-03 PROCEDURE — 85025 COMPLETE CBC W/AUTO DIFF WBC: CPT

## 2024-12-03 PROCEDURE — 83735 ASSAY OF MAGNESIUM: CPT

## 2024-12-04 ENCOUNTER — SPECIALTY PHARMACY (OUTPATIENT)
Dept: HEMATOLOGY/ONCOLOGY | Facility: CLINIC | Age: 49
End: 2024-12-04

## 2024-12-04 ENCOUNTER — OFFICE VISIT (OUTPATIENT)
Dept: HEMATOLOGY/ONCOLOGY | Facility: CLINIC | Age: 49
End: 2024-12-04
Payer: COMMERCIAL

## 2024-12-04 VITALS
SYSTOLIC BLOOD PRESSURE: 149 MMHG | BODY MASS INDEX: 36.16 KG/M2 | RESPIRATION RATE: 18 BRPM | DIASTOLIC BLOOD PRESSURE: 86 MMHG | OXYGEN SATURATION: 94 % | HEART RATE: 81 BPM | TEMPERATURE: 97 F | WEIGHT: 251.99 LBS

## 2024-12-04 DIAGNOSIS — C91.10 CLL (CHRONIC LYMPHOCYTIC LEUKEMIA) (MULTI): ICD-10-CM

## 2024-12-04 DIAGNOSIS — I10 PRIMARY HYPERTENSION: ICD-10-CM

## 2024-12-04 DIAGNOSIS — E78.00 HYPERCHOLESTEROLEMIA: ICD-10-CM

## 2024-12-04 PROCEDURE — 99214 OFFICE O/P EST MOD 30 MIN: CPT | Performed by: INTERNAL MEDICINE

## 2024-12-04 PROCEDURE — 3077F SYST BP >= 140 MM HG: CPT | Performed by: INTERNAL MEDICINE

## 2024-12-04 PROCEDURE — 3079F DIAST BP 80-89 MM HG: CPT | Performed by: INTERNAL MEDICINE

## 2024-12-04 ASSESSMENT — PAIN SCALES - GENERAL: PAINLEVEL_OUTOF10: 0-NO PAIN

## 2024-12-04 NOTE — PROGRESS NOTES
Patient ID: Brandon Snider is a 49 y.o. male.  Referring Physician: Gage Zavala MD  [id number]  [place] [address]  Primary Care Provider: Bernard Hernandez MD  Visit Type: Follow Up      Subjective    HPI How are my blood counts?    Review of Systems   Constitutional: Negative.    HENT:  Negative.     Eyes: Negative.    Respiratory: Negative.     Gastrointestinal: Negative.    Endocrine: Negative.    Genitourinary: Negative.     Musculoskeletal: Negative.    Skin: Negative.    Neurological: Negative.    Hematological: Negative.    Psychiatric/Behavioral: Negative.          Objective   BSA: 2.37 meters squared  /86 (BP Location: Right arm, Patient Position: Sitting)   Pulse 81   Temp 36.1 °C (97 °F) (Temporal)   Resp 18   Wt 114 kg (251 lb 15.8 oz)   SpO2 94%   BMI 36.16 kg/m²      has a past medical history of Encounter for general adult medical examination without abnormal findings (11/05/2019), Hypertension, Obesity, unspecified (11/05/2019), Other specified abnormal findings of blood chemistry (04/29/2021), Other specified counseling (12/14/2020), Personal history of diseases of the blood and blood-forming organs and certain disorders involving the immune mechanism (04/29/2021), and Sinobronchitis (01/27/2023).   has no past surgical history on file.  Family History   Problem Relation Name Age of Onset    Esophageal cancer Mother      Hypertension Mother      Other (elevated liver enzymes) Father      Other (cardiac disorder) Father      Prostate cancer Father      Prostate cancer Paternal Grandfather      Prostate cancer Other grandfather      Oncology History   CLL (chronic lymphocytic leukemia) (Multi)   1/27/2023 Initial Diagnosis    CLL (chronic lymphocytic leukemia) (Multi)     11/22/2024 -  Chemotherapy    Zanubrutinib, 84 Day Cycles         Brandon Snider  reports that he has never smoked. He has never used smokeless tobacco.  He  reports current alcohol use.  He   reports no history of drug use.    Physical Exam  Vitals reviewed.   Constitutional:       Appearance: Normal appearance.   HENT:      Head: Normocephalic.      Mouth/Throat:      Mouth: Mucous membranes are moist.   Eyes:      Extraocular Movements: Extraocular movements intact.      Pupils: Pupils are equal, round, and reactive to light.   Cardiovascular:      Rate and Rhythm: Normal rate and regular rhythm.      Pulses: Normal pulses.      Heart sounds: Normal heart sounds.   Pulmonary:      Effort: Pulmonary effort is normal.      Breath sounds: Normal breath sounds.   Abdominal:      General: Bowel sounds are normal.      Palpations: Abdomen is soft.   Musculoskeletal:         General: Normal range of motion.      Cervical back: Normal range of motion and neck supple.   Skin:     General: Skin is warm.   Neurological:      General: No focal deficit present.      Mental Status: He is alert and oriented to person, place, and time.   Psychiatric:         Mood and Affect: Mood normal.         Behavior: Behavior normal.         WBC   Date/Time Value Ref Range Status   12/03/2024 09:21 AM 38.5 (H) 4.4 - 11.3 x10*3/uL Final   11/13/2024 08:52 AM 96.6 (HH) 4.4 - 11.3 x10*3/uL Final   07/10/2024 03:26 PM 70.9 (HH) 4.4 - 11.3 x10*3/uL Final     nRBC   Date Value Ref Range Status   12/03/2024   Final     Comment:     Not Measured   11/13/2024   Final     Comment:     Not Measured   07/10/2024   Final     Comment:     Not Measured     RBC   Date Value Ref Range Status   12/03/2024 4.28 (L) 4.50 - 5.90 x10*6/uL Final   11/13/2024 4.23 (L) 4.50 - 5.90 x10*6/uL Final   07/10/2024 4.34 (L) 4.50 - 5.90 x10*6/uL Final     Hemoglobin   Date Value Ref Range Status   12/03/2024 12.3 (L) 13.5 - 17.5 g/dL Final   11/13/2024 12.4 (L) 13.5 - 17.5 g/dL Final   07/10/2024 12.5 (L) 13.5 - 17.5 g/dL Final     Hematocrit   Date Value Ref Range Status   12/03/2024 39.1 (L) 41.0 - 52.0 % Final   11/13/2024 38.2 (L) 41.0 - 52.0 % Final  "  07/10/2024 39.5 (L) 41.0 - 52.0 % Final     MCV   Date/Time Value Ref Range Status   12/03/2024 09:21 AM 91 80 - 100 fL Final   11/13/2024 08:52 AM 90 80 - 100 fL Final   07/10/2024 03:26 PM 91 80 - 100 fL Final     MCH   Date/Time Value Ref Range Status   12/03/2024 09:21 AM 28.7 26.0 - 34.0 pg Final   11/13/2024 08:52 AM 29.3 26.0 - 34.0 pg Final   07/10/2024 03:26 PM 28.8 26.0 - 34.0 pg Final     MCHC   Date/Time Value Ref Range Status   12/03/2024 09:21 AM 31.5 (L) 32.0 - 36.0 g/dL Final   11/13/2024 08:52 AM 32.5 32.0 - 36.0 g/dL Final   07/10/2024 03:26 PM 31.6 (L) 32.0 - 36.0 g/dL Final     RDW   Date/Time Value Ref Range Status   12/03/2024 09:21 AM 13.7 11.5 - 14.5 % Final   11/13/2024 08:52 AM 14.2 11.5 - 14.5 % Final   07/10/2024 03:26 PM 13.8 11.5 - 14.5 % Final     Platelets   Date/Time Value Ref Range Status   12/03/2024 09:21  150 - 450 x10*3/uL Final   11/13/2024 08:52  150 - 450 x10*3/uL Final   07/10/2024 03:26  150 - 450 x10*3/uL Final     No results found for: \"MPV\"  Neutrophils %   Date/Time Value Ref Range Status   12/03/2024 09:21 AM 13.5 40.0 - 80.0 % Final   11/28/2023 02:23 PM 18.2 40.0 - 80.0 % Final   07/12/2023 10:24 AM 16.2 40.0 - 80.0 % Final   06/06/2022 08:56 AM 18.8 40.0 - 80.0 % Final   12/13/2021 09:10 AM 21.2 40.0 - 80.0 % Final     Immature Granulocytes %, Automated   Date/Time Value Ref Range Status   12/03/2024 09:21 AM 0.3 0.0 - 0.9 % Final     Comment:     Immature Granulocyte Count (IG) includes promyelocytes, myelocytes and metamyelocytes but does not include bands. Percent differential counts (%) should be interpreted in the context of the absolute cell counts (cells/UL).   11/13/2024 08:52 AM 0.9 0.0 - 0.9 % Final     Comment:     Immature Granulocyte Count (IG) includes promyelocytes, myelocytes and metamyelocytes but does not include bands. Percent differential counts (%) should be interpreted in the context of the absolute cell counts (cells/UL). "   07/10/2024 03:26 PM 1.0 (H) 0.0 - 0.9 % Final     Comment:     Immature Granulocyte Count (IG) includes promyelocytes, myelocytes and metamyelocytes but does not include bands. Percent differential counts (%) should be interpreted in the context of the absolute cell counts (cells/UL).     Lymphocytes %, Manual   Date/Time Value Ref Range Status   11/13/2024 08:52 AM 76.0 13.0 - 44.0 % Final   07/10/2024 03:26 PM 88.0 13.0 - 44.0 % Final   06/20/2024 09:05 AM 91.0 13.0 - 44.0 % Final     Lymphocytes %   Date/Time Value Ref Range Status   12/03/2024 09:21 AM 83.2 13.0 - 44.0 % Final     Monocytes %, Manual   Date/Time Value Ref Range Status   11/13/2024 08:52 AM 5.0 2.0 - 10.0 % Final   07/10/2024 03:26 PM 2.0 2.0 - 10.0 % Final   06/20/2024 09:05 AM 2.0 2.0 - 10.0 % Final     Monocytes %   Date/Time Value Ref Range Status   12/03/2024 09:21 AM 2.1 2.0 - 10.0 % Final     Eosinophils %, Manual   Date/Time Value Ref Range Status   11/13/2024 08:52 AM 0.0 0.0 - 6.0 % Final   07/10/2024 03:26 PM 1.0 0.0 - 6.0 % Final   06/20/2024 09:05 AM 1.0 0.0 - 6.0 % Final     Eosinophils %   Date/Time Value Ref Range Status   12/03/2024 09:21 AM 0.7 0.0 - 6.0 % Final     Basophils %, Manual   Date/Time Value Ref Range Status   11/13/2024 08:52 AM 0.0 0.0 - 2.0 % Final   07/10/2024 03:26 PM 0.0 0.0 - 2.0 % Final   06/20/2024 09:05 AM 0.0 0.0 - 2.0 % Final     Basophils %   Date/Time Value Ref Range Status   12/03/2024 09:21 AM 0.2 0.0 - 2.0 % Final     Neutrophils Absolute   Date/Time Value Ref Range Status   12/03/2024 09:21 AM 5.22 1.20 - 7.70 x10*3/uL Final     Comment:     Percent differential counts (%) should be interpreted in the context of the absolute cell counts (cells/uL).   11/28/2023 02:23 PM 6.13 1.20 - 7.70 x10*3/uL Final     Comment:     Percent differential counts (%) should be interpreted in the context of the absolute cell counts (cells/uL).   07/12/2023 10:24 AM 5.27 1.20 - 7.70 x10E9/L Final   06/06/2022 08:56  "AM 4.85 1.20 - 7.70 x10E9/L Final   12/13/2021 09:10 AM 5.21 1.20 - 7.70 x10E9/L Final     Immature Granulocytes Absolute, Automated   Date/Time Value Ref Range Status   12/03/2024 09:21 AM 0.11 0.00 - 0.70 x10*3/uL Final   11/13/2024 08:52 AM 0.87 (H) 0.00 - 0.70 x10*3/uL Final   07/10/2024 03:26 PM 0.68 0.00 - 0.70 x10*3/uL Final     Lymphocytes Absolute   Date/Time Value Ref Range Status   12/03/2024 09:21 AM 32.07 (H) 1.20 - 4.80 x10*3/uL Final   11/28/2023 02:23 PM 23.89 (H) 1.20 - 4.80 x10*3/uL Final   07/12/2023 10:24 AM 24.47 (H) 1.20 - 4.80 x10E9/L Final   06/06/2022 08:56 AM 18.19 (H) 1.20 - 4.80 x10E9/L Final   12/13/2021 09:10 AM 16.86 (H) 1.20 - 4.80 x10E9/L Final     Monocytes Absolute   Date/Time Value Ref Range Status   12/03/2024 09:21 AM 0.80 0.10 - 1.00 x10*3/uL Final   11/28/2023 02:23 PM 2.48 (H) 0.10 - 1.00 x10*3/uL Final   07/12/2023 10:24 AM 2.18 (H) 0.10 - 1.00 x10E9/L Final   06/06/2022 08:56 AM 1.86 (H) 0.10 - 1.00 x10E9/L Final   12/13/2021 09:10 AM 1.91 (H) 0.10 - 1.00 x10E9/L Final     Eosinophils Absolute   Date/Time Value Ref Range Status   12/03/2024 09:21 AM 0.26 0.00 - 0.70 x10*3/uL Final     Eosinophils Absolute, Manual   Date/Time Value Ref Range Status   11/13/2024 08:52 AM 0.00 0.00 - 0.70 x10*3/uL Final   07/10/2024 03:26 PM 0.71 (H) 0.00 - 0.70 x10*3/uL Final   06/20/2024 09:05 AM 0.66 0.00 - 0.70 x10*3/uL Final     Basophils Absolute   Date/Time Value Ref Range Status   12/03/2024 09:21 AM 0.07 0.00 - 0.10 x10*3/uL Final     Comment:     Automated WBC differential has been confirmed by manual smear.     Basophils Absolute, Manual   Date/Time Value Ref Range Status   11/13/2024 08:52 AM 0.00 0.00 - 0.10 x10*3/uL Final   07/10/2024 03:26 PM 0.00 0.00 - 0.10 x10*3/uL Final   06/20/2024 09:05 AM 0.00 0.00 - 0.10 x10*3/uL Final       No components found for: \"PT\"  No results found for: \"APTT\"  Medication Documentation Review Audit       Reviewed by Kristin Franco MA (Medical " Assistant) on 12/04/24 at 1439      Medication Order Taking? Sig Documenting Provider Last Dose Status   amLODIPine (Norvasc) 10 mg tablet [id number] Yes Take 1 tablet (10 mg) by mouth once daily. Bernard Hernandez MD Taking Active   cetirizine HCl/pseudoephedrine (ZYRTEC-D ORAL) [id number] No Take by mouth.   Patient not taking: Reported on 12/4/2024    Historical Provider, MD Taking Active   choline fenofibrate (Trilipix) 135 mg DR capsule [id number] Yes Take 1 capsule (135 mg) by mouth once daily. Bernard Hernandez MD Taking Active   escitalopram (Lexapro) 10 mg tablet [id number] Yes Take 1 tablet (10 mg) by mouth once daily. Bernard Hernandez MD Not Taking Active   fluticasone (Flonase) 50 mcg/actuation nasal spray [id number] Yes Administer 1 spray into each nostril once daily. Shake gently. Before first use, prime pump. After use, clean tip and replace cap. Historical Provider, MD Taking Active   lisinopril 40 mg tablet [id number] Yes Take 1.5 tablets (60 mg) by mouth once daily. Bernard Hernandez MD Taking Active   triamcinolone (Kenalog) 0.1 % cream [id number] Yes Apply topically 2 times a day. Bernard Hernandez MD Taking Active   zanubrutinib (Brukinsa) 80 mg capsule [id number] Yes Take 2 capsules (160 mg total) by mouth 2 times a day.  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, 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, for which CLL  can progress faster, and this would explain his accelerating lymphocytosis  -I have therefore advised initiation of BTKi  -he is in agreement to proceed  -benefits, risks, potential morbidity related to zanubrutinib were reviewed with Brandon and he signed informed consent to proceed  -he will start zanubrutinib (Brukinsa) 160 mg PO BID  -he will then return 2 weeks after initiation of brukinsa  -he is aware that the circulating lymphocyte count will rise even more after start of therapy--this is how the drug works, and is not at all an indication that his CLL is getting worse  -will also monitor tumor lysis labs  -here for interval followup  -has taken brukinsa for 2 weeks now--has had no ill effects  -labs done on 12/3/2024 included CBC + COMP + LDH, mag , uric acid  -results reviewed--wbc 38.5, hgb 12.3, plt 320,000, ANC 5220, abs lymph 32,070, creatinine 0.72, calcium 9.8, alk phos 48, AST 14, ALT 21,  , mag 1.8, uric acid 5.1  -limited physical exam was done today--no cervical, supraclavicular, axillary adenopathy  -he will continue to take brukinsa 160 mg PO BID  -will see him again in 5 weeks     2. HTN  -on lisinopril  -on amlodipine     3.  HLD  -on trilipix        Problem List Items Addressed This Visit             ICD-10-CM    CLL (chronic lymphocytic leukemia) (Multi) C91.10    Relevant Orders    Clinic Appointment Request Follow Up; GAGE ZAVALA; [facility]    CBC and Auto Differential    Comprehensive metabolic panel            Gage Zavala MD

## 2024-12-06 ENCOUNTER — APPOINTMENT (OUTPATIENT)
Dept: GASTROENTEROLOGY | Facility: HOSPITAL | Age: 49
End: 2024-12-06
Payer: COMMERCIAL

## 2024-12-07 DIAGNOSIS — F41.1 GENERALIZED ANXIETY DISORDER: ICD-10-CM

## 2024-12-09 RX ORDER — ESCITALOPRAM OXALATE 10 MG/1
10 TABLET ORAL DAILY
Qty: 30 TABLET | Refills: 6 | OUTPATIENT
Start: 2024-12-09

## 2024-12-10 DIAGNOSIS — F41.1 GENERALIZED ANXIETY DISORDER: ICD-10-CM

## 2024-12-11 RX ORDER — ESCITALOPRAM OXALATE 10 MG/1
10 TABLET ORAL DAILY
Qty: 30 TABLET | Refills: 0 | Status: SHIPPED | OUTPATIENT
Start: 2024-12-11

## 2024-12-14 ENCOUNTER — SPECIALTY PHARMACY (OUTPATIENT)
Dept: PHARMACY | Facility: CLINIC | Age: 49
End: 2024-12-14

## 2024-12-14 PROCEDURE — RXMED WILLOW AMBULATORY MEDICATION CHARGE

## 2024-12-15 ASSESSMENT — ENCOUNTER SYMPTOMS
ENDOCRINE NEGATIVE: 1
GASTROINTESTINAL NEGATIVE: 1
EYES NEGATIVE: 1
HEMATOLOGIC/LYMPHATIC NEGATIVE: 1
NEUROLOGICAL NEGATIVE: 1
MUSCULOSKELETAL NEGATIVE: 1
PSYCHIATRIC NEGATIVE: 1
RESPIRATORY NEGATIVE: 1
CONSTITUTIONAL NEGATIVE: 1

## 2024-12-15 NOTE — PROGRESS NOTES
[facility] Clinical Note  Initial Patient Education     Introduction  Brandon Snider is a 49 y.o. male who is on the specialty pharmacy service for management of: Oncology Core.    Brandon Snider is initiating the following therapy: zanubrutinib 160 mg (2 x 80 mg) PO twice daily     Medication receipt date: 11/20/24  Duration of therapy: Until drug toxicity or progression    The most recent encounter visit with the referring prescriber Gage Zavala MD on 11/13/24 was reviewed.  Pharmacy will continue to collaborate in the care of this patient with the referring prescriber.    Clinical Background  An initial assessment was conducted prior to first fill of the medication to determine the appropriateness of therapy given the patient's diagnosis, medication list, comorbidities, allergies, medical history, patient's ability to self administer medication, and therapeutic goals based on possible outcomes of therapy. Refer to initial assessment task completed on 11/13/24.    Labs for clinical appropriateness that were reviewed include labs drawn on 11/13/24:  wbc 96.6, hgb 12.4, plt 271,000, ANC 10.630, abs lymph 73.420, creatinine 0.71, calcium 9.9, AST 21, ALT 33, uric acid 6.4, , mag 1.98.      Education/Discussion  Brandon was contacted on 12/15/2024 at 12:54 PM for a pharmacy visit with encounter number [id number] from:   [facility] [id number] [place] [address]   [address]  [facility] [address]  Dept: [phone]  Dept Fax: [phone]  Brandon consented to a/an In person visit, which was performed.    Medication Start Date (planned or actual): 11/20/24  Education was conducted prior to start of therapy? Yes    Education discussed includes the following:  Patient Education  Counseled the Patient on the Following : Theraputic rationale and expected outcomes, Expected duration of therapy, Doses and administration, Adherence and missed doses,  Possible side effects and management, Possible drug interactions, Lab monitoring and follow-up, Safe handling, storage, and disposal, Use of contraception, Cost of medications, Pharmacy contact information  Learner: Patient  Education Method: Explanation, Handout  Education Response: Verbalizes understanding  Additional details of the medication specific counseling are found within the linked patient education flowsheet.     The follow up timeline was discussed. Every person responds to and reacts to therapy differently. Patient should be assessed for efficacy and tolerability in approximately: 10-14 days    Provided education on goals and possible outcomes of therapy:  Adherence with therapy  Timely completion of appropriate labs  Timely and appropriate follow up with provider  Identify and address medication interactions with presciption medications, OTC medications and supplements  Optimize or maintain quality of life    The importance of adherence was discussed and they were advised to take the medication as prescribed by their provider.     Impression/Plan  Review and Assessment   Reviewed During This Encounter: Allergies, Medications, Problem list  Medications Assessed for Appropriate Use, Dose, Route, Frequency, and Duration: Yes  Medication Reconciliation Completed: Yes  Drug Interactions Evaluated: Yes  Clinically Relevant Drug Interactions Identified: No    This patient has not been identified as high risk due to Lack of high risk qualifiers.  The following action was taken: N/A.         Provided contact information ([phone]) for [facility] and reviewed dispensing process, refill timeline and patient management follow up. Advised to contact the pharmacy if there are any adverse effects and/or changes to medication list, including prescriptions, OTC medications, herbal products, or supplements. Confirmed understanding of education conducted during assessment. All questions  and concerns were addressed and patient was encouraged to reach out for additional questions or concerns.    Skip Arteaga RP MS BCOP  Clinical Pharmacy Specialist - Ambulatory Oncology

## 2024-12-17 ENCOUNTER — PHARMACY VISIT (OUTPATIENT)
Dept: PHARMACY | Facility: CLINIC | Age: 49
End: 2024-12-17
Payer: COMMERCIAL

## 2024-12-17 ENCOUNTER — APPOINTMENT (OUTPATIENT)
Dept: PRIMARY CARE | Facility: CLINIC | Age: 49
End: 2024-12-17
Payer: COMMERCIAL

## 2024-12-17 VITALS
TEMPERATURE: 97 F | HEIGHT: 70 IN | SYSTOLIC BLOOD PRESSURE: 124 MMHG | WEIGHT: 256 LBS | BODY MASS INDEX: 36.65 KG/M2 | DIASTOLIC BLOOD PRESSURE: 88 MMHG | RESPIRATION RATE: 18 BRPM | HEART RATE: 82 BPM

## 2024-12-17 DIAGNOSIS — K85.00 IDIOPATHIC ACUTE PANCREATITIS WITHOUT INFECTION OR NECROSIS (HHS-HCC): ICD-10-CM

## 2024-12-17 DIAGNOSIS — E78.00 HYPERCHOLESTEROLEMIA: ICD-10-CM

## 2024-12-17 DIAGNOSIS — F41.1 GENERALIZED ANXIETY DISORDER: Primary | ICD-10-CM

## 2024-12-17 DIAGNOSIS — A04.8 H. PYLORI INFECTION: ICD-10-CM

## 2024-12-17 DIAGNOSIS — K42.9 UMBILICAL HERNIA WITHOUT OBSTRUCTION AND WITHOUT GANGRENE: ICD-10-CM

## 2024-12-17 DIAGNOSIS — I10 PRIMARY HYPERTENSION: ICD-10-CM

## 2024-12-17 PROCEDURE — 3074F SYST BP LT 130 MM HG: CPT | Performed by: FAMILY MEDICINE

## 2024-12-17 PROCEDURE — 1036F TOBACCO NON-USER: CPT | Performed by: FAMILY MEDICINE

## 2024-12-17 PROCEDURE — 99215 OFFICE O/P EST HI 40 MIN: CPT | Performed by: FAMILY MEDICINE

## 2024-12-17 PROCEDURE — 3008F BODY MASS INDEX DOCD: CPT | Performed by: FAMILY MEDICINE

## 2024-12-17 PROCEDURE — 3079F DIAST BP 80-89 MM HG: CPT | Performed by: FAMILY MEDICINE

## 2024-12-17 RX ORDER — AMLODIPINE BESYLATE 10 MG/1
10 TABLET ORAL DAILY
Qty: 30 TABLET | Refills: 6 | Status: SHIPPED | OUTPATIENT
Start: 2024-12-17

## 2024-12-17 RX ORDER — LISINOPRIL 40 MG/1
60 TABLET ORAL DAILY
Qty: 45 TABLET | Refills: 6 | Status: SHIPPED | OUTPATIENT
Start: 2024-12-17

## 2024-12-17 NOTE — PROGRESS NOTES
Brandon Snider is a 49 y.o. male here today for   Chief Complaint   Patient presents with    Anxiety    Hyperlipidemia    Hypertension        HPI     HTN recheck -- Patient denies chest pain, SOB, edema, palpitations on review.  Taking medication correctly and denies any side effects.    HLD recheck -- Patient taking medications correctly.  No SE's of muscle pain or joint pain.  No CP, edema, myalgias.    Mood disorder recheck -- Patient feels like condition is well controlled.  Has good control of mood and emotional reactions.  No SE's or problems with medications.  No homicidal or suicidal ideation.  Patient wishes to continue same medications.  He had tried to wean down and off Lexapro - taking 2 tabs per week and well controlled.  Would like to discontinue it now.      CLL - sees Dr. Zavala.  On new medicine Brukinsa.  WBC's have decreased dramatically since then.  NOTCH1 mutation detected.      Still having trouble losing weight.  Working out regularly.  Trying to eat better.  Has noticed his umbilicus is sticking out a little bit.  He reports no pain over the umbilicus.  He has no previous known history of umbilical hernia.    He is due for a recheck with his gastroenterologist regarding his previous history of H. pylori and pancreatitis.  He asked for a referral back to her.    Current Outpatient Medications:     choline fenofibrate (Trilipix) 135 mg DR capsule, Take 1 capsule (135 mg) by mouth once daily., Disp: 30 capsule, Rfl: 6    fluticasone (Flonase) 50 mcg/actuation nasal spray, Administer 1 spray into each nostril once daily. Shake gently. Before first use, prime pump. After use, clean tip and replace cap., Disp: , Rfl:     zanubrutinib (Brukinsa) 80 mg capsule, Take 2 capsules (160 mg total) by mouth 2 times a day.  Swallow whole, Disp: 120 capsule, Rfl: 2    amLODIPine (Norvasc) 10 mg tablet, Take 1 tablet (10 mg) by mouth once daily., Disp: 30 tablet, Rfl: 6    lisinopril 40 mg tablet, Take 1.5  "tablets (60 mg) by mouth once daily., Disp: 45 tablet, Rfl: 6    Patient Active Problem List   Diagnosis    Allergic rhinitis    Generalized anxiety disorder    CLL (chronic lymphocytic leukemia) (Multi)    Elevated LFTs    Fatty liver    Flu    Hypercholesterolemia    Primary hypertension    Lipoma of chest wall    Mass of chest wall    Pulmonary nodule    Class 2 severe obesity due to excess calories with serious comorbidity and body mass index (BMI) of 38.0 to 38.9 in adult    Idiopathic acute pancreatitis without infection or necrosis (HHS-HCC)    Other ascites    Obesity, Class II, BMI 35-39.9    Generalized abdominal pain    Gastritis    Abnormal CT scan    Acute upper respiratory infection    Leukocytosis    Umbilical hernia without obstruction and without gangrene    H. pylori infection         Objective    Visit Vitals    Visit Vitals  /88   Pulse 82   Temp 36.1 °C (97 °F)   Resp 18   Ht 1.778 m (5' 10\")   Wt 116 kg (256 lb)   BMI 36.73 kg/m²   Smoking Status Never   BSA 2.39 m²       Body mass index is 36.73 kg/m².     Physical Exam     General - Not in acute distress and cooperative.  Build & Nutrition - Well developed  Posture - Normal  Gait - Normal  Mental Status - alert and oriented x 3    Head - Normocephalic    Neck - Thyroid normal size    Eyes - Bilateral - Sclera clear and lids pink without edema or mass.      Skin - Warm and dry with no rashes on visible skin    Lungs - Clear to auscultation and normal breathing effort    Cardiovascular - RRR and no murmurs, rubs or thrill.    Peripheral Vascular - Bilateral - no edema present    Neuropsychiatric - normal mood and affect    Abdomen-patient has a small umbilical hernia with mild bulging of the umbilicus.  It is easily reducible and nontender.    Assessment & Plan  Generalized anxiety disorder  He seems to be doing very well and is only taking Lexapro twice a week.  We discussed options and he is going to discontinue it now.  If he notices " any recurrence of anxiety he should call us and I will restart the medication.       Hypercholesterolemia  Condition seems well controlled.  No change in current treatment.       Primary hypertension  Condition well controlled.  No change in current treatment regimen.  Refill given of current medication.  Appropriate labs ordered or reviewed.  Make a follow up appointment with me for recheck in 6 months.  Orders:    amLODIPine (Norvasc) 10 mg tablet; Take 1 tablet (10 mg) by mouth once daily.    lisinopril 40 mg tablet; Take 1.5 tablets (60 mg) by mouth once daily.    Umbilical hernia without obstruction and without gangrene  He has a new onset umbilical hernia and I will refer him to general surgery for evaluation and advice on treatment.  Orders:    Referral to General Surgery; Future    H. pylori infection  I will refer him back to gastroenterology for follow-up  Orders:    Referral to Gastroenterology; Future    Idiopathic acute pancreatitis without infection or necrosis (HHS-HCC)  I will refer him back to gastroenterology for follow-up.  Orders:    Referral to Gastroenterology; Future         Orders Placed This Encounter      amLODIPine (Norvasc) 10 mg tablet      lisinopril 40 mg tablet       Orders Placed This Encounter   Procedures    Referral to General Surgery    Referral to Gastroenterology        New Medications Ordered This Visit   Medications    amLODIPine (Norvasc) 10 mg tablet     Sig: Take 1 tablet (10 mg) by mouth once daily.     Dispense:  30 tablet     Refill:  6    lisinopril 40 mg tablet     Sig: Take 1.5 tablets (60 mg) by mouth once daily.     Dispense:  45 tablet     Refill:  6

## 2024-12-17 NOTE — ASSESSMENT & PLAN NOTE
He has a new onset umbilical hernia and I will refer him to general surgery for evaluation and advice on treatment.  Orders:    Referral to General Surgery; Future

## 2024-12-17 NOTE — ASSESSMENT & PLAN NOTE
Condition well controlled.  No change in current treatment regimen.  Refill given of current medication.  Appropriate labs ordered or reviewed.  Make a follow up appointment with me for recheck in 6 months.  Orders:    amLODIPine (Norvasc) 10 mg tablet; Take 1 tablet (10 mg) by mouth once daily.    lisinopril 40 mg tablet; Take 1.5 tablets (60 mg) by mouth once daily.

## 2024-12-17 NOTE — ASSESSMENT & PLAN NOTE
He seems to be doing very well and is only taking Lexapro twice a week.  We discussed options and he is going to discontinue it now.  If he notices any recurrence of anxiety he should call us and I will restart the medication.

## 2024-12-17 NOTE — ASSESSMENT & PLAN NOTE
I will refer him back to gastroenterology for follow-up  Orders:    Referral to Gastroenterology; Future

## 2025-01-02 ENCOUNTER — APPOINTMENT (OUTPATIENT)
Dept: SURGERY | Facility: CLINIC | Age: 50
End: 2025-01-02
Payer: COMMERCIAL

## 2025-01-02 ENCOUNTER — LAB (OUTPATIENT)
Dept: LAB | Facility: CLINIC | Age: 50
End: 2025-01-02
Payer: COMMERCIAL

## 2025-01-02 VITALS
BODY MASS INDEX: 37.16 KG/M2 | WEIGHT: 259.6 LBS | HEIGHT: 70 IN | OXYGEN SATURATION: 97 % | TEMPERATURE: 97.3 F | SYSTOLIC BLOOD PRESSURE: 161 MMHG | HEART RATE: 80 BPM | DIASTOLIC BLOOD PRESSURE: 97 MMHG | RESPIRATION RATE: 16 BRPM

## 2025-01-02 DIAGNOSIS — K42.9 UMBILICAL HERNIA WITHOUT OBSTRUCTION AND WITHOUT GANGRENE: Primary | ICD-10-CM

## 2025-01-02 DIAGNOSIS — C91.10 CLL (CHRONIC LYMPHOCYTIC LEUKEMIA) (MULTI): ICD-10-CM

## 2025-01-02 LAB
ALBUMIN SERPL BCP-MCNC: 4.8 G/DL (ref 3.4–5)
ALP SERPL-CCNC: 47 U/L (ref 33–120)
ALT SERPL W P-5'-P-CCNC: 28 U/L (ref 10–52)
ANION GAP SERPL CALC-SCNC: 12 MMOL/L (ref 10–20)
AST SERPL W P-5'-P-CCNC: 19 U/L (ref 9–39)
BASOPHILS # BLD AUTO: 0.05 X10*3/UL (ref 0–0.1)
BASOPHILS NFR BLD AUTO: 0.3 %
BILIRUB SERPL-MCNC: 0.4 MG/DL (ref 0–1.2)
BUN SERPL-MCNC: 12 MG/DL (ref 6–23)
CALCIUM SERPL-MCNC: 9.3 MG/DL (ref 8.6–10.3)
CHLORIDE SERPL-SCNC: 103 MMOL/L (ref 98–107)
CO2 SERPL-SCNC: 31 MMOL/L (ref 21–32)
CREAT SERPL-MCNC: 1.03 MG/DL (ref 0.5–1.3)
EGFRCR SERPLBLD CKD-EPI 2021: 88 ML/MIN/1.73M*2
EOSINOPHIL # BLD AUTO: 0.28 X10*3/UL (ref 0–0.7)
EOSINOPHIL NFR BLD AUTO: 1.4 %
ERYTHROCYTE [DISTWIDTH] IN BLOOD BY AUTOMATED COUNT: 12.8 % (ref 11.5–14.5)
GLUCOSE SERPL-MCNC: 97 MG/DL (ref 74–99)
HCT VFR BLD AUTO: 40.2 % (ref 41–52)
HGB BLD-MCNC: 12.6 G/DL (ref 13.5–17.5)
IMM GRANULOCYTES # BLD AUTO: 0.16 X10*3/UL (ref 0–0.7)
IMM GRANULOCYTES NFR BLD AUTO: 0.8 % (ref 0–0.9)
LYMPHOCYTES # BLD AUTO: 12.03 X10*3/UL (ref 1.2–4.8)
LYMPHOCYTES NFR BLD AUTO: 60.3 %
MCH RBC QN AUTO: 27.9 PG (ref 26–34)
MCHC RBC AUTO-ENTMCNC: 31.3 G/DL (ref 32–36)
MCV RBC AUTO: 89 FL (ref 80–100)
MONOCYTES # BLD AUTO: 0.87 X10*3/UL (ref 0.1–1)
MONOCYTES NFR BLD AUTO: 4.4 %
NEUTROPHILS # BLD AUTO: 6.55 X10*3/UL (ref 1.2–7.7)
NEUTROPHILS NFR BLD AUTO: 32.8 %
NRBC BLD-RTO: ABNORMAL /100{WBCS}
PLATELET # BLD AUTO: 353 X10*3/UL (ref 150–450)
POLYCHROMASIA BLD QL SMEAR: NORMAL
POTASSIUM SERPL-SCNC: 3.8 MMOL/L (ref 3.5–5.3)
PROT SERPL-MCNC: 7 G/DL (ref 6.4–8.2)
RBC # BLD AUTO: 4.51 X10*6/UL (ref 4.5–5.9)
RBC MORPH BLD: NORMAL
SODIUM SERPL-SCNC: 142 MMOL/L (ref 136–145)
WBC # BLD AUTO: 19.9 X10*3/UL (ref 4.4–11.3)

## 2025-01-02 PROCEDURE — 80053 COMPREHEN METABOLIC PANEL: CPT

## 2025-01-02 PROCEDURE — 3080F DIAST BP >= 90 MM HG: CPT | Performed by: SURGERY

## 2025-01-02 PROCEDURE — 85025 COMPLETE CBC W/AUTO DIFF WBC: CPT

## 2025-01-02 PROCEDURE — 3077F SYST BP >= 140 MM HG: CPT | Performed by: SURGERY

## 2025-01-02 PROCEDURE — 99205 OFFICE O/P NEW HI 60 MIN: CPT | Performed by: SURGERY

## 2025-01-02 PROCEDURE — 36415 COLL VENOUS BLD VENIPUNCTURE: CPT

## 2025-01-02 PROCEDURE — 3008F BODY MASS INDEX DOCD: CPT | Performed by: SURGERY

## 2025-01-02 NOTE — PROGRESS NOTES
"History and Physical     Referring Provider: Dr. Bernard Hernandez MD    Chief Complaint: umbilical hernia    History of Present Illness:  This is a 49 y.o.-year-old male who presents with umbilical hernia. He states he first noticed a small bulge about 1 month ago. He denies any inciting event or injury or feeling a pop. He states it only hurts when he coughs. He does not have any other abdominal pain at this time.  Of note, the patient is on zanubrutinib for his CLL and sees Dr. Zavala. He is not sure how long he will be on this medication.      Past Medical History:  Anxiety, CLL, HLD, HTN, fatty liver, H pylori     Past Surgical History:  Foster teeth extraction, EGD     Medications:  As per patient medical records     Allergies:  Hydrochlorothiazide - may have caused pancreatitis     Family History:  The information was reviewed and no pertinent findings are relevant to the presenting problem.     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 Denies nicotine use, rarely EtOH use, or IDU.     Review of Systems  A complete 10 point review of systems was performed and is negative except as noted in the history of present illness.    Physical Exam: BP (!) 161/97 (BP Location: Right arm, Patient Position: Sitting, BP Cuff Size: Large adult)   Pulse 80   Temp 36.3 °C (97.3 °F) (Temporal)   Resp 16   Ht 1.778 m (5' 10\")   Wt 118 kg (259 lb 9.6 oz)   SpO2 97%   BMI 37.25 kg/m²      General: No acute distress.  Neuro: Alert and oriented ×3. Follows commands.  Head: Atraumatic  Eyes: Extraocular motions intact.  Ears: Hears normal speaking voice.  Mouth, Nose, Throat: Mucous membranes moist.  Normal dentition.  Neck: Supple. No appreciable masses.  Breast: Not examined.  Chest: Nonlabored breathing, bilateral chest rise.  Heart: Regular rate and rhythm.  Lung: Clear to auscultation bilaterally.  Abdomen: Soft. Nondistended. Nontender.  There is an umbilical " hernia that is approximately 2 cm.  Rectal: Not examined.  Genitourinary: Not examined.  Musculoskeletal: Moves all extremities.  Normal range of motion.  Skin: No rashes or lesions.  Psychological: Normal affect     Labs:  Labs from 1/2/2025 reviewed and are within normal limits for the patient given his history of CLL, there is leukocytosis that is improving and mild anemia.     Imaging: I have personally reviewed the images and the radiologist's report.  MRI abdomen 2/1/24 - hepatic steatosis, portal caval and hepatic adenopathy probably reactive, 5mm probably benign pancreatic cyst  US gallbladder 11/24/23 - biliary sludge      Assessment:  This is a 49 y.o.-year-old male who presents with an umbilical hernia that he would like repaired. We talked about the surgery, recovery, and restrictions afterwards, all his questions were answered.    The risks benefits and indications for surgery were reviewed with the patient.  The potential of bleeding, infection, myocardial infarction, pulmonary embolism were reviewed.  The use of mesh or prosthetic materials were reviewed with the patient.  Anticipated convalescence was reviewed with the patient.  All questions were answered and consent was obtained.     Plan:  -- We will plan for a possible primary vs umbilical hernia repair with mesh on 3/7/2025, with follow-up on 3/17/2025.  -- I have asked the patient to discuss with Dr. Zavala at his appointment later this month about the upcoming surgery and if any changes related to his regimen are needed, please let me know.  -- We will plan for surgery to be performed as an outpatient.  -- We will obtain Preadmission Testing (PAT).  -- We will apply Dermabond, so the patient may shower the day after surgery.  No swimming or tub soaks for 2 weeks post-operatively.  -- No heavy lifting >20lbs for a total of 6 weeks after surgery.       Abi Suarez DO  Family Medicine - PGY-1        Vidhya Holley MD MPH   General  Surgery  Office: [phone]  Fax: [phone]

## 2025-01-03 ENCOUNTER — OFFICE VISIT (OUTPATIENT)
Dept: HEMATOLOGY/ONCOLOGY | Facility: CLINIC | Age: 50
End: 2025-01-03
Payer: COMMERCIAL

## 2025-01-03 VITALS
DIASTOLIC BLOOD PRESSURE: 95 MMHG | WEIGHT: 255.95 LBS | SYSTOLIC BLOOD PRESSURE: 161 MMHG | HEART RATE: 80 BPM | RESPIRATION RATE: 16 BRPM | BODY MASS INDEX: 36.73 KG/M2 | OXYGEN SATURATION: 94 % | TEMPERATURE: 96.8 F

## 2025-01-03 DIAGNOSIS — C91.10 CLL (CHRONIC LYMPHOCYTIC LEUKEMIA) (MULTI): Primary | ICD-10-CM

## 2025-01-03 DIAGNOSIS — I10 PRIMARY HYPERTENSION: ICD-10-CM

## 2025-01-03 DIAGNOSIS — E78.00 HYPERCHOLESTEROLEMIA: ICD-10-CM

## 2025-01-03 LAB — PATH REVIEW-CBC DIFFERENTIAL: NORMAL

## 2025-01-03 PROCEDURE — 99214 OFFICE O/P EST MOD 30 MIN: CPT | Performed by: INTERNAL MEDICINE

## 2025-01-03 PROCEDURE — 3077F SYST BP >= 140 MM HG: CPT | Performed by: INTERNAL MEDICINE

## 2025-01-03 PROCEDURE — 3080F DIAST BP >= 90 MM HG: CPT | Performed by: INTERNAL MEDICINE

## 2025-01-03 ASSESSMENT — ENCOUNTER SYMPTOMS
CONSTITUTIONAL NEGATIVE: 1
MUSCULOSKELETAL NEGATIVE: 1
ENDOCRINE NEGATIVE: 1
RESPIRATORY NEGATIVE: 1
PSYCHIATRIC NEGATIVE: 1
HEMATOLOGIC/LYMPHATIC NEGATIVE: 1
NEUROLOGICAL NEGATIVE: 1
GASTROINTESTINAL NEGATIVE: 1
CARDIOVASCULAR NEGATIVE: 1
EYES NEGATIVE: 1

## 2025-01-03 ASSESSMENT — PAIN SCALES - GENERAL: PAINLEVEL_OUTOF10: 0-NO PAIN

## 2025-01-03 NOTE — PROGRESS NOTES
Patient ID: Brandon Snider is a 49 y.o. male.  Referring Physician: Gage Zavala MD  [id number]  [place] [address]  Primary Care Provider: Bernard Hernandez MD  Visit Type: Follow Up      Subjective    HPI I saw my blood counts already    Review of Systems   Constitutional: Negative.    HENT:  Negative.     Eyes: Negative.    Respiratory: Negative.     Cardiovascular: Negative.    Gastrointestinal: Negative.    Endocrine: Negative.    Genitourinary: Negative.     Musculoskeletal: Negative.    Skin: Negative.    Neurological: Negative.    Hematological: Negative.    Psychiatric/Behavioral: Negative.          Objective   BSA: 2.39 meters squared  BP (!) 161/95 (BP Location: Right arm) Comment: B/P reported to Carla Barreto RN (LS)  Pulse 80   Temp 36 °C (96.8 °F) (Temporal)   Resp 16   Wt 116 kg (255 lb 15.3 oz)   SpO2 94%   BMI 36.73 kg/m²      has a past medical history of Encounter for general adult medical examination without abnormal findings (11/05/2019), Hypertension, Obesity, unspecified (11/05/2019), Other specified abnormal findings of blood chemistry (04/29/2021), Other specified counseling (12/14/2020), Personal history of diseases of the blood and blood-forming organs and certain disorders involving the immune mechanism (04/29/2021), and Sinobronchitis (01/27/2023).   has no past surgical history on file.  Family History   Problem Relation Name Age of Onset    Esophageal cancer Mother      Hypertension Mother      Other (elevated liver enzymes) Father      Other (cardiac disorder) Father      Prostate cancer Father      Prostate cancer Paternal Grandfather      Prostate cancer Other grandfather      Oncology History   CLL (chronic lymphocytic leukemia) (Multi)   1/27/2023 Initial Diagnosis    CLL (chronic lymphocytic leukemia) (Multi)     11/22/2024 -  Chemotherapy    Zanubrutinib, 84 Day Cycles         Brandon Snider  reports that he has never smoked. He has never used  smokeless tobacco.  He  reports current alcohol use.  He  reports no history of drug use.    Physical Exam  Vitals reviewed.   Constitutional:       Appearance: Normal appearance.   HENT:      Head: Normocephalic.      Mouth/Throat:      Mouth: Mucous membranes are moist.   Eyes:      Extraocular Movements: Extraocular movements intact.      Pupils: Pupils are equal, round, and reactive to light.   Cardiovascular:      Rate and Rhythm: Normal rate and regular rhythm.      Pulses: Normal pulses.      Heart sounds: Normal heart sounds.   Pulmonary:      Effort: Pulmonary effort is normal.      Breath sounds: Normal breath sounds.   Abdominal:      General: Bowel sounds are normal.      Palpations: Abdomen is soft.   Musculoskeletal:         General: Normal range of motion.      Cervical back: Normal range of motion and neck supple.   Skin:     General: Skin is warm.   Neurological:      General: No focal deficit present.      Mental Status: He is alert and oriented to person, place, and time.   Psychiatric:         Mood and Affect: Mood normal.         Behavior: Behavior normal.         WBC   Date/Time Value Ref Range Status   01/02/2025 01:49 PM 19.9 (H) 4.4 - 11.3 x10*3/uL Final   12/03/2024 09:21 AM 38.5 (H) 4.4 - 11.3 x10*3/uL Final   11/13/2024 08:52 AM 96.6 (HH) 4.4 - 11.3 x10*3/uL Final     nRBC   Date Value Ref Range Status   01/02/2025   Final     Comment:     Not Measured   12/03/2024   Final     Comment:     Not Measured   11/13/2024   Final     Comment:     Not Measured     RBC   Date Value Ref Range Status   01/02/2025 4.51 4.50 - 5.90 x10*6/uL Final   12/03/2024 4.28 (L) 4.50 - 5.90 x10*6/uL Final   11/13/2024 4.23 (L) 4.50 - 5.90 x10*6/uL Final     Hemoglobin   Date Value Ref Range Status   01/02/2025 12.6 (L) 13.5 - 17.5 g/dL Final   12/03/2024 12.3 (L) 13.5 - 17.5 g/dL Final   11/13/2024 12.4 (L) 13.5 - 17.5 g/dL Final     Hematocrit   Date Value Ref Range Status   01/02/2025 40.2 (L) 41.0 - 52.0 %  "Final   12/03/2024 39.1 (L) 41.0 - 52.0 % Final   11/13/2024 38.2 (L) 41.0 - 52.0 % Final     MCV   Date/Time Value Ref Range Status   01/02/2025 01:49 PM 89 80 - 100 fL Final   12/03/2024 09:21 AM 91 80 - 100 fL Final   11/13/2024 08:52 AM 90 80 - 100 fL Final     MCH   Date/Time Value Ref Range Status   01/02/2025 01:49 PM 27.9 26.0 - 34.0 pg Final   12/03/2024 09:21 AM 28.7 26.0 - 34.0 pg Final   11/13/2024 08:52 AM 29.3 26.0 - 34.0 pg Final     MCHC   Date/Time Value Ref Range Status   01/02/2025 01:49 PM 31.3 (L) 32.0 - 36.0 g/dL Final   12/03/2024 09:21 AM 31.5 (L) 32.0 - 36.0 g/dL Final   11/13/2024 08:52 AM 32.5 32.0 - 36.0 g/dL Final     RDW   Date/Time Value Ref Range Status   01/02/2025 01:49 PM 12.8 11.5 - 14.5 % Final   12/03/2024 09:21 AM 13.7 11.5 - 14.5 % Final   11/13/2024 08:52 AM 14.2 11.5 - 14.5 % Final     Platelets   Date/Time Value Ref Range Status   01/02/2025 01:49  150 - 450 x10*3/uL Final   12/03/2024 09:21  150 - 450 x10*3/uL Final   11/13/2024 08:52  150 - 450 x10*3/uL Final     No results found for: \"MPV\"  Neutrophils %   Date/Time Value Ref Range Status   01/02/2025 01:49 PM 32.8 40.0 - 80.0 % Final   12/03/2024 09:21 AM 13.5 40.0 - 80.0 % Final   11/28/2023 02:23 PM 18.2 40.0 - 80.0 % Final     Immature Granulocytes %, Automated   Date/Time Value Ref Range Status   01/02/2025 01:49 PM 0.8 0.0 - 0.9 % Final     Comment:     Immature Granulocyte Count (IG) includes promyelocytes, myelocytes and metamyelocytes but does not include bands. Percent differential counts (%) should be interpreted in the context of the absolute cell counts (cells/UL).   12/03/2024 09:21 AM 0.3 0.0 - 0.9 % Final     Comment:     Immature Granulocyte Count (IG) includes promyelocytes, myelocytes and metamyelocytes but does not include bands. Percent differential counts (%) should be interpreted in the context of the absolute cell counts (cells/UL).   11/13/2024 08:52 AM 0.9 0.0 - 0.9 % Final     " Comment:     Immature Granulocyte Count (IG) includes promyelocytes, myelocytes and metamyelocytes but does not include bands. Percent differential counts (%) should be interpreted in the context of the absolute cell counts (cells/UL).     Lymphocytes %, Manual   Date/Time Value Ref Range Status   11/13/2024 08:52 AM 76.0 13.0 - 44.0 % Final   07/10/2024 03:26 PM 88.0 13.0 - 44.0 % Final   06/20/2024 09:05 AM 91.0 13.0 - 44.0 % Final     Lymphocytes %   Date/Time Value Ref Range Status   01/02/2025 01:49 PM 60.3 13.0 - 44.0 % Final   12/03/2024 09:21 AM 83.2 13.0 - 44.0 % Final     Monocytes %, Manual   Date/Time Value Ref Range Status   11/13/2024 08:52 AM 5.0 2.0 - 10.0 % Final   07/10/2024 03:26 PM 2.0 2.0 - 10.0 % Final   06/20/2024 09:05 AM 2.0 2.0 - 10.0 % Final     Monocytes %   Date/Time Value Ref Range Status   01/02/2025 01:49 PM 4.4 2.0 - 10.0 % Final   12/03/2024 09:21 AM 2.1 2.0 - 10.0 % Final     Eosinophils %, Manual   Date/Time Value Ref Range Status   11/13/2024 08:52 AM 0.0 0.0 - 6.0 % Final   07/10/2024 03:26 PM 1.0 0.0 - 6.0 % Final   06/20/2024 09:05 AM 1.0 0.0 - 6.0 % Final     Eosinophils %   Date/Time Value Ref Range Status   01/02/2025 01:49 PM 1.4 0.0 - 6.0 % Final   12/03/2024 09:21 AM 0.7 0.0 - 6.0 % Final     Basophils %, Manual   Date/Time Value Ref Range Status   11/13/2024 08:52 AM 0.0 0.0 - 2.0 % Final   07/10/2024 03:26 PM 0.0 0.0 - 2.0 % Final   06/20/2024 09:05 AM 0.0 0.0 - 2.0 % Final     Basophils %   Date/Time Value Ref Range Status   01/02/2025 01:49 PM 0.3 0.0 - 2.0 % Final   12/03/2024 09:21 AM 0.2 0.0 - 2.0 % Final     Neutrophils Absolute   Date/Time Value Ref Range Status   01/02/2025 01:49 PM 6.55 1.20 - 7.70 x10*3/uL Final     Comment:     Percent differential counts (%) should be interpreted in the context of the absolute cell counts (cells/uL).   12/03/2024 09:21 AM 5.22 1.20 - 7.70 x10*3/uL Final     Comment:     Percent differential counts (%) should be interpreted  "in the context of the absolute cell counts (cells/uL).   11/28/2023 02:23 PM 6.13 1.20 - 7.70 x10*3/uL Final     Comment:     Percent differential counts (%) should be interpreted in the context of the absolute cell counts (cells/uL).     Immature Granulocytes Absolute, Automated   Date/Time Value Ref Range Status   01/02/2025 01:49 PM 0.16 0.00 - 0.70 x10*3/uL Final   12/03/2024 09:21 AM 0.11 0.00 - 0.70 x10*3/uL Final   11/13/2024 08:52 AM 0.87 (H) 0.00 - 0.70 x10*3/uL Final     Lymphocytes Absolute   Date/Time Value Ref Range Status   01/02/2025 01:49 PM 12.03 (H) 1.20 - 4.80 x10*3/uL Final   12/03/2024 09:21 AM 32.07 (H) 1.20 - 4.80 x10*3/uL Final   11/28/2023 02:23 PM 23.89 (H) 1.20 - 4.80 x10*3/uL Final     Monocytes Absolute   Date/Time Value Ref Range Status   01/02/2025 01:49 PM 0.87 0.10 - 1.00 x10*3/uL Final   12/03/2024 09:21 AM 0.80 0.10 - 1.00 x10*3/uL Final   11/28/2023 02:23 PM 2.48 (H) 0.10 - 1.00 x10*3/uL Final     Eosinophils Absolute   Date/Time Value Ref Range Status   01/02/2025 01:49 PM 0.28 0.00 - 0.70 x10*3/uL Final   12/03/2024 09:21 AM 0.26 0.00 - 0.70 x10*3/uL Final     Eosinophils Absolute, Manual   Date/Time Value Ref Range Status   11/13/2024 08:52 AM 0.00 0.00 - 0.70 x10*3/uL Final   07/10/2024 03:26 PM 0.71 (H) 0.00 - 0.70 x10*3/uL Final   06/20/2024 09:05 AM 0.66 0.00 - 0.70 x10*3/uL Final     Basophils Absolute   Date/Time Value Ref Range Status   01/02/2025 01:49 PM 0.05 0.00 - 0.10 x10*3/uL Final     Comment:     Automated WBC differential has been confirmed by manual smear.   12/03/2024 09:21 AM 0.07 0.00 - 0.10 x10*3/uL Final     Comment:     Automated WBC differential has been confirmed by manual smear.     Basophils Absolute, Manual   Date/Time Value Ref Range Status   11/13/2024 08:52 AM 0.00 0.00 - 0.10 x10*3/uL Final   07/10/2024 03:26 PM 0.00 0.00 - 0.10 x10*3/uL Final   06/20/2024 09:05 AM 0.00 0.00 - 0.10 x10*3/uL Final       No components found for: \"PT\"  No results " "found for: \"APTT\"  Medication Documentation Review Audit       Reviewed by Uyen Hull MA (Medical Assistant) on 01/03/25 at 1230      Medication Order Taking? Sig Documenting Provider Last Dose Status   amLODIPine (Norvasc) 10 mg tablet [id number] Yes Take 1 tablet (10 mg) by mouth once daily. Bernard Hernandez MD  Active   choline fenofibrate (Trilipix) 135 mg DR capsule [id number] Yes Take 1 capsule (135 mg) by mouth once daily. Bernard Hernandez MD Taking Active   fluticasone (Flonase) 50 mcg/actuation nasal spray [id number] Yes Administer 1 spray into each nostril once daily. Shake gently. Before first use, prime pump. After use, clean tip and replace cap. Historical Provider, MD Taking Active   lisinopril 40 mg tablet [id number] Yes Take 1.5 tablets (60 mg) by mouth once daily. Bernard Hernandez MD  Active   zanubrutinib (Brukinsa) 80 mg capsule [id number] Yes Take 2 capsules (160 mg total) by mouth 2 times a day.  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, 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, for which CLL can progress faster, and this would explain his accelerating lymphocytosis  -I have therefore advised initiation of BTKi  -he is in agreement to proceed  -benefits, risks, potential morbidity related to zanubrutinib were reviewed with Brandon and he signed informed consent to proceed  -he will start zanubrutinib " (Brukinsa) 160 mg PO BID  -he will then return 2 weeks after initiation of brukinsa  -he is aware that the circulating lymphocyte count will rise even more after start of therapy--this is how the drug works, and is not at all an indication that his CLL is getting worse  -will also monitor tumor lysis labs  -here for interval followup  -has taken brukinsa for 7 weeks now--has had no ill effects  -feels really well  -saw general surgery yesterday--plan is for umbilical hernia repair in March  -labs done on 1/3/2025 included CBC + COMP   -results reviewed--wbc 19.9, hgb 12.6, plt 353,000, ANC 6550, abs lymph 12,030, creatinine 1.03, calcium 9.3, alk phos 47, AST 19, ALT 28  -limited physical exam was done today--no cervical, supraclavicular, axillary adenopathy  -he will continue to take brukinsa 160 mg PO BID  -I do not recommend that he stop brukinsa delmy-operatively--he has a very rare subtype of CLL (NOTCH1+) which is considered aggressive and patients who go off BTKi can start relapsing within 1-2 weeks; I have advised Ed that he should continue to take brukinsa without interruption  -he was concerned that his BP was high today (was normal with PCP 2 weeks ago); brukinsa can be associated (14% incidence) of hypertension  -advised him first to check BP at home daily for next 2 weeks and to followup with PCP if he has duplicated high BP readings  -will see him again in 3 months     2. HTN  -on lisinopril  -on amlodipine     3.  HLD  -on trilipix        Problem List Items Addressed This Visit             ICD-10-CM    CLL (chronic lymphocytic leukemia) (Multi) C91.10            Gage Zavala MD

## 2025-01-13 ENCOUNTER — SPECIALTY PHARMACY (OUTPATIENT)
Dept: PHARMACY | Facility: CLINIC | Age: 50
End: 2025-01-13

## 2025-01-13 PROCEDURE — RXMED WILLOW AMBULATORY MEDICATION CHARGE

## 2025-01-16 ENCOUNTER — PHARMACY VISIT (OUTPATIENT)
Dept: PHARMACY | Facility: CLINIC | Age: 50
End: 2025-01-16
Payer: COMMERCIAL

## 2025-02-10 ENCOUNTER — SPECIALTY PHARMACY (OUTPATIENT)
Dept: PHARMACY | Facility: CLINIC | Age: 50
End: 2025-02-10

## 2025-02-10 DIAGNOSIS — C91.10 CLL (CHRONIC LYMPHOCYTIC LEUKEMIA) (MULTI): ICD-10-CM

## 2025-02-11 PROCEDURE — RXMED WILLOW AMBULATORY MEDICATION CHARGE

## 2025-02-12 ENCOUNTER — SPECIALTY PHARMACY (OUTPATIENT)
Dept: PHARMACY | Facility: CLINIC | Age: 50
End: 2025-02-12

## 2025-02-13 ENCOUNTER — PHARMACY VISIT (OUTPATIENT)
Dept: PHARMACY | Facility: CLINIC | Age: 50
End: 2025-02-13
Payer: COMMERCIAL

## 2025-02-16 DIAGNOSIS — E78.00 HYPERCHOLESTEROLEMIA: ICD-10-CM

## 2025-02-17 RX ORDER — FENOFIBRIC ACID 135 MG/1
135 CAPSULE, DELAYED RELEASE ORAL DAILY
Qty: 30 CAPSULE | Refills: 6 | Status: SHIPPED | OUTPATIENT
Start: 2025-02-17

## 2025-02-18 NOTE — ASSESSMENT & PLAN NOTE
I will refer him back to gastroenterology for follow-up.  Orders:    Referral to Gastroenterology; Future     PROGRESS NOTE    SUBJECTIVE:   Maurizio Morgan is a 47 y.o. male seen for a follow up visit regarding the following chief complaint:     Chief Complaint   Patient presents with    Annual Exam     Labs follow up           HPI  Patient presents office today for complete physical without complaints    Past Medical History, Past Surgical History, Family history, Social History, and Medications were all reviewed with the patient today and updated as necessary.       No current outpatient medications on file.     No current facility-administered medications for this visit.     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    Groin pain     Hydronephrosis     Low back pain     Other ureteric obstruction      Past Surgical History:   Procedure Laterality Date    COLONOSCOPY N/A 9/16/2022    COLORECTAL CANCER SCREENING, NOT HIGH RISK//bmi 20 performed by Martin Deleon MD at [facility] ENDOSCOPY    CYSTOSTOMY  09/2011    with stent placement    HEENT  2006    throat abscess    HERNIA REPAIR Bilateral 2018    [facility]    UROLOGICAL SURGERY  2008    cystoscopy     Family History   Problem Relation Age of Onset    Cancer Brother         leukemia    Hypertension Father     Hypertension Mother     Diabetes Father      Social History     Tobacco Use    Smoking status: Every Day     Current packs/day: 1.50     Average packs/day: 1.5 packs/day for 33.1 years (49.7 ttl pk-yrs)     Types: Cigarettes     Start date: 1992    Smokeless tobacco: Never   Substance Use Topics    Alcohol use: No         Review of Systems   Constitutional:  Negative for chills and fever.   HENT:  Negative for sore throat.    Eyes:  Negative for visual disturbance.

## 2025-02-25 ENCOUNTER — LAB (OUTPATIENT)
Dept: LAB | Facility: HOSPITAL | Age: 50
End: 2025-02-25
Payer: COMMERCIAL

## 2025-02-25 ENCOUNTER — PRE-ADMISSION TESTING (OUTPATIENT)
Dept: PREADMISSION TESTING | Facility: HOSPITAL | Age: 50
End: 2025-02-25
Payer: COMMERCIAL

## 2025-02-25 ENCOUNTER — TELEPHONE (OUTPATIENT)
Dept: HEMATOLOGY/ONCOLOGY | Facility: CLINIC | Age: 50
End: 2025-02-25

## 2025-02-25 DIAGNOSIS — K42.9 UMBILICAL HERNIA WITHOUT OBSTRUCTION OR GANGRENE: Primary | ICD-10-CM

## 2025-02-25 DIAGNOSIS — Z01.818 PRE-OP TESTING: ICD-10-CM

## 2025-02-25 DIAGNOSIS — Z01.818 ENCOUNTER FOR OTHER PREPROCEDURAL EXAMINATION: ICD-10-CM

## 2025-02-25 DIAGNOSIS — K42.9 UMBILICAL HERNIA WITHOUT OBSTRUCTION AND WITHOUT GANGRENE: ICD-10-CM

## 2025-02-25 DIAGNOSIS — Z01.818 PREOP EXAMINATION: Primary | ICD-10-CM

## 2025-02-25 LAB
ANION GAP SERPL CALC-SCNC: 12 MMOL/L (ref 10–20)
ATRIAL RATE: 70 BPM
BUN SERPL-MCNC: 12 MG/DL (ref 6–23)
CALCIUM SERPL-MCNC: 9.8 MG/DL (ref 8.6–10.3)
CHLORIDE SERPL-SCNC: 102 MMOL/L (ref 98–107)
CO2 SERPL-SCNC: 28 MMOL/L (ref 21–32)
CREAT SERPL-MCNC: 0.71 MG/DL (ref 0.5–1.3)
EGFRCR SERPLBLD CKD-EPI 2021: >90 ML/MIN/1.73M*2
ERYTHROCYTE [DISTWIDTH] IN BLOOD BY AUTOMATED COUNT: 13.7 % (ref 11.5–14.5)
GLUCOSE SERPL-MCNC: 88 MG/DL (ref 74–99)
HCT VFR BLD AUTO: 39.6 % (ref 41–52)
HGB BLD-MCNC: 12.9 G/DL (ref 13.5–17.5)
MCH RBC QN AUTO: 27.4 PG (ref 26–34)
MCHC RBC AUTO-ENTMCNC: 32.6 G/DL (ref 32–36)
MCV RBC AUTO: 84 FL (ref 80–100)
NRBC BLD-RTO: 0 /100 WBCS (ref 0–0)
P AXIS: 21 DEGREES
P OFFSET: 194 MS
P ONSET: 138 MS
PLATELET # BLD AUTO: 360 X10*3/UL (ref 150–450)
POTASSIUM SERPL-SCNC: 3.9 MMOL/L (ref 3.5–5.3)
PR INTERVAL: 156 MS
Q ONSET: 216 MS
QRS COUNT: 11 BEATS
QRS DURATION: 100 MS
QT INTERVAL: 394 MS
QTC CALCULATION(BAZETT): 425 MS
QTC FREDERICIA: 415 MS
R AXIS: -3 DEGREES
RBC # BLD AUTO: 4.7 X10*6/UL (ref 4.5–5.9)
SODIUM SERPL-SCNC: 138 MMOL/L (ref 136–145)
T AXIS: 16 DEGREES
T OFFSET: 413 MS
VENTRICULAR RATE: 70 BPM
WBC # BLD AUTO: 12.2 X10*3/UL (ref 4.4–11.3)

## 2025-02-25 PROCEDURE — 93005 ELECTROCARDIOGRAM TRACING: CPT

## 2025-02-25 PROCEDURE — 85027 COMPLETE CBC AUTOMATED: CPT

## 2025-02-25 PROCEDURE — 93010 ELECTROCARDIOGRAM REPORT: CPT | Performed by: INTERNAL MEDICINE

## 2025-02-25 PROCEDURE — 87081 CULTURE SCREEN ONLY: CPT | Mod: STJLAB

## 2025-02-25 PROCEDURE — 99202 OFFICE O/P NEW SF 15 MIN: CPT | Performed by: NURSE PRACTITIONER

## 2025-02-25 PROCEDURE — 80048 BASIC METABOLIC PNL TOTAL CA: CPT

## 2025-02-25 RX ORDER — CHLORHEXIDINE GLUCONATE ORAL RINSE 1.2 MG/ML
SOLUTION DENTAL
Qty: 473 ML | Refills: 0 | Status: SHIPPED | OUTPATIENT
Start: 2025-02-25

## 2025-02-25 ASSESSMENT — DUKE ACTIVITY SCORE INDEX (DASI)
CAN YOU DO MODERATE WORK AROUND THE HOUSE LIKE VACUUMING, SWEEPING FLOORS OR CARRYING GROCERIES: YES
CAN YOU DO YARD WORK LIKE RAKING LEAVES, WEEDING OR PUSHING A MOWER: YES
CAN YOU WALK INDOORS, SUCH AS AROUND YOUR HOUSE: YES
CAN YOU CLIMB A FLIGHT OF STAIRS OR WALK UP A HILL: YES
CAN YOU RUN A SHORT DISTANCE: YES
CAN YOU PARTICIPATE IN MODERATE RECREATIONAL ACTIVITIES LIKE GOLF, BOWLING, DANCING, DOUBLES TENNIS OR THROWING A BASEBALL OR FOOTBALL: YES
CAN YOU HAVE SEXUAL RELATIONS: YES
CAN YOU WALK A BLOCK OR TWO ON LEVEL GROUND: YES
DASI METS SCORE: 9.9
CAN YOU TAKE CARE OF YOURSELF (EAT, DRESS, BATHE, OR USE TOILET): YES
CAN YOU DO LIGHT WORK AROUND THE HOUSE LIKE DUSTING OR WASHING DISHES: YES
CAN YOU PARTICIPATE IN STRENOUS SPORTS LIKE SWIMMING, SINGLES TENNIS, FOOTBALL, BASKETBALL, OR SKIING: YES
CAN YOU DO HEAVY WORK AROUND THE HOUSE LIKE SCRUBBING FLOORS OR LIFTING AND MOVING HEAVY FURNITURE: YES
TOTAL_SCORE: 58.2

## 2025-02-25 ASSESSMENT — ACTIVITIES OF DAILY LIVING (ADL): ADL_SCORE: 0

## 2025-02-25 ASSESSMENT — LIFESTYLE VARIABLES: SMOKING_STATUS: NONSMOKER

## 2025-02-25 NOTE — PREPROCEDURE INSTRUCTIONS
Medication List            Accurate as of February 25, 2025 10:12 AM. Always use your most recent med list.                amLODIPine 10 mg tablet  Commonly known as: Norvasc  Take 1 tablet (10 mg) by mouth once daily.  Medication Adjustments for Surgery: Take/Use as prescribed     Brukinsa 80 mg capsule  Generic drug: zanubrutinib  Take 2 capsules (160 mg total) by mouth 2 times a day.  Swallow whole  Additional Medication Adjustments for Surgery: Other (Comment)  Notes to patient: Coordinate with prescribing provider for further instructions on this medications prior to surgery.     chlorhexidine 0.12 % solution  Commonly known as: Peridex  Swish and spit 15 ml for 2 doses, 15mL the night before surgery and 15 ml morning of surgery - swish for 30 seconds -DO NOT SWALLOW, SPIT OUT  Medication Adjustments for Surgery: Take/Use as prescribed     choline fenofibrate 135 mg DR capsule  Commonly known as: Trilipix  TAKE 1 CAPSULE BY MOUTH ONCE DAILY  Medication Adjustments for Surgery: Take last dose 1 day (24 hours) before surgery  Notes to patient: Last dose will be 3/5     fluticasone 50 mcg/actuation nasal spray  Commonly known as: Flonase  Medication Adjustments for Surgery: Take/Use as prescribed  Notes to patient: STOP all NSAIDS (Ibuprofen, Motrin, Aleve), vitamins, herbals, supplements, and all over the counter medications for 7 days prior to surgery    Tylenol is okay to take up until the morning of surgery for pain or headache if needed      lisinopril 40 mg tablet  Take 1.5 tablets (60 mg) by mouth once daily.  Additional Medication Adjustments for Surgery: Other (Comment)  Notes to patient: HOLD any evening dose the night before the day of surgery  HOLD the day of surgery                                  PRE-OPERATIVE INSTRUCTIONS    You will receive notification one business day prior to your procedure to confirm your arrival time. It is important that you answer your phone and/or check your messages  during this time. If you do not hear from the surgery center by 5 pm. the day before your procedure, please call [phone].     Please enter the building through the Outpatient entrance and take the elevator off the lobby to the 2nd floor then check in at the Outpatient Surgery desk on the 2nd floor.    INSTRUCTIONS:  Talk to your surgeon for instructions if you should stop your aspirin, blood thinner, or diabetes medicines.  DO NOT take any multivitamins or over the counter supplements for 7-10 days before surgery.  If not being admitted, you must have an adult immediately available to drive you home after surgery. We also highly recommend you have someone stay with you for the entire day and night of your surgery.  For children having surgery, a parent or legal guardian must accompany them to the surgery center. If this is not possible, please call [phone] to make additional arrangements.  For adults who are unable to consent or make medical decisions for themselves, a legal guardian or Power of  must accompany them to the surgery center. If this is not possible, please call [phone] to make additional arrangements.  Wear comfortable, loose fitting clothing.  All jewelry and piercings must be removed. If you are unable to remove an item or have a dermal piercing, please be sure to tell the nurse when you arrive for surgery.  Nail polish and make-up must be removed.  Avoid smoking or consuming alcohol for 24 hours before surgery.  To help prevent infection, please take a shower/bath and wash your hair the night before and/or morning of surgery (or follow other specific bathing instructions provided).    Preoperative Fasting Guidelines    Why must I stop eating and drinking near surgery time?  With sedation, food or liquid in your stomach can enter your lungs causing serious complications  Increases nausea and vomiting    When do I need to stop eating and drinking before my surgery?  Do not eat  any solid food after midnight the night before your surgery/procedure unless otherwise instructed by your surgeon.   You may have up to 13.5 ounces of clear liquid until TWO hours before your instructed arrival time to the hospital.  This includes water, black tea/coffee, (no milk or cream) apple juice, and electrolyte drinks (Gatorade).   You may chew gum until TWO hours before your surgery/procedure      If applicable, notify your surgeons office immediately of any new skin changes that occur to the surgical limb.      If you have any questions or concerns, please call Pre-Admission Testing at [phone].

## 2025-02-25 NOTE — TELEPHONE ENCOUNTER
Per [facility] & Dr. Zavala: If laparoscopic, would rec 3 days +/- surgery.  If open, 7 days +/-       Left detailed message on name specific voice mail with the update on holding his Brukinsa before his hernia repair from [facility] & Dr. Zavala above. Encouraged the patient to call the office back with any further questions or concerns.

## 2025-02-25 NOTE — CPM/PAT H&P
CPM/PAT Evaluation       Name: Brandon Snider (Brandon Snider)  /Age: 1975/50 y.o.     In-Person       Chief Complaint: pre op appointment for upcoming hernia surgery    HPI    EK is a 51 yo male who history of hypertension and CLL. He developed an umbilical bulge in the last few months and was referred to general surgery for evaluation of umbilical hernia. Treatment options discussed and subsequently he has been scheduled for a hernia repair surgery. Presents to [facility] today for perioperative risk stratification and optimization. He denies any acute pains from hernia and no bruising from region. Otherwise denies any recent illness, fever/chills, chest pains or shortness of breath.     Past Medical History:   Diagnosis Date    CLL (chronic lymphocytic leukemia) (Multi)     Encounter for general adult medical examination without abnormal findings 2019    Annual physical exam    Hyperlipidemia     Hypertension     Obesity, unspecified 2019    Class 2 obesity with body mass index (BMI) of 36.0 to 36.9 in adult, unspecified obesity type, unspecified whether serious comorbidity present    Other specified abnormal findings of blood chemistry 2021    Elevated LFTs    Other specified counseling 2020    Encounter for medication review and counseling    Personal history of diseases of the blood and blood-forming organs and certain disorders involving the immune mechanism 2021    History of leukocytosis    Sinobronchitis 2023       Past Surgical History:   Procedure Laterality Date    DENTAL SURGERY Bilateral     UPPER GASTROINTESTINAL ENDOSCOPY         Patient  reports being sexually active.    Family History   Problem Relation Name Age of Onset    Esophageal cancer Mother      Hypertension Mother      Other (elevated liver enzymes) Father      Other (cardiac disorder) Father      Prostate cancer Father      Prostate cancer Paternal Grandfather      Prostate cancer Other  grandfather        Allergies   Allergen Reactions    Hydrochlorothiazide Other     May have caused pancreatitis 11/2023.       Prior to Admission medications    Medication Sig Start Date End Date Taking? Authorizing Provider   amLODIPine (Norvasc) 10 mg tablet Take 1 tablet (10 mg) by mouth once daily. 12/17/24   Bernard Hernandez MD   choline fenofibrate (Trilipix) 135 mg DR capsule TAKE 1 CAPSULE BY MOUTH ONCE DAILY 2/17/25   Bernard Hernandez MD   fluticasone (Flonase) 50 mcg/actuation nasal spray Administer 1 spray into each nostril once daily. Shake gently. Before first use, prime pump. After use, clean tip and replace cap.    Historical Provider, MD   lisinopril 40 mg tablet Take 1.5 tablets (60 mg) by mouth once daily. 12/17/24   Bernard Hernandez MD   zanubrutinib (Brukinsa) 80 mg capsule Take 2 capsules (160 mg total) by mouth 2 times a day.  Swallow whole 2/10/25   Gage Zavala MD        PAT ROS   Constitutional: Negative for fever, chills, or sweats   ENMT: Negative for nasal discharge, congestion, ear pain, mouth pain, throat pain +eyeglasses  Respiratory: Negative for cough, wheezing, shortness of breath   Cardiac: Negative for chest pain, dyspnea on exertion, palpitations     Gastrointestinal: Negative for nausea, vomiting, diarrhea, constipation, abdominal pain: + umbilical bulge from hernia, no discomfort    Genitourinary: Negative for dysuria, flank pain, frequency, hematuria   Musculoskeletal: Negative for decreased ROM, pain, swelling, weakness   Neurological: Negative for dizziness, confusion, headache  Psychiatric: Negative for mood changes   Skin: Negative for itching, rash, ulcer    Hematologic/Lymph: Negative for bruising, easy bleeding  Allergic/Immunologic: Negative itching, sneezing, swelling      Physical Exam  Constitutional:       Appearance: Normal appearance.   HENT:      Head: Normocephalic.      Mouth/Throat:      Mouth: Mucous membranes are moist.   Eyes:      Extraocular Movements:  Extraocular movements intact.   Cardiovascular:      Rate and Rhythm: Normal rate and regular rhythm.   Pulmonary:      Effort: Pulmonary effort is normal.      Breath sounds: Normal breath sounds.   Abdominal:      General: Abdomen is flat.      Palpations: Abdomen is soft.      Hernia: A hernia is present. Hernia is present in the umbilical area.   Musculoskeletal:         General: Normal range of motion.      Cervical back: Normal range of motion.   Skin:     General: Skin is warm and dry.   Neurological:      General: No focal deficit present.      Mental Status: He is alert.   Psychiatric:         Mood and Affect: Mood normal.          PAT AIRWAY:   Airway:     Mallampati::  II    Neck ROM::  Full   implants      Testing/Diagnostic:   Lab Results   Component Value Date    WBC 12.2 (H) 02/25/2025    HGB 12.9 (L) 02/25/2025    HCT 39.6 (L) 02/25/2025    MCV 84 02/25/2025     02/25/2025     Lab Results   Component Value Date    GLUCOSE 88 02/25/2025    CALCIUM 9.8 02/25/2025     02/25/2025    K 3.9 02/25/2025    CO2 28 02/25/2025     02/25/2025    BUN 12 02/25/2025    CREATININE 0.71 02/25/2025     Patient Specialist/PCP:   PCP: Dr. Hernandez  Onc/hem: Dr. Zavala    Visit Vitals  Smoking Status Never     *see vitals from PAT visit    DASI Risk Score      Flowsheet Row Pre-Admission Testing from 2/25/2025 in [facility]   Can you take care of yourself (eat, dress, bathe, or use toilet)?  2.75 filed at 02/25/2025 0936   Can you walk indoors, such as around your house? 1.75 filed at 02/25/2025 0936   Can you walk a block or two on level ground?  2.75 filed at 02/25/2025 0936   Can you climb a flight of stairs or walk up a hill? 5.5 filed at 02/25/2025 0936   Can you run a short distance? 8 filed at 02/25/2025 0936   Can you do light work around the house like dusting or washing dishes? 2.7 filed at 02/25/2025 0936   Can you do moderate work around the house like vacuuming, sweeping floors  or carrying groceries? 3.5 filed at 02/25/2025 0936   Can you do heavy work around the house like scrubbing floors or lifting and moving heavy furniture?  8 filed at 02/25/2025 0936   Can you do yard work like raking leaves, weeding or pushing a mower? 4.5 filed at 02/25/2025 0936   Can you have sexual relations? 5.25 filed at 02/25/2025 0936   Can you participate in moderate recreational activities like golf, bowling, dancing, doubles tennis or throwing a baseball or football? 6 filed at 02/25/2025 0936   Can you participate in strenous sports like swimming, singles tennis, football, basketball, or skiing? 7.5 filed at 02/25/2025 0936   DASI SCORE 58.2 filed at 02/25/2025 0936   METS Score (Will be calculated only when all the questions are answered) 9.9 filed at 02/25/2025 0936          Caprini DVT Assessment      Flowsheet Row Pre-Admission Testing from 2/25/2025 in [facility] [place]   DVT Score (IF A SCORE IS NOT CALCULATING, MUST SELECT A BMI TO COMPLETE) 4 filed at 02/25/2025 0935   BMI (BMI MUST BE CHOSEN) 31-40 (Obesity) filed at 02/25/2025 0935          Modified Frailty Index      Flowsheet Row Pre-Admission Testing from 2/25/2025 in [facility] [place]   Non-independent functional status (problems with dressing, bathing, personal grooming, or cooking) 0 filed at 02/25/2025 0936   History of diabetes mellitus  0 filed at 02/25/2025 0936   History of COPD 0 filed at 02/25/2025 0936   History of CHF No filed at 02/25/2025 0936   History of MI 0 filed at 02/25/2025 0936   History of Percutaneous Coronary Intervention, Cardiac Surgery, or Angina No filed at 02/25/2025 0936   Hypertension requiring the use of medication  0.0909 filed at 02/25/2025 0936   Peripheral vascular disease 0 filed at 02/25/2025 0936   Impaired sensorium (cognitive impairement or loss, clouding, or delirium) 0 filed at 02/25/2025 0936   TIA or CVA withouy residual deficit 0 filed at 02/25/2025 0936   Cerebrovascular  accident with deficit 0 filed at 02/25/2025 0936   Modified Frailty Index Calculator .0909 filed at 02/25/2025 0936          CHADS2 Stroke Risk  Current as of about an hour ago        N/A 3 to 100%: High Risk   2 to < 3%: Medium Risk   0 to < 2%: Low Risk     Last Change: N/A          This score determines the patient's risk of having a stroke if the patient has atrial fibrillation.        This score is not applicable to this patient. Components are not calculated.          Revised Cardiac Risk Index      Flowsheet Row Pre-Admission Testing from 2/25/2025 in [facility]   High-Risk Surgery (Intraperitoneal, Intrathoracic,Suprainguinal vascular) 0 filed at 02/25/2025 1007   History of ischemic heart disease (History of MI, History of positive exercuse test, Current chest paint considered due to myocardial ischemia, Use of nitrate therapy, ECG with pathological Q Waves) 0 filed at 02/25/2025 1007   History of congestive heart failure (pulmonary edemia, bilateral rales or S3 gallop, Paroxysmal nocturnal dyspnea, CXR showing pulmonary vascular redistribution) 0 filed at 02/25/2025 1007   History of cerebrovascular disease (Prior TIA or stroke) 0 filed at 02/25/2025 1007   Pre-operative insulin treatment 0 filed at 02/25/2025 1007   Pre-operative creatinine>2 mg/dl 0 filed at 02/25/2025 1007   Revised Cardiac Risk Calculator 0 filed at 02/25/2025 1007          Apfel Simplified Score      Flowsheet Row Pre-Admission Testing from 2/25/2025 in [facility]   Smoking status 1 filed at 02/25/2025 0936   History of motion sickness or PONV  0 filed at 02/25/2025 0936   Use of postoperative opioids 1 filed at 02/25/2025 0936   Gender - Female 0=No filed at 02/25/2025 0936   Apfel Simplified Score Calculator 2 filed at 02/25/2025 0936          Risk Analysis Index Results This Encounter         2/25/2025  0936             Do you live in a place other than your own home?: 0    When did you begin living  in the place you are currently residing?: Greater than one year ago    Any kidney failure, kidney not working well, or seeing a kidney doctor (nephrologist)? If yes, was this for kidney stones or another problem?: 0 No    Any history of chronic (long-term) congestive heart failure (CHF)?: 0 No    Any shortness of breath when resting?: 0 No    In the past five years, have you been diagnosed with or treated for cancer?: No    During the last 3 months has it become difficult for you to remember things or organize your thoughts?: 0 No    Have you lost weight of 10 pounds or more in the past 3 months without trying?: 0 No    Do you have any loss of appetitie?: 0 No    Getting Around (Mobility): 0 Can get around without help    Eatin Can plan and prepare own meals    Toiletin Can use toilet without any help    Personal Hygiene (Bathing, Hand Washing, Changing Clothes): 0 Can shower or bathe without any help    DU Cancer History: Patient does not indicate history of cancer    Total Risk Analysis Index Score Without Cancer: 17    Total Risk Analysis Index Score: 17          Stop Bang Score      Flowsheet Row Pre-Admission Testing from 2025 in [facility] [place]   Do you snore loudly? 1 filed at 2025   Do you often feel tired or fatigued after your sleep? 0 filed at 2025   Has anyone ever observed you stop breathing in your sleep? 0 filed at 2025   Do you have or are you being treated for high blood pressure? 0 filed at 2025   Recent BMI (Calculated) 36.7 filed at 2025   Is BMI greater than 35 kg/m2? 1=Yes filed at 2025   Age older than 50 years old? 1=Yes filed at 2025   Is your neck circumference greater than 17 inches (Male) or 16 inches (Female)? 0 filed at 2025   Gender - Male 1=Yes filed at 2025   STOP-BANG Total Score 4 filed at 2025          Prodigy: High Risk  Total Score: 8               Prodigy Gender Score          ARISCAT Score for Postoperative Pulmonary Complications      Flowsheet Row [facility] [place] [place] [facility]   Age Calculated Score 3 filed at 2025 1008   Preoperative SpO2 0 filed at 2025 1008   Respiratory infection in the last month Either upper or lower (i.e., URI, bronchitis, pneumonia), with fever and antibiotic treatment 0 filed at 2025 1008   Preoperative anemia (Hgb less than 10 g/dl) 0 filed at 2025 1008   Surgical incision  15 filed at 2025 1008   Duration of surgery  0 filed at 2025 1008   Emergency Procedure  0 filed at 2025 1008   ARISCAT Total Score  18 filed at 2025 1008          Joseluis Perioperative Risk for Myocardial Infarction or Cardiac Arrest (PATRICIO)    No data to display         Assessment and Plan:     Pre-Op  49 yo male scheduled for umbilical hernia repair on 3/7/2025 with Dr. Holley. Blood work and MRSA ordered- oral chlorahexidine prescribed. EKG shows NSR with LVH, v rate of 70 bpm- comparable tracing under Care Everywhere from .Otherwise no further orders indicated.     Cardiac  Hypertension, taking Norvasc and Lisinopril  Hyperlipidemia, taking Trilipix  LVH  DASI Score: 58.2   MET Score: 9.9  RCRI  0 which is 3.9% 30 day risk of MACE (risk for cardiac death, nonfatal myocardial infarction, and nonfactal cardiac arrest)  PATRICIO score which indicates a  0.01 % risk of intraoperative or 30-day postoperative MACE    Pulmonary  No diagnosis or significant findings on chart review or clinical presentation and evaluation.    Preoperative deep breathing educational handout provided to patient.  STOP BAN   points which is a intermediate risk for moderate to severe MICHAEL  ARISCAT:   18   points which is a low (1.6%) risk of in-hospital post-op pulmonary complications     Endocrine  BMI 37.61    GI  History of H-Pylori  Apfel: 2 points 39% risk for post operative  N/V    /Renal  Counseled on avoiding NSAIDs, adequate hydration    Hematologic  Chronic lymphocytic leukemia, taking Brukinsa, dx 4 year ago  -Follows with hematology/oncology    due to high Caprini score of 4 patient is at HIGH risk for perioperative DVT, informative education handout provided    Skin check: Patient was instructed to make surgeon aware of any skin changes/concerns prior to surgery.     Anesthesia:  Patient denies any anesthesia complications with oral surgery and EGD     See risk scores as previously documented.

## 2025-02-25 NOTE — PREPROCEDURE INSTRUCTIONS
Thank you for visiting Preadmission Testing at [facility]. If you have any changes to your health condition, please call the SURGEON's office to alert them and give them details of your symptoms.        Preoperative Brain Exercises    What are brain exercises?  A brain exercise is any activity that engages your thinking (cognitive) skills.    What types of activities are considered brain exercises?  Jigsaw puzzles, crossword puzzles, word jumble, memory games, word search, and many more.  Many can be found free online or on your phone via a mobile dino.    Why should I do brain exercises before my surgery?  More recent research has shown brain exercise before surgery can lower the risk of postoperative delirium (confusion) which can be especially important for older adults.  Patients who did brain exercises for 5 to 10 hours the days before surgery, cut their risk of postoperative delirium in half up to 1 week after surgery.      Preoperative Deep Breathing Exercises    Why it is important to do deep breathing exercises before my surgery?  Deep breathing exercises strengthen your breathing muscles.  This helps you to recover after your surgery and decreases the chance of breathing complications.    How are the deep breathing exercises done?  Sit straight with your back supported.  Breathe in deeply and slowly through your nose. Your lower rib cage should expand and your abdomen may move forward.  Hold that breath for 3 to 5 seconds.  Breathe out through pursed lips, slowly and completely.  Rest and repeat 10 times every hour while awake.  Rest longer if you become dizzy or lightheaded.      Patient and Family Education   Ways You Can Help Prevent Blood Clots     This handout explains some simple things you can do to help prevent blood clots.      Blood clots are blockages that can form in the body's veins. When a blood clot forms in your deep veins, it may be called a deep vein thrombosis, or DVT for short. Blood clots can  happen in any part of the body where blood flows, but they are most common in the arms and legs. If a piece of a blood clot breaks free and travels to the lungs, it is called a pulmonary embolus (PE). A PE can be a very serious problem.      Being in the hospital or having surgery can raise your chances of getting a blood clot because you may not be well enough to move around as much as you normally do.      Ways you can help prevent blood clots in the hospital         Wearing SCDs. SCDs stands for Sequential Compression Devices.   SCDs are special sleeves that wrap around your legs  They attach to a pump that fills them with air to gently squeeze your legs every few minutes.   This helps return the blood in your legs to your heart.   SCDs should only be taken off when walking or bathing.   SCDs may not be comfortable, but they can help save your life.               Wearing compression stockings - if your doctor orders them. These special snug fitting stockings gently squeeze your legs to help blood flow.       Walking. Walking helps move the blood in your legs.   If your doctor says it is ok, try walking the halls at least   5 times a day. Ask us to help you get up, so you don't fall.      Taking any blood thinning medicines your doctor orders.          ©[facility]; 3/23        Ways you can help prevent blood clots at home       Wearing compression stockings - if your doctor orders them. ? Walking - to help move the blood in your legs.       Taking any blood thinning medicines your doctor orders.      Signs of a blood clot or PE      Tell your doctor or nurse know right away if you have of the problems listed below.    If you are at home, seek medical care right away. Call 911 for chest pain or problems breathing.          Signs of a blood clot (DVT) - such as pain,  swelling, redness or warmth in your arm or leg      Signs of a pulmonary embolism (PE) - such as chest     pain or feeling short of breath

## 2025-02-27 LAB — STAPHYLOCOCCUS SPEC CULT: NORMAL

## 2025-03-06 ENCOUNTER — ANESTHESIA EVENT (OUTPATIENT)
Dept: OPERATING ROOM | Facility: HOSPITAL | Age: 50
End: 2025-03-06
Payer: COMMERCIAL

## 2025-03-07 ENCOUNTER — PHARMACY VISIT (OUTPATIENT)
Dept: PHARMACY | Facility: CLINIC | Age: 50
End: 2025-03-07
Payer: COMMERCIAL

## 2025-03-07 ENCOUNTER — ANESTHESIA (OUTPATIENT)
Dept: OPERATING ROOM | Facility: HOSPITAL | Age: 50
End: 2025-03-07
Payer: COMMERCIAL

## 2025-03-07 ENCOUNTER — HOSPITAL ENCOUNTER (OUTPATIENT)
Facility: HOSPITAL | Age: 50
Setting detail: OUTPATIENT SURGERY
Discharge: HOME | End: 2025-03-07
Attending: SURGERY | Admitting: SURGERY
Payer: COMMERCIAL

## 2025-03-07 ENCOUNTER — TELEPHONE (OUTPATIENT)
Dept: HEMATOLOGY/ONCOLOGY | Facility: CLINIC | Age: 50
End: 2025-03-07

## 2025-03-07 VITALS
OXYGEN SATURATION: 94 % | TEMPERATURE: 98.1 F | RESPIRATION RATE: 18 BRPM | WEIGHT: 250 LBS | HEART RATE: 83 BPM | BODY MASS INDEX: 35.79 KG/M2 | DIASTOLIC BLOOD PRESSURE: 71 MMHG | SYSTOLIC BLOOD PRESSURE: 153 MMHG | HEIGHT: 70 IN

## 2025-03-07 DIAGNOSIS — K42.9 UMBILICAL HERNIA WITHOUT OBSTRUCTION AND WITHOUT GANGRENE: Primary | ICD-10-CM

## 2025-03-07 DIAGNOSIS — G89.18 POST-OP PAIN: ICD-10-CM

## 2025-03-07 PROCEDURE — 7100000010 HC PHASE TWO TIME - EACH INCREMENTAL 1 MINUTE: Performed by: SURGERY

## 2025-03-07 PROCEDURE — 2500000005 HC RX 250 GENERAL PHARMACY W/O HCPCS: Performed by: NURSE ANESTHETIST, CERTIFIED REGISTERED

## 2025-03-07 PROCEDURE — 3600000003 HC OR TIME - INITIAL BASE CHARGE - PROCEDURE LEVEL THREE: Performed by: SURGERY

## 2025-03-07 PROCEDURE — 2780000003 HC OR 278 NO HCPCS: Performed by: SURGERY

## 2025-03-07 PROCEDURE — 3700000001 HC GENERAL ANESTHESIA TIME - INITIAL BASE CHARGE: Performed by: SURGERY

## 2025-03-07 PROCEDURE — 2500000004 HC RX 250 GENERAL PHARMACY W/ HCPCS (ALT 636 FOR OP/ED): Performed by: NURSE ANESTHETIST, CERTIFIED REGISTERED

## 2025-03-07 PROCEDURE — 2500000004 HC RX 250 GENERAL PHARMACY W/ HCPCS (ALT 636 FOR OP/ED): Mod: JZ | Performed by: ANESTHESIOLOGY

## 2025-03-07 PROCEDURE — 3700000002 HC GENERAL ANESTHESIA TIME - EACH INCREMENTAL 1 MINUTE: Performed by: SURGERY

## 2025-03-07 PROCEDURE — 7100000009 HC PHASE TWO TIME - INITIAL BASE CHARGE: Performed by: SURGERY

## 2025-03-07 PROCEDURE — RXMED WILLOW AMBULATORY MEDICATION CHARGE

## 2025-03-07 PROCEDURE — C1781 MESH (IMPLANTABLE): HCPCS | Performed by: SURGERY

## 2025-03-07 PROCEDURE — 7100000002 HC RECOVERY ROOM TIME - EACH INCREMENTAL 1 MINUTE: Performed by: SURGERY

## 2025-03-07 PROCEDURE — 7100000001 HC RECOVERY ROOM TIME - INITIAL BASE CHARGE: Performed by: SURGERY

## 2025-03-07 PROCEDURE — 49594 RPR AA HRN 1ST 3-10 NCR/STRN: CPT | Performed by: SURGERY

## 2025-03-07 PROCEDURE — 2720000007 HC OR 272 NO HCPCS: Performed by: SURGERY

## 2025-03-07 PROCEDURE — 3600000008 HC OR TIME - EACH INCREMENTAL 1 MINUTE - PROCEDURE LEVEL THREE: Performed by: SURGERY

## 2025-03-07 PROCEDURE — 99223 1ST HOSP IP/OBS HIGH 75: CPT | Performed by: SURGERY

## 2025-03-07 PROCEDURE — 2500000005 HC RX 250 GENERAL PHARMACY W/O HCPCS: Performed by: SURGERY

## 2025-03-07 DEVICE — VENTRALEX ST HERNIA PATCH, 6.4 CM (2.5"), CIRCLE
Type: IMPLANTABLE DEVICE | Site: ABDOMEN | Status: FUNCTIONAL
Brand: VENTRALEX

## 2025-03-07 RX ORDER — LABETALOL HYDROCHLORIDE 5 MG/ML
5 INJECTION, SOLUTION INTRAVENOUS ONCE AS NEEDED
Status: DISCONTINUED | OUTPATIENT
Start: 2025-03-07 | End: 2025-03-07 | Stop reason: HOSPADM

## 2025-03-07 RX ORDER — LIDOCAINE HYDROCHLORIDE 10 MG/ML
0.1 INJECTION, SOLUTION INFILTRATION; PERINEURAL ONCE
Status: DISCONTINUED | OUTPATIENT
Start: 2025-03-07 | End: 2025-03-07 | Stop reason: HOSPADM

## 2025-03-07 RX ORDER — FENTANYL CITRATE 50 UG/ML
INJECTION, SOLUTION INTRAMUSCULAR; INTRAVENOUS AS NEEDED
Status: DISCONTINUED | OUTPATIENT
Start: 2025-03-07 | End: 2025-03-07

## 2025-03-07 RX ORDER — ACETAMINOPHEN 325 MG/1
975 TABLET ORAL ONCE
Status: DISCONTINUED | OUTPATIENT
Start: 2025-03-07 | End: 2025-03-07 | Stop reason: HOSPADM

## 2025-03-07 RX ORDER — OXYCODONE HYDROCHLORIDE 5 MG/1
5 TABLET ORAL EVERY 6 HOURS PRN
Qty: 5 TABLET | Refills: 0 | Status: SHIPPED | OUTPATIENT
Start: 2025-03-07

## 2025-03-07 RX ORDER — DIPHENHYDRAMINE HYDROCHLORIDE 50 MG/ML
12.5 INJECTION INTRAMUSCULAR; INTRAVENOUS ONCE AS NEEDED
Status: DISCONTINUED | OUTPATIENT
Start: 2025-03-07 | End: 2025-03-07 | Stop reason: HOSPADM

## 2025-03-07 RX ORDER — ALBUTEROL SULFATE 0.83 MG/ML
2.5 SOLUTION RESPIRATORY (INHALATION) ONCE AS NEEDED
Status: DISCONTINUED | OUTPATIENT
Start: 2025-03-07 | End: 2025-03-07 | Stop reason: HOSPADM

## 2025-03-07 RX ORDER — ROCURONIUM BROMIDE 50 MG/5 ML
SYRINGE (ML) INTRAVENOUS AS NEEDED
Status: DISCONTINUED | OUTPATIENT
Start: 2025-03-07 | End: 2025-03-07

## 2025-03-07 RX ORDER — MIDAZOLAM HYDROCHLORIDE 1 MG/ML
INJECTION, SOLUTION INTRAMUSCULAR; INTRAVENOUS AS NEEDED
Status: DISCONTINUED | OUTPATIENT
Start: 2025-03-07 | End: 2025-03-07

## 2025-03-07 RX ORDER — HYDROMORPHONE HYDROCHLORIDE 0.2 MG/ML
0.2 INJECTION INTRAMUSCULAR; INTRAVENOUS; SUBCUTANEOUS EVERY 5 MIN PRN
Status: DISCONTINUED | OUTPATIENT
Start: 2025-03-07 | End: 2025-03-07 | Stop reason: HOSPADM

## 2025-03-07 RX ORDER — OXYCODONE HYDROCHLORIDE 5 MG/1
5 TABLET ORAL EVERY 4 HOURS PRN
Status: DISCONTINUED | OUTPATIENT
Start: 2025-03-07 | End: 2025-03-07 | Stop reason: HOSPADM

## 2025-03-07 RX ORDER — PROPOFOL 10 MG/ML
INJECTION, EMULSION INTRAVENOUS AS NEEDED
Status: DISCONTINUED | OUTPATIENT
Start: 2025-03-07 | End: 2025-03-07

## 2025-03-07 RX ORDER — ONDANSETRON HYDROCHLORIDE 2 MG/ML
INJECTION, SOLUTION INTRAVENOUS AS NEEDED
Status: DISCONTINUED | OUTPATIENT
Start: 2025-03-07 | End: 2025-03-07

## 2025-03-07 RX ORDER — GLYCOPYRROLATE 0.2 MG/ML
INJECTION INTRAMUSCULAR; INTRAVENOUS AS NEEDED
Status: DISCONTINUED | OUTPATIENT
Start: 2025-03-07 | End: 2025-03-07

## 2025-03-07 RX ORDER — HYDRALAZINE HYDROCHLORIDE 20 MG/ML
5 INJECTION INTRAMUSCULAR; INTRAVENOUS EVERY 30 MIN PRN
Status: DISCONTINUED | OUTPATIENT
Start: 2025-03-07 | End: 2025-03-07 | Stop reason: HOSPADM

## 2025-03-07 RX ORDER — BUPIVACAINE HCL/EPINEPHRINE 0.5-1:200K
VIAL (ML) INJECTION AS NEEDED
Status: DISCONTINUED | OUTPATIENT
Start: 2025-03-07 | End: 2025-03-07 | Stop reason: HOSPADM

## 2025-03-07 RX ORDER — DOCUSATE SODIUM 100 MG/1
200 CAPSULE, LIQUID FILLED ORAL DAILY
Qty: 30 CAPSULE | Refills: 0 | Status: SHIPPED | OUTPATIENT
Start: 2025-03-07 | End: 2025-03-22

## 2025-03-07 RX ORDER — LABETALOL HYDROCHLORIDE 5 MG/ML
INJECTION, SOLUTION INTRAVENOUS AS NEEDED
Status: DISCONTINUED | OUTPATIENT
Start: 2025-03-07 | End: 2025-03-07

## 2025-03-07 RX ORDER — SODIUM CHLORIDE, SODIUM LACTATE, POTASSIUM CHLORIDE, CALCIUM CHLORIDE 600; 310; 30; 20 MG/100ML; MG/100ML; MG/100ML; MG/100ML
100 INJECTION, SOLUTION INTRAVENOUS CONTINUOUS
Status: DISCONTINUED | OUTPATIENT
Start: 2025-03-07 | End: 2025-03-07 | Stop reason: HOSPADM

## 2025-03-07 RX ORDER — SODIUM CHLORIDE, SODIUM LACTATE, POTASSIUM CHLORIDE, CALCIUM CHLORIDE 600; 310; 30; 20 MG/100ML; MG/100ML; MG/100ML; MG/100ML
INJECTION, SOLUTION INTRAVENOUS CONTINUOUS PRN
Status: DISCONTINUED | OUTPATIENT
Start: 2025-03-07 | End: 2025-03-07

## 2025-03-07 RX ORDER — LIDOCAINE HYDROCHLORIDE 20 MG/ML
INJECTION, SOLUTION EPIDURAL; INFILTRATION; INTRACAUDAL; PERINEURAL AS NEEDED
Status: DISCONTINUED | OUTPATIENT
Start: 2025-03-07 | End: 2025-03-07

## 2025-03-07 RX ORDER — OXYCODONE AND ACETAMINOPHEN 5; 325 MG/1; MG/1
1 TABLET ORAL EVERY 4 HOURS PRN
Status: DISCONTINUED | OUTPATIENT
Start: 2025-03-07 | End: 2025-03-07 | Stop reason: HOSPADM

## 2025-03-07 RX ORDER — NEOSTIGMINE METHYLSULFATE 1 MG/ML
INJECTION INTRAVENOUS AS NEEDED
Status: DISCONTINUED | OUTPATIENT
Start: 2025-03-07 | End: 2025-03-07

## 2025-03-07 RX ORDER — ONDANSETRON HYDROCHLORIDE 2 MG/ML
4 INJECTION, SOLUTION INTRAVENOUS ONCE AS NEEDED
Status: DISCONTINUED | OUTPATIENT
Start: 2025-03-07 | End: 2025-03-07 | Stop reason: HOSPADM

## 2025-03-07 RX ADMIN — HYDROMORPHONE HYDROCHLORIDE 0.5 MG: 1 INJECTION, SOLUTION INTRAMUSCULAR; INTRAVENOUS; SUBCUTANEOUS at 11:37

## 2025-03-07 RX ADMIN — Medication 50 MG: at 10:10

## 2025-03-07 RX ADMIN — PROPOFOL 70 MG: 10 INJECTION, EMULSION INTRAVENOUS at 11:10

## 2025-03-07 RX ADMIN — FENTANYL CITRATE 50 MCG: 50 INJECTION, SOLUTION INTRAMUSCULAR; INTRAVENOUS at 10:24

## 2025-03-07 RX ADMIN — DEXAMETHASONE SODIUM PHOSPHATE 4 MG: 4 INJECTION INTRA-ARTICULAR; INTRALESIONAL; INTRAMUSCULAR; INTRAVENOUS; SOFT TISSUE at 10:14

## 2025-03-07 RX ADMIN — LIDOCAINE HYDROCHLORIDE 100 MG: 20 INJECTION, SOLUTION EPIDURAL; INFILTRATION; INTRACAUDAL; PERINEURAL at 10:09

## 2025-03-07 RX ADMIN — PROPOFOL 200 MG: 10 INJECTION, EMULSION INTRAVENOUS at 10:09

## 2025-03-07 RX ADMIN — SODIUM CHLORIDE, POTASSIUM CHLORIDE, SODIUM LACTATE AND CALCIUM CHLORIDE: 600; 310; 30; 20 INJECTION, SOLUTION INTRAVENOUS at 10:05

## 2025-03-07 RX ADMIN — FENTANYL CITRATE 50 MCG: 50 INJECTION, SOLUTION INTRAMUSCULAR; INTRAVENOUS at 10:09

## 2025-03-07 RX ADMIN — ONDANSETRON 4 MG: 2 INJECTION, SOLUTION INTRAMUSCULAR; INTRAVENOUS at 10:14

## 2025-03-07 RX ADMIN — Medication 20 MG: at 10:47

## 2025-03-07 RX ADMIN — GLYCOPYRROLATE 0.8 MG: 0.2 INJECTION, SOLUTION INTRAMUSCULAR; INTRAVENOUS at 11:08

## 2025-03-07 RX ADMIN — MIDAZOLAM 2 MG: 1 INJECTION INTRAMUSCULAR; INTRAVENOUS at 10:05

## 2025-03-07 RX ADMIN — LABETALOL HYDROCHLORIDE 10 MG: 5 INJECTION, SOLUTION INTRAVENOUS at 10:52

## 2025-03-07 RX ADMIN — NEOSTIGMINE METHYLSULFATE 5 MG: 1 INJECTION INTRAVENOUS at 11:08

## 2025-03-07 SDOH — HEALTH STABILITY: MENTAL HEALTH: CURRENT SMOKER: 0

## 2025-03-07 ASSESSMENT — PAIN SCALES - GENERAL
PAINLEVEL_OUTOF10: 0 - NO PAIN
PAINLEVEL_OUTOF10: 2
PAINLEVEL_OUTOF10: 0 - NO PAIN
PAINLEVEL_OUTOF10: 2
PAINLEVEL_OUTOF10: 5 - MODERATE PAIN
PAINLEVEL_OUTOF10: 6

## 2025-03-07 ASSESSMENT — COLUMBIA-SUICIDE SEVERITY RATING SCALE - C-SSRS
1. IN THE PAST MONTH, HAVE YOU WISHED YOU WERE DEAD OR WISHED YOU COULD GO TO SLEEP AND NOT WAKE UP?: NO
6. HAVE YOU EVER DONE ANYTHING, STARTED TO DO ANYTHING, OR PREPARED TO DO ANYTHING TO END YOUR LIFE?: NO
2. HAVE YOU ACTUALLY HAD ANY THOUGHTS OF KILLING YOURSELF?: NO

## 2025-03-07 ASSESSMENT — PAIN - FUNCTIONAL ASSESSMENT
PAIN_FUNCTIONAL_ASSESSMENT: 0-10

## 2025-03-07 NOTE — DISCHARGE INSTRUCTIONS
1. Any questions regarding your surgery or procedure are always welcomed at my office, [phone]. Any questions about caring for the wound, convalescence, nausea, pain medication, intestinal function, or any other aspect of surgery should be directed to my office. I will provide any prescriptions for pain medication, antibiotics or any other procedure related needs. If there is skin glue over incisions, it will fall off in 1-2 weeks. If there is dressing you can remove the wound dressing on the second day after the surgery. After removing the dressing, the wound doesn't need to be recovered. Again, the wound may be open to the air. If keeping the wound covered makes you more comfortable, then the dressing should be changed daily. Antibiotic creams or ointments are not required. If you feel that Neosporin or similar product would be helpful, then it is okay to use them. If there is any question about the potential of an infection, then call my office. You can get the wound wet when the dressing is removed. A short shower or bath for the first week is allowed. I wouldn't soak in a hot tub or take a greater than 10 minute bath for the first week.  2. Any questions about your regular medications for blood pressure, diabetes, heart or breathing problems, cholesterol, arthritis or other baseline health issue, should be directed to your family doctor. I cannot provide prescription refills for these medications.  I will always try to review medications with you so there is no confusion.   3. In the long term, you can eat whatever you like. However for the first 24 to 48 hours after anesthesia, you should eat lightly. Two or three small meals is preferred over one big meal. Easy to digest items (liquids, soda, soup, toast, broth, jello) are suggested. A loss of appetite or even nausea is extremely common after any anesthesia. Avoid the Thanksgiving feast, pepperoni pizza and spicy foods for those first several days.   Pain pills, inactivity and convalescence can lead to constipation. I suggest taking Metamucil or other bulk fiber product to avoid this. Constipation should resolve as you get back to regular eating, regular activity and your regular schedule. If the Metamucil doesn't work and several days pass without a bowel movement, then a small dose of Milk of Magnesia (1-2 tablespoons) can be tried. Be careful about taking a second dose of MOM; you can rebound with significant loose stools.  4. I need to see you in the office at your scheduled follow up. At that time, I will check the wounds, review any X-ray or pathology reports, and make arrangements for any other needs.  5. Any paperwork relevant to the surgery such as FMLA, disability, insurance, estimates for return to work, etc., should be brought to my office to be filled out at the post-operative visit.  6. There is a prescription for pain medication. . For the first several days, I would encourage you to take the pain medicine regularly, even if you don't hurt a lot. Keeping a constant level of pain medicine in your system works much better than trying to catch up. After several days, you can reduce the amount of pain medicine as needed. You can take over the counter Tylenol or Ibuprofen (Motrin) in addition to the prescription pain pills. The combination of the pain pills and the Tylenol /Motrin works much better than either alone.  Don't drive while taking the narcotics. Also, don't drink alcoholic beverages while taking the pain pills.  7. If you have chest pain or shortness of breath go the emergency room. If there are questions about the wound, pain medication, swelling or redness, call the office first. Most issues can be solved without an ER visit. Call [phone].

## 2025-03-07 NOTE — OP NOTE
REPAIR, HERNIA, UMBILICAL Operative Note     Date: 3/7/2025  OR Location: [facility] OR    Name: Brandon Snider, : 1975, Age: 50 y.o., MRN: [id number], Sex: male    Diagnosis  Pre-op Diagnosis      * Umbilical hernia without obstruction and without gangrene [K42.9] Post-op Diagnosis     * Umbilical hernia without obstruction and without gangrene [K42.9]     Procedures  REPAIR, HERNIA, UMBILICAL  25107 - CT RPR AA HERNIA RECR 3-10 CM REDUCIBLE      Surgeons      * Vidhya Holley - Primary    Resident/Fellow/Other Assistant:  Surgeons and Role:  * No surgeons found with a matching role *    Staff:   Surgical Assistant:   Circulator: Julia  Circulator: Jocelyn Esposito Person: Aydin  Scrub Person: Steffi  Scrub Person: David    Anesthesia Staff: Anesthesiologist: Paul Peterson MD  CRNA: REEMA Watson-CRNA  SRNA: Zoila Hernandez    Procedure Summary  Anesthesia: General  ASA: II  Estimated Blood Loss: 1mL  Intra-op Medications:   Administrations occurring from 1045 to 1205 on 25:   Medication Name Total Dose   BUPivacaine-EPINEPHrine (Marcaine w/EPI) 0.5 %-1:200,000 injection 10 mL   HYDROmorphone (Dilaudid) injection 0.5 mg 0.5 mg   glycopyrrolate (Robinul) injection 0.8 mg   labetalol (Normodyne,Trandate) injection 10 mg   neostigmine (Bloxiverz) 10 mg/10 mL injection 5 mg   propofol (Diprivan) injection 10 mg/mL 70 mg   rocuronium (Zemuron) 50 mg/5 mL prefilled syringe 20 mg              Anesthesia Record               Intraprocedure I/O Totals       None           Specimen: No specimens collected              Drains and/or Catheters: * None in log *    Tourniquet Times:         Implants:  Implants       Type Name Action Serial No.      Mesh PATCH, HERNIA, VENTRALEX ST, MEDIUM, 2.5 X 2.5 - [id number] Implanted               Findings: 3 cm umbilical fascial defect repaired using 6.3 cm Bard mesh.    Indications: Brandon Snider is an 50 y.o. male who is having surgery for Umbilical hernia  without obstruction and without gangrene [K42.9].     The reasons for, benefits to, and risks of the operation were discussed.  The risks include, but are not limited to, bleeding, infection, recurrence.  The patient appeared to understand and consented for the operation.         OPERATION:   The patient was taken to the operating room and placed supine on the operating table.  All pressure points were padded, and the patient was secured to the operating room table.  Time-out was performed. General anesthesia was  induced. Abdomen was clipped, prepped, and draped in the usual sterile fashion. We made a curvilinear infraumbilical incision and carried our dissection down to the abdominal wall  fascia.  The umbilical stalk was  from the fascia. There is  incarcerated preperitoneal fat and omentum present in the hernia sac.  It was removed using electrocautery. The  defect measured ~3cm at umbilicus and I elected to repair the hernia with a 6.3 cm round Bard mesh and interrupted #1 Prolene stitches in North, South, East, and West positions.  Next, fascial defect was repaired primarily using #1 Prolene in a figure-of-eight stitch x4. We irrigated the wound with normal saline.  We then attatched the umbilical stalk using an interrupted 2-0 Vicryl stitch. 0.5% Marcaine with Epinephrine was instilled in the fascia, subQ tissue, and skin. I lined up the dermis with running  3-0 Vicryl sutures. We then closed the skin with running subcuticular 4-0 Monocryl suture. Dermabond was applied over top for dressing.   Sponge, instrument, and needle counts were correct x2.  I was present for the entirety of the case.    Complications:  None; patient tolerated the procedure well.    Disposition: PACU - hemodynamically stable.  Condition: stable         Task Performed by RNFA or Surgical Assistant:  Retraction          Additional Details: Patient may shower tomorrow.    Attending Attestation: I was present and scrubbed for the  entire procedure.    Vidhya Holley  Phone Number: [phone]

## 2025-03-07 NOTE — ANESTHESIA POSTPROCEDURE EVALUATION
Patient: Brandon Snider    Procedure Summary       Date: 03/07/25 Room / Location: [facility] / [facility]    Anesthesia Start: 1005 Anesthesia Stop: 1124    Procedure: REPAIR, HERNIA, UMBILICAL (Abdomen) Diagnosis:       Umbilical hernia without obstruction and without gangrene      (Umbilical hernia without obstruction and without gangrene [K42.9])    Surgeons: Vidhya Holley MD Responsible Provider: Paul Peterson MD    Anesthesia Type: general ASA Status: 2            Anesthesia Type: general    Vitals Value Taken Time   /74 03/07/25 1210   Temp 36.6 °C (97.9 °F) 03/07/25 1210   Pulse 70 03/07/25 1211   Resp 33 03/07/25 1211   SpO2 91 % 03/07/25 1211   Vitals shown include unfiled device data.    Anesthesia Post Evaluation    Patient location during evaluation: PACU  Patient participation: complete - patient participated  Level of consciousness: awake and alert  Pain management: satisfactory to patient  Multimodal analgesia pain management approach  Airway patency: patent  Two or more strategies used to mitigate risk of obstructive sleep apnea  Cardiovascular status: acceptable  Respiratory status: acceptable  Hydration status: acceptable  Postoperative Nausea and Vomiting: none        There were no known notable events for this encounter.

## 2025-03-07 NOTE — H&P
"History and Physical     Referring Provider: Dr. Bernard Hernandez MD     Chief Complaint: umbilical hernia     History of Present Illness:  This is a 49 y.o.-year-old male who presents with umbilical hernia. He states he first noticed a small bulge about 1 month ago. He denies any inciting event or injury or feeling a pop. He states it only hurts when he coughs. He does not have any other abdominal pain at this time.  Of note, the patient is on zanubrutinib for his CLL and sees Dr. Zavala. He is not sure how long he will be on this medication.      Past Medical History:  Anxiety, CLL, HLD, HTN, fatty liver, H pylori     Past Surgical History:  [place] teeth extraction, EGD     Medications:  As per patient medical records     Allergies:  Hydrochlorothiazide - may have caused pancreatitis     Family History:  The information was reviewed and no pertinent findings are relevant to the presenting problem.     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 Denies nicotine use, rarely EtOH use, or IDU.     Review of Systems  A complete 10 point review of systems was performed and is negative except as noted in the history of present illness.     Physical Exam:  BP (!) 142/98   Pulse 84   Temp 36.5 °C (97.7 °F) (Temporal)   Resp 18   Ht 1.778 m (5' 10\")   Wt 113 kg (250 lb)   SpO2 95%   BMI 35.87 kg/m²     General: No acute distress.  Neuro: Alert and oriented ×3. Follows commands.  Head: Atraumatic  Eyes: Extraocular motions intact.  Ears: Hears normal speaking voice.  Mouth, Nose, Throat: Mucous membranes moist.  Normal dentition.  Neck: Supple. No appreciable masses.  Breast: Not examined.  Chest: Nonlabored breathing, bilateral chest rise.  Heart: Regular rate and rhythm.  Lung: Clear to auscultation bilaterally.  Abdomen: Soft. Nondistended. Nontender.  There is an umbilical hernia that is approximately 2 cm.  Rectal: Not examined.  Genitourinary: Not " examined.  Musculoskeletal: Moves all extremities.  Normal range of motion.  Skin: No rashes or lesions.  Psychological: Normal affect     Labs:  Labs from 1/2/2025 reviewed and are within normal limits for the patient given his history of CLL, there is leukocytosis that is improving and mild anemia.     Imaging: I have personally reviewed the images and the radiologist's report.  MRI abdomen 2/1/24 - hepatic steatosis, portal caval and hepatic adenopathy probably reactive, 5mm probably benign pancreatic cyst  US gallbladder 11/24/23 - biliary sludge      Assessment:  This is a 49 y.o.-year-old male who presents with an umbilical hernia that he would like repaired. We talked about the surgery, recovery, and restrictions afterwards, all his questions were answered.     The risks benefits and indications for surgery were reviewed with the patient.  The potential of bleeding, infection, myocardial infarction, pulmonary embolism were reviewed.  The use of mesh or prosthetic materials were reviewed with the patient.  Anticipated convalescence was reviewed with the patient.  All questions were answered and consent was obtained.     Plan:  -- We will plan for a possible primary vs umbilical hernia repair with mesh on 3/7/2025, with follow-up on 3/17/2025.  -- I have asked the patient to discuss with Dr. Zavala at his appointment later this month about the upcoming surgery and if any changes related to his regimen are needed, please let me know.  -- We will plan for surgery to be performed as an outpatient.  -- We will obtain Preadmission Testing (PAT).  -- We will apply Dermabond, so the patient may shower the day after surgery.  No swimming or tub soaks for 2 weeks post-operatively.  -- No heavy lifting >20lbs for a total of 6 weeks after surgery.            Vidhya Holley MD MPH   General Surgery  Office: [phone]  Fax: [phone]

## 2025-03-07 NOTE — TELEPHONE ENCOUNTER
Spoke with the patient and Jennie. Pt states he had all the information about his Brukinsa and confirmed it. Pt/Jennie had no further questions or concerns at this time.

## 2025-03-07 NOTE — ANESTHESIA PREPROCEDURE EVALUATION
Patient: Brandon Snider    Procedure Information       Anesthesia Start Date/Time: 03/07/25 1005    Procedure: REPAIR, HERNIA, UMBILICAL    Location: [facility] / [facility]    Surgeons: Vidhya Holley MD            Relevant Problems   Cardiac   (+) Hypercholesterolemia   (+) Primary hypertension      Neuro   (+) Generalized anxiety disorder      Liver   (+) Elevated LFTs   (+) Fatty liver   (+) Idiopathic acute pancreatitis without infection or necrosis (HHS-HCC)      Endocrine   (+) Class 2 severe obesity due to excess calories with serious comorbidity and body mass index (BMI) of 38.0 to 38.9 in adult      Hematology   (+) CLL (chronic lymphocytic leukemia) (Multi)      ID   (+) Acute upper respiratory infection   (+) Flu   (+) H. pylori infection       Clinical information reviewed:   Tobacco  Allergies  Meds   Med Hx  Surg Hx   Fam Hx  Soc Hx        NPO Detail:  NPO/Void Status  Carbohydrate Drink Given Prior to Surgery? : N  Date of Last Liquid: 03/06/25  Time of Last Liquid: 2200  Date of Last Solid: 03/06/25  Time of Last Solid: 2200  Last Intake Type: Food  Time of Last Void: 0918         Physical Exam    Airway  Mallampati: II  TM distance: >3 FB  Neck ROM: full     Cardiovascular   Rhythm: regular  Rate: normal     Dental - normal exam     Pulmonary   Breath sounds clear to auscultation     Abdominal            Anesthesia Plan    History of general anesthesia?: yes  History of complications of general anesthesia?: no    ASA 2     general     The patient is not a current smoker.    intravenous induction   Anesthetic plan and risks discussed with patient.    Plan discussed with CRNA.

## 2025-03-07 NOTE — ANESTHESIA PROCEDURE NOTES
Airway  Date/Time: 3/7/2025 10:12 AM  Urgency: elective    Airway not difficult    Staffing  Performed: SRNA   Authorized by: Paul Peterson MD    Performed by: REEMA Watson-CRNA  Patient location during procedure: OR    Indications and Patient Condition  Indications for airway management: anesthesia  Spontaneous Ventilation: absent  Sedation level: deep  Preoxygenated: yes  Patient position: sniffing  Mask difficulty assessment: 1 - vent by mask    Final Airway Details  Final airway type: endotracheal airway      Successful airway: ETT     Successful intubation technique: direct laryngoscopy  Facilitating devices/methods: intubating stylet  Blade: Farzad  Blade size: #4  ETT size (mm): 7.5  Cormack-Lehane Classification: grade I - full view of glottis  Placement verified by: capnometry   Measured from: lips  ETT to lips (cm): 23  Number of attempts at approach: 1  Number of other approaches attempted: 0    Additional Comments  Lips and teeth remain in preanesthetic condition.

## 2025-03-12 ENCOUNTER — APPOINTMENT (OUTPATIENT)
Dept: GASTROENTEROLOGY | Facility: CLINIC | Age: 50
End: 2025-03-12
Payer: COMMERCIAL

## 2025-03-12 VITALS
OXYGEN SATURATION: 96 % | SYSTOLIC BLOOD PRESSURE: 124 MMHG | DIASTOLIC BLOOD PRESSURE: 85 MMHG | HEIGHT: 70 IN | WEIGHT: 254 LBS | BODY MASS INDEX: 36.36 KG/M2 | HEART RATE: 81 BPM

## 2025-03-12 DIAGNOSIS — C91.10 CLL (CHRONIC LYMPHOCYTIC LEUKEMIA) (MULTI): ICD-10-CM

## 2025-03-12 DIAGNOSIS — K86.2 PANCREATIC CYST (HHS-HCC): ICD-10-CM

## 2025-03-12 DIAGNOSIS — R79.89 ELEVATED LFTS: ICD-10-CM

## 2025-03-12 DIAGNOSIS — K85.00 IDIOPATHIC ACUTE PANCREATITIS WITHOUT INFECTION OR NECROSIS (HHS-HCC): ICD-10-CM

## 2025-03-12 DIAGNOSIS — K76.0 FATTY LIVER: ICD-10-CM

## 2025-03-12 DIAGNOSIS — K76.0 HEPATIC STEATOSIS: Primary | ICD-10-CM

## 2025-03-12 DIAGNOSIS — A04.8 H. PYLORI INFECTION: ICD-10-CM

## 2025-03-12 PROCEDURE — G2211 COMPLEX E/M VISIT ADD ON: HCPCS | Performed by: STUDENT IN AN ORGANIZED HEALTH CARE EDUCATION/TRAINING PROGRAM

## 2025-03-12 PROCEDURE — 99215 OFFICE O/P EST HI 40 MIN: CPT | Performed by: STUDENT IN AN ORGANIZED HEALTH CARE EDUCATION/TRAINING PROGRAM

## 2025-03-12 PROCEDURE — 1036F TOBACCO NON-USER: CPT | Performed by: STUDENT IN AN ORGANIZED HEALTH CARE EDUCATION/TRAINING PROGRAM

## 2025-03-12 PROCEDURE — 3074F SYST BP LT 130 MM HG: CPT | Performed by: STUDENT IN AN ORGANIZED HEALTH CARE EDUCATION/TRAINING PROGRAM

## 2025-03-12 PROCEDURE — 3008F BODY MASS INDEX DOCD: CPT | Performed by: STUDENT IN AN ORGANIZED HEALTH CARE EDUCATION/TRAINING PROGRAM

## 2025-03-12 PROCEDURE — 3079F DIAST BP 80-89 MM HG: CPT | Performed by: STUDENT IN AN ORGANIZED HEALTH CARE EDUCATION/TRAINING PROGRAM

## 2025-03-12 NOTE — PATIENT INSTRUCTIONS
-schedule MR for evaluation of pancreas and liver  -will consider Rezdiffra which is a new medication for fatty liver, non-alcoholic   -RTC in four months

## 2025-03-12 NOTE — PROGRESS NOTES
Subjective     History of Present Illness:   Brandon Snider is a 50 y.o. male with hx of CLL and obesity who presents for follow up from recent hospital admission in 11/2023 for idiopathic pancreatitis attributed to possible hydrochlorothiazide that was discontinued on discharge. Since our last visit he completed treatment for Hpylori and documented negative re-testing. Fibroscan showing F2 fibrosis.   He has been doing well for the past six months.  He denies any recurrence of abdominal pain, nausea, vomiting.  He has no weight loss.  He feels overall well.    Was due for surveillance MRI in summer of 2024 for evaluation of pancreas cyst and atypical liver hemangioma, this has not been completed as it was ?too early to schedule      With regards to his hx,  Patient states he was experiencing prodromal  symptoms as if he was coming down with a cold along with diarrhea, abdominal pain two weeks prior to presentation to the ED. He states he though he was experiencing issues with an abdominal hernia. He was diagnosed with acute pancreatitis based on elevated Lipase to 200s. Patient states he was experiencing pain in his lower abdomen that was cramping in nature. He had no radiation. Diarrhea resolved during admission. He had no nausea or emesis. Has not had symptoms similar to this in the past.     Review of CT scan suggests gastric and duodenal thickening as well as ?mild ascites. + hepatic steatosis. Repeat attempt at paracentesis was not successful due to insuffiencey fluid.    Review of labs showing elevated ALT to 74.     Patient states he does not smoke. He drinks 1-2 beers a month at most. No family hx of pancreatic malignancy or pancreatic disease.     Today, he states he feels at his baseline. No abdominal pain. He has 1-2 soft bowel movements daily. He has not had a prior colonoscopy. Completed cologuard two years ago that was negative per patient.         Past Medical History   has a past medical history  of CLL (chronic lymphocytic leukemia) (Multi), Encounter for general adult medical examination without abnormal findings (11/05/2019), Hyperlipidemia, Hypertension, Obesity, unspecified (11/05/2019), Other specified abnormal findings of blood chemistry (04/29/2021), Other specified counseling (12/14/2020), Personal history of diseases of the blood and blood-forming organs and certain disorders involving the immune mechanism (04/29/2021), and Sinobronchitis (01/27/2023).     Social History   reports that he has never smoked. He has never used smokeless tobacco. He reports current alcohol use. He reports that he does not use drugs.     Family History  family history includes Esophageal cancer in his mother; Hypertension in his mother; Prostate cancer in his father, paternal grandfather, and another family member; cardiac disorder in his father; elevated liver enzymes in his father.     Allergies  Allergies   Allergen Reactions    Hydrochlorothiazide Other     May have caused pancreatitis 11/2023.       Medications  Current Outpatient Medications   Medication Instructions    amLODIPine (NORVASC) 10 mg, oral, Daily    Brukinsa 160 mg, oral, 2 times daily, Swallow whole    chlorhexidine (Peridex) 0.12 % solution Swish and spit 15 ml for 2 doses, 15mL the night before surgery and 15 ml morning of surgery - swish for 30 seconds -DO NOT SWALLOW, SPIT OUT    choline fenofibrate (TRILIPIX) 135 mg, oral, Daily    docusate sodium (COLACE) 200 mg, oral, Daily    fluticasone (Flonase) 50 mcg/actuation nasal spray 1 spray, Daily    lisinopril 60 mg, oral, Daily    oxyCODONE (ROXICODONE) 5 mg, oral, Every 6 hours PRN        Objective   Visit Vitals  /85   Pulse 81      Physical Exam  Vitals reviewed.   Constitutional:       Appearance: Normal appearance.   HENT:      Head: Normocephalic.      Mouth/Throat:      Mouth: Mucous membranes are moist.   Cardiovascular:      Rate and Rhythm: Normal rate and regular rhythm.    Pulmonary:      Effort: Pulmonary effort is normal.      Breath sounds: Normal breath sounds.   Abdominal:      General: There is no distension.   Neurological:      General: No focal deficit present.      Mental Status: He is alert.   Psychiatric:         Mood and Affect: Mood normal.         Judgment: Judgment normal.         Assessment/Plan   Brandon Snider is a 48 y.o. male with hx of CLL, obesity, MASLD who presents for follow up from recent hospital admission in 11/2023 for idiopathic pancreatitis attributed to possible hydrochlorothiazide that was discontinued on discharge.     Unclear if his symptoms were due to acute pancreatis vs ?gastroenteritis. Although lipase was elevated to >3XULN, he did not have symptoms c/w pancreatic pain. CT read does not demonstrate any finding of pancreatitis (describing gastritis).  His prodromal symptoms preceding admission suggest possible viral illness that would explain gastric thickening as well as Hpylori which has since been treated with confirmed eradication.  MRI with MRCP images to evaluate biliary tree and for any structural pathology should small pancreatic cyst. Reassuringly, he has clinically improved and is currently doing well. Due for 6 month repeat to confirm stability of pancreatic cyst and liver ?hemagioma. If stable, repeat in 2 years.        Of note, review of labs show findings of steatosis on imaging and prior elevation in liver enzymes.  Fibroscan with F2 fibrosis. Discussed with patient recommendation for weight loss of at least 10% along with moderate exercise for 30 minutes at least 3x weekly to minimize risk of progression to advanced fibrosis/cirrhosis. His repeat liver enzymes two weeks ago were WNL. He had prior viral hepatitis serologies evaluated that were unremarkable. Ferritin WNL. Plan to repeat fibroscan in 202. Discussed with patient new medication Rezdiffra, which may be option if no contraindication with hx of CLL and MR unchanged.      He is agreeable with plan.    RTC in 4 months        Problem List Items Addressed This Visit       CLL (chronic lymphocytic leukemia) (Multi)    Elevated LFTs    Fatty liver    Idiopathic acute pancreatitis without infection or necrosis (HHS-HCC)    H. pylori infection     Other Visit Diagnoses       Hepatic steatosis    -  Primary    Pancreatic cyst (HHS-HCC)        Relevant Orders    MR abdomen w and wo IV contrast                     Bianca Austin MD         My final recommendations will be communicated back to the requesting physician by way of shared Medical record or letter to requesting physician via fax.

## 2025-03-13 ENCOUNTER — SPECIALTY PHARMACY (OUTPATIENT)
Dept: PHARMACY | Facility: CLINIC | Age: 50
End: 2025-03-13

## 2025-03-13 PROCEDURE — RXMED WILLOW AMBULATORY MEDICATION CHARGE

## 2025-03-14 ENCOUNTER — PHARMACY VISIT (OUTPATIENT)
Dept: PHARMACY | Facility: CLINIC | Age: 50
End: 2025-03-14
Payer: COMMERCIAL

## 2025-03-17 ENCOUNTER — APPOINTMENT (OUTPATIENT)
Dept: SURGERY | Facility: CLINIC | Age: 50
End: 2025-03-17
Payer: COMMERCIAL

## 2025-03-17 VITALS
HEART RATE: 80 BPM | SYSTOLIC BLOOD PRESSURE: 133 MMHG | TEMPERATURE: 97.4 F | DIASTOLIC BLOOD PRESSURE: 88 MMHG | OXYGEN SATURATION: 96 % | RESPIRATION RATE: 16 BRPM

## 2025-03-17 DIAGNOSIS — K42.9 UMBILICAL HERNIA WITHOUT OBSTRUCTION AND WITHOUT GANGRENE: Primary | ICD-10-CM

## 2025-03-17 PROCEDURE — 3075F SYST BP GE 130 - 139MM HG: CPT | Performed by: SURGERY

## 2025-03-17 PROCEDURE — 99024 POSTOP FOLLOW-UP VISIT: CPT | Performed by: SURGERY

## 2025-03-17 PROCEDURE — 1036F TOBACCO NON-USER: CPT | Performed by: SURGERY

## 2025-03-17 PROCEDURE — 3079F DIAST BP 80-89 MM HG: CPT | Performed by: SURGERY

## 2025-03-17 NOTE — PROGRESS NOTES
Follow-Up Note        Referring Provider: Dr. Bernard Hernandez        Chief Complaint:  Follow up from open umbilical hernia repair with mesh        History of Present Illness:  This is a 50-year-old gentleman presenting for follow-up after open umbilical hernia repair with preperitoneal mesh on 3/7/2025.  He has been healing well since surgery.    Past Medical History:  Refer to H&P         Past Surgical History:  Refer to H&P         Medications:  Refer to H&P         Allergies:  Refer to H&P         Family History:  Refer to H&P         Social History:  Refer to H&P         Review of Systems  A complete 10 point review of systems was performed and is negative except as noted in the history of present illness.     Physical Exam:   /88 (BP Location: Right arm, Patient Position: Sitting, BP Cuff Size: Large adult)   Pulse 80   Temp 36.3 °C (97.4 °F) (Temporal)   Resp 16   SpO2 96%     General: No acute distress.   Neuro: Alert and oriented ×3. Follows commands.  Head: Atraumatic  Eyes: Pupils equal reactive to light. Extraocular motions intact.  Ears: Hears normal speaking voice.  Mouth, Nose, Throat: Mucous membranes moist.  Normal dentition.  Neck: Supple. No appreciable masses.  Chest: No crepitus.    Heart: Regular rate and rhythm.  Vascular: No carotid bruits.  Palpable radial pulses bilaterally.  Abdomen: Soft. Nondistended. Nontender.  Well-healed umbilical incision.  No obvious recurrent hernia.  Musculoskeletal: Moves all extremities.  Normal range of motion.  Lymphatic: No palpable lymph nodes.  Skin: No rashes or lesions.  Psychological: Normal affect           Labs:  None           Imaging:   None        Assessment:  This is a 50-year-old male who presents for follow up of an open umbilical hernia repair with intraperitoneal mesh.  He has been doing very well since surgery.  He has no complaints.  There is no evidence of any obvious complications.  There is no evidence of any recurrent hernia at  this time.           Plan:  -- OK to swim and submerge.  -- No lifting anything heavier than 20 pounds for another 4 weeks, which will be 6 weeks postoperative.  -- At this point, there is no specific need to follow up with me.  If he has any new questions or concerns, he may return at any time.        Vidhya Holley MD MPH  General Surgery  Office: [phone]  Fax: [phone]

## 2025-03-20 ENCOUNTER — TELEPHONE (OUTPATIENT)
Dept: SURGERY | Facility: CLINIC | Age: 50
End: 2025-03-20
Payer: COMMERCIAL

## 2025-03-20 NOTE — TELEPHONE ENCOUNTER
Outbound call made to pt to follow up from his post op visit on 3/17, message left for pt to return call.

## 2025-03-27 ENCOUNTER — HOSPITAL ENCOUNTER (OUTPATIENT)
Dept: RADIOLOGY | Facility: CLINIC | Age: 50
Discharge: HOME | End: 2025-03-27
Payer: COMMERCIAL

## 2025-03-27 DIAGNOSIS — K86.2 PANCREATIC CYST (HHS-HCC): ICD-10-CM

## 2025-04-04 ENCOUNTER — APPOINTMENT (OUTPATIENT)
Dept: HEMATOLOGY/ONCOLOGY | Facility: CLINIC | Age: 50
End: 2025-04-04
Payer: COMMERCIAL

## 2025-04-07 ENCOUNTER — OFFICE VISIT (OUTPATIENT)
Dept: HEMATOLOGY/ONCOLOGY | Facility: CLINIC | Age: 50
End: 2025-04-07
Payer: COMMERCIAL

## 2025-04-07 ENCOUNTER — LAB (OUTPATIENT)
Dept: LAB | Facility: CLINIC | Age: 50
End: 2025-04-07
Payer: COMMERCIAL

## 2025-04-07 ENCOUNTER — SPECIALTY PHARMACY (OUTPATIENT)
Dept: HEMATOLOGY/ONCOLOGY | Facility: CLINIC | Age: 50
End: 2025-04-07

## 2025-04-07 VITALS
RESPIRATION RATE: 16 BRPM | TEMPERATURE: 96.8 F | DIASTOLIC BLOOD PRESSURE: 94 MMHG | WEIGHT: 260.8 LBS | OXYGEN SATURATION: 96 % | SYSTOLIC BLOOD PRESSURE: 144 MMHG | BODY MASS INDEX: 37.42 KG/M2 | HEART RATE: 67 BPM

## 2025-04-07 DIAGNOSIS — C91.10 CLL (CHRONIC LYMPHOCYTIC LEUKEMIA) (MULTI): ICD-10-CM

## 2025-04-07 DIAGNOSIS — I10 PRIMARY HYPERTENSION: ICD-10-CM

## 2025-04-07 DIAGNOSIS — E78.00 HYPERCHOLESTEROLEMIA: ICD-10-CM

## 2025-04-07 DIAGNOSIS — C91.10 CLL (CHRONIC LYMPHOCYTIC LEUKEMIA) (MULTI): Primary | ICD-10-CM

## 2025-04-07 LAB
ALBUMIN SERPL BCP-MCNC: 4.5 G/DL (ref 3.4–5)
ALP SERPL-CCNC: 46 U/L (ref 33–120)
ALT SERPL W P-5'-P-CCNC: 26 U/L (ref 10–52)
ANION GAP SERPL CALC-SCNC: 10 MMOL/L (ref 10–20)
AST SERPL W P-5'-P-CCNC: 15 U/L (ref 9–39)
BASOPHILS # BLD AUTO: 0.05 X10*3/UL (ref 0–0.1)
BASOPHILS NFR BLD AUTO: 0.5 %
BILIRUB SERPL-MCNC: 0.4 MG/DL (ref 0–1.2)
BUN SERPL-MCNC: 9 MG/DL (ref 6–23)
CALCIUM SERPL-MCNC: 9.2 MG/DL (ref 8.6–10.3)
CHLORIDE SERPL-SCNC: 104 MMOL/L (ref 98–107)
CO2 SERPL-SCNC: 29 MMOL/L (ref 21–32)
CREAT SERPL-MCNC: 0.61 MG/DL (ref 0.5–1.3)
EGFRCR SERPLBLD CKD-EPI 2021: >90 ML/MIN/1.73M*2
EOSINOPHIL # BLD AUTO: 0.27 X10*3/UL (ref 0–0.7)
EOSINOPHIL NFR BLD AUTO: 2.5 %
ERYTHROCYTE [DISTWIDTH] IN BLOOD BY AUTOMATED COUNT: 14.8 % (ref 11.5–14.5)
GLUCOSE SERPL-MCNC: 109 MG/DL (ref 74–99)
HCT VFR BLD AUTO: 39.9 % (ref 41–52)
HGB BLD-MCNC: 12.8 G/DL (ref 13.5–17.5)
IMM GRANULOCYTES # BLD AUTO: 0.13 X10*3/UL (ref 0–0.7)
IMM GRANULOCYTES NFR BLD AUTO: 1.2 % (ref 0–0.9)
LYMPHOCYTES # BLD AUTO: 3.92 X10*3/UL (ref 1.2–4.8)
LYMPHOCYTES NFR BLD AUTO: 37 %
MCH RBC QN AUTO: 27.4 PG (ref 26–34)
MCHC RBC AUTO-ENTMCNC: 32.1 G/DL (ref 32–36)
MCV RBC AUTO: 85 FL (ref 80–100)
MONOCYTES # BLD AUTO: 0.73 X10*3/UL (ref 0.1–1)
MONOCYTES NFR BLD AUTO: 6.9 %
NEUTROPHILS # BLD AUTO: 5.5 X10*3/UL (ref 1.2–7.7)
NEUTROPHILS NFR BLD AUTO: 51.9 %
PLATELET # BLD AUTO: 327 X10*3/UL (ref 150–450)
POTASSIUM SERPL-SCNC: 4.1 MMOL/L (ref 3.5–5.3)
PROT SERPL-MCNC: 6.6 G/DL (ref 6.4–8.2)
RBC # BLD AUTO: 4.68 X10*6/UL (ref 4.5–5.9)
SODIUM SERPL-SCNC: 139 MMOL/L (ref 136–145)
WBC # BLD AUTO: 10.6 X10*3/UL (ref 4.4–11.3)

## 2025-04-07 PROCEDURE — 3077F SYST BP >= 140 MM HG: CPT | Performed by: INTERNAL MEDICINE

## 2025-04-07 PROCEDURE — 85025 COMPLETE CBC W/AUTO DIFF WBC: CPT

## 2025-04-07 PROCEDURE — 99214 OFFICE O/P EST MOD 30 MIN: CPT | Performed by: INTERNAL MEDICINE

## 2025-04-07 PROCEDURE — 36415 COLL VENOUS BLD VENIPUNCTURE: CPT

## 2025-04-07 PROCEDURE — 3080F DIAST BP >= 90 MM HG: CPT | Performed by: INTERNAL MEDICINE

## 2025-04-07 PROCEDURE — 84075 ASSAY ALKALINE PHOSPHATASE: CPT

## 2025-04-07 ASSESSMENT — PAIN SCALES - GENERAL: PAINLEVEL_OUTOF10: 0-NO PAIN

## 2025-04-07 NOTE — PROGRESS NOTES
Patient ID: Brandon Snider is a 50 y.o. male.  Referring Physician: Gage Zavala MD  [id number]  [place] [address]  Primary Care Provider: Bernard Hernandez MD  Visit Type: Follow Up      Subjective    HPI How are my blood counts?    Review of Systems   Constitutional: Negative.    HENT:  Negative.     Eyes: Negative.    Respiratory: Negative.     Cardiovascular: Negative.    Gastrointestinal: Negative.    Endocrine: Negative.    Genitourinary: Negative.     Musculoskeletal: Negative.    Skin: Negative.    Neurological: Negative.    Hematological: Negative.    Psychiatric/Behavioral: Negative.          Objective   BSA: 2.41 meters squared  BP (!) 144/94 (BP Location: Right arm, Patient Position: Sitting, BP Cuff Size: Large adult) Comment: DANNY NEWMAN AWARE.  Pulse 67   Temp 36 °C (96.8 °F) (Temporal)   Resp 16   Wt 118 kg (260 lb 12.9 oz) Comment: NO SHOES,COAT  SpO2 96%   BMI 37.42 kg/m²      has a past medical history of CLL (chronic lymphocytic leukemia) (Multi), Encounter for general adult medical examination without abnormal findings (11/05/2019), Hyperlipidemia, Hypertension, Obesity, unspecified (11/05/2019), Other specified abnormal findings of blood chemistry (04/29/2021), Other specified counseling (12/14/2020), Personal history of diseases of the blood and blood-forming organs and certain disorders involving the immune mechanism (04/29/2021), and Sinobronchitis (01/27/2023).   has a past surgical history that includes Dental surgery (Bilateral) and Upper gastrointestinal endoscopy.  Family History   Problem Relation Name Age of Onset    Esophageal cancer Mother      Hypertension Mother      Other (elevated liver enzymes) Father      Other (cardiac disorder) Father      Prostate cancer Father      Prostate cancer Paternal Grandfather      Prostate cancer Other grandfather      Oncology History   CLL (chronic lymphocytic leukemia) (Multi)   1/27/2023 Initial Diagnosis     CLL (chronic lymphocytic leukemia) (Multi)     11/22/2024 -  Chemotherapy    Zanubrutinib, 84 Day Cycles         Brandon Snider  reports that he has never smoked. He has never used smokeless tobacco.  He  reports current alcohol use.  He  reports no history of drug use.    Physical Exam  Vitals reviewed.   Constitutional:       Appearance: Normal appearance.   HENT:      Head: Normocephalic.      Mouth/Throat:      Mouth: Mucous membranes are moist.   Eyes:      Extraocular Movements: Extraocular movements intact.      Pupils: Pupils are equal, round, and reactive to light.   Cardiovascular:      Rate and Rhythm: Normal rate and regular rhythm.      Pulses: Normal pulses.      Heart sounds: Normal heart sounds.   Pulmonary:      Effort: Pulmonary effort is normal.      Breath sounds: Normal breath sounds.   Abdominal:      General: Bowel sounds are normal.      Palpations: Abdomen is soft.   Musculoskeletal:         General: Normal range of motion.      Cervical back: Normal range of motion and neck supple.   Skin:     General: Skin is warm.   Neurological:      General: No focal deficit present.      Mental Status: He is alert and oriented to person, place, and time.   Psychiatric:         Mood and Affect: Mood normal.         Behavior: Behavior normal.         WBC   Date/Time Value Ref Range Status   04/07/2025 08:50 AM 10.6 4.4 - 11.3 x10*3/uL Final   02/25/2025 10:28 AM 12.2 (H) 4.4 - 11.3 x10*3/uL Final   01/02/2025 01:49 PM 19.9 (H) 4.4 - 11.3 x10*3/uL Final     nRBC   Date Value Ref Range Status   02/25/2025 0.0 0.0 - 0.0 /100 WBCs Final   01/02/2025   Final     Comment:     Not Measured   12/03/2024   Final     Comment:     Not Measured     RBC   Date Value Ref Range Status   04/07/2025 4.68 4.50 - 5.90 x10*6/uL Final   02/25/2025 4.70 4.50 - 5.90 x10*6/uL Final   01/02/2025 4.51 4.50 - 5.90 x10*6/uL Final     Hemoglobin   Date Value Ref Range Status   04/07/2025 12.8 (L) 13.5 - 17.5 g/dL Final  "  02/25/2025 12.9 (L) 13.5 - 17.5 g/dL Final   01/02/2025 12.6 (L) 13.5 - 17.5 g/dL Final     Hematocrit   Date Value Ref Range Status   04/07/2025 39.9 (L) 41.0 - 52.0 % Final   02/25/2025 39.6 (L) 41.0 - 52.0 % Final   01/02/2025 40.2 (L) 41.0 - 52.0 % Final     MCV   Date/Time Value Ref Range Status   04/07/2025 08:50 AM 85 80 - 100 fL Final   02/25/2025 10:28 AM 84 80 - 100 fL Final   01/02/2025 01:49 PM 89 80 - 100 fL Final     MCH   Date/Time Value Ref Range Status   04/07/2025 08:50 AM 27.4 26.0 - 34.0 pg Final   02/25/2025 10:28 AM 27.4 26.0 - 34.0 pg Final   01/02/2025 01:49 PM 27.9 26.0 - 34.0 pg Final     MCHC   Date/Time Value Ref Range Status   04/07/2025 08:50 AM 32.1 32.0 - 36.0 g/dL Final   02/25/2025 10:28 AM 32.6 32.0 - 36.0 g/dL Final   01/02/2025 01:49 PM 31.3 (L) 32.0 - 36.0 g/dL Final     RDW   Date/Time Value Ref Range Status   04/07/2025 08:50 AM 14.8 (H) 11.5 - 14.5 % Final   02/25/2025 10:28 AM 13.7 11.5 - 14.5 % Final   01/02/2025 01:49 PM 12.8 11.5 - 14.5 % Final     Platelets   Date/Time Value Ref Range Status   04/07/2025 08:50  150 - 450 x10*3/uL Final   02/25/2025 10:28  150 - 450 x10*3/uL Final   01/02/2025 01:49  150 - 450 x10*3/uL Final     No results found for: \"MPV\"  Neutrophils %   Date/Time Value Ref Range Status   04/07/2025 08:50 AM 51.9 40.0 - 80.0 % Final   01/02/2025 01:49 PM 32.8 40.0 - 80.0 % Final   12/03/2024 09:21 AM 13.5 40.0 - 80.0 % Final     Immature Granulocytes %, Automated   Date/Time Value Ref Range Status   04/07/2025 08:50 AM 1.2 (H) 0.0 - 0.9 % Final     Comment:     Immature Granulocyte Count (IG) includes promyelocytes, myelocytes and metamyelocytes but does not include bands. Percent differential counts (%) should be interpreted in the context of the absolute cell counts (cells/UL).   01/02/2025 01:49 PM 0.8 0.0 - 0.9 % Final     Comment:     Immature Granulocyte Count (IG) includes promyelocytes, myelocytes and metamyelocytes but does " not include bands. Percent differential counts (%) should be interpreted in the context of the absolute cell counts (cells/UL).   12/03/2024 09:21 AM 0.3 0.0 - 0.9 % Final     Comment:     Immature Granulocyte Count (IG) includes promyelocytes, myelocytes and metamyelocytes but does not include bands. Percent differential counts (%) should be interpreted in the context of the absolute cell counts (cells/UL).     Lymphocytes %, Manual   Date/Time Value Ref Range Status   11/13/2024 08:52 AM 76.0 13.0 - 44.0 % Final   07/10/2024 03:26 PM 88.0 13.0 - 44.0 % Final   06/20/2024 09:05 AM 91.0 13.0 - 44.0 % Final     Lymphocytes %   Date/Time Value Ref Range Status   04/07/2025 08:50 AM 37.0 13.0 - 44.0 % Final   01/02/2025 01:49 PM 60.3 13.0 - 44.0 % Final   12/03/2024 09:21 AM 83.2 13.0 - 44.0 % Final     Monocytes %, Manual   Date/Time Value Ref Range Status   11/13/2024 08:52 AM 5.0 2.0 - 10.0 % Final   07/10/2024 03:26 PM 2.0 2.0 - 10.0 % Final   06/20/2024 09:05 AM 2.0 2.0 - 10.0 % Final     Monocytes %   Date/Time Value Ref Range Status   04/07/2025 08:50 AM 6.9 2.0 - 10.0 % Final   01/02/2025 01:49 PM 4.4 2.0 - 10.0 % Final   12/03/2024 09:21 AM 2.1 2.0 - 10.0 % Final     Eosinophils %, Manual   Date/Time Value Ref Range Status   11/13/2024 08:52 AM 0.0 0.0 - 6.0 % Final   07/10/2024 03:26 PM 1.0 0.0 - 6.0 % Final   06/20/2024 09:05 AM 1.0 0.0 - 6.0 % Final     Eosinophils %   Date/Time Value Ref Range Status   04/07/2025 08:50 AM 2.5 0.0 - 6.0 % Final   01/02/2025 01:49 PM 1.4 0.0 - 6.0 % Final   12/03/2024 09:21 AM 0.7 0.0 - 6.0 % Final     Basophils %, Manual   Date/Time Value Ref Range Status   11/13/2024 08:52 AM 0.0 0.0 - 2.0 % Final   07/10/2024 03:26 PM 0.0 0.0 - 2.0 % Final   06/20/2024 09:05 AM 0.0 0.0 - 2.0 % Final     Basophils %   Date/Time Value Ref Range Status   04/07/2025 08:50 AM 0.5 0.0 - 2.0 % Final   01/02/2025 01:49 PM 0.3 0.0 - 2.0 % Final   12/03/2024 09:21 AM 0.2 0.0 - 2.0 % Final      Neutrophils Absolute   Date/Time Value Ref Range Status   04/07/2025 08:50 AM 5.50 1.20 - 7.70 x10*3/uL Final     Comment:     Percent differential counts (%) should be interpreted in the context of the absolute cell counts (cells/uL).   01/02/2025 01:49 PM 6.55 1.20 - 7.70 x10*3/uL Final     Comment:     Percent differential counts (%) should be interpreted in the context of the absolute cell counts (cells/uL).   12/03/2024 09:21 AM 5.22 1.20 - 7.70 x10*3/uL Final     Comment:     Percent differential counts (%) should be interpreted in the context of the absolute cell counts (cells/uL).     Immature Granulocytes Absolute, Automated   Date/Time Value Ref Range Status   04/07/2025 08:50 AM 0.13 0.00 - 0.70 x10*3/uL Final   01/02/2025 01:49 PM 0.16 0.00 - 0.70 x10*3/uL Final   12/03/2024 09:21 AM 0.11 0.00 - 0.70 x10*3/uL Final     Lymphocytes Absolute   Date/Time Value Ref Range Status   04/07/2025 08:50 AM 3.92 1.20 - 4.80 x10*3/uL Final   01/02/2025 01:49 PM 12.03 (H) 1.20 - 4.80 x10*3/uL Final   12/03/2024 09:21 AM 32.07 (H) 1.20 - 4.80 x10*3/uL Final     Monocytes Absolute   Date/Time Value Ref Range Status   04/07/2025 08:50 AM 0.73 0.10 - 1.00 x10*3/uL Final   01/02/2025 01:49 PM 0.87 0.10 - 1.00 x10*3/uL Final   12/03/2024 09:21 AM 0.80 0.10 - 1.00 x10*3/uL Final     Eosinophils Absolute   Date/Time Value Ref Range Status   04/07/2025 08:50 AM 0.27 0.00 - 0.70 x10*3/uL Final   01/02/2025 01:49 PM 0.28 0.00 - 0.70 x10*3/uL Final   12/03/2024 09:21 AM 0.26 0.00 - 0.70 x10*3/uL Final     Eosinophils Absolute, Manual   Date/Time Value Ref Range Status   11/13/2024 08:52 AM 0.00 0.00 - 0.70 x10*3/uL Final   07/10/2024 03:26 PM 0.71 (H) 0.00 - 0.70 x10*3/uL Final   06/20/2024 09:05 AM 0.66 0.00 - 0.70 x10*3/uL Final     Basophils Absolute   Date/Time Value Ref Range Status   04/07/2025 08:50 AM 0.05 0.00 - 0.10 x10*3/uL Final   01/02/2025 01:49 PM 0.05 0.00 - 0.10 x10*3/uL Final     Comment:     Automated WBC  "differential has been confirmed by manual smear.   12/03/2024 09:21 AM 0.07 0.00 - 0.10 x10*3/uL Final     Comment:     Automated WBC differential has been confirmed by manual smear.     Basophils Absolute, Manual   Date/Time Value Ref Range Status   11/13/2024 08:52 AM 0.00 0.00 - 0.10 x10*3/uL Final   07/10/2024 03:26 PM 0.00 0.00 - 0.10 x10*3/uL Final   06/20/2024 09:05 AM 0.00 0.00 - 0.10 x10*3/uL Final       No components found for: \"PT\"  No results found for: \"APTT\"  Medication Documentation Review Audit       Reviewed by Paola Daniel MA (Medical Assistant) on 04/07/25 at 1338      Medication Order Taking? Sig Documenting Provider Last Dose Status   amLODIPine (Norvasc) 10 mg tablet [id number] Yes Take 1 tablet (10 mg) by mouth once daily. Bernard Hernandez MD Past Week Active   chlorhexidine (Peridex) 0.12 % solution [id number] Yes Swish and spit 15 ml for 2 doses, 15mL the night before surgery and 15 ml morning of surgery - swish for 30 seconds -DO NOT SWALLOW, SPIT OUT REEMA No-CNP 3/7/2025 Morning Active   choline fenofibrate (Trilipix) 135 mg DR capsule [id number] Yes TAKE 1 CAPSULE BY MOUTH ONCE DAILY Bernard Hernandez MD Past Week Active   fluticasone (Flonase) 50 mcg/actuation nasal spray [id number] Yes Administer 1 spray into each nostril once daily. Shake gently. Before first use, prime pump. After use, clean tip and replace cap. Historical Provider, MD Past Week Active   lisinopril 40 mg tablet [id number] Yes Take 1.5 tablets (60 mg) by mouth once daily. Bernard Hernandez MD Past Week Active   oxyCODONE (Roxicodone) 5 mg immediate release tablet [id number] Yes Take 1 tablet (5 mg) by mouth every 6 hours if needed for severe pain (7 - 10). Vidhya Holley MD  Active   zanubrutinib (Brukinsa) 80 mg capsule [id number] Yes Take 2 capsules (160 mg total) by mouth 2 times a day.  Swallow whole Gage Zavala MD Past Week Active                   Assessment/Plan    1) CLL/SLL  - here for interval " followup  -today's labs included CBC, COMP, mag, uric acid, LDH  -results reviewed--wbc 96.6, hgb 12.4, plt 271,000, ANC 10.630, abs lymph 73.420, creatinine 0.71, calcium 9.9, AST 21, ALT 33, uric acid 6.4, , mag 1.98  -he continues without any symptoms secondary to CLL  -we reviewed again the indications for initiation of therapy--bothersome B symptoms, development of symptomatic anemia/thrombocytopenia, symptomatic bulky lymphadenopathy, symptomatic hepatosplenomegaly  -however, 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, 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, for which CLL can progress faster, and this would explain his accelerating lymphocytosis  -I have therefore advised initiation of BTKi  -he is in agreement to proceed  -benefits, risks, potential morbidity related to zanubrutinib were reviewed with Brandon and he signed informed consent to proceed  -he will start zanubrutinib (Brukinsa) 160 mg PO BID  -he will then return 2 weeks after initiation of brukinsa  -he is aware that the circulating lymphocyte count will rise even more after start of therapy--this is how the drug works, and is not at all an indication that his CLL is getting worse  -will also monitor tumor lysis labs  -here for interval followup  -has taken brukinsa for 7 weeks now--has had no ill effects  -feels really well  -saw general surgery yesterday--plan is for umbilical hernia repair in March  -labs done on 1/3/2025 included CBC + COMP   -results reviewed--wbc 19.9, hgb 12.6, plt 353,000, ANC 6550, abs lymph 12,030, creatinine 1.03, calcium 9.3, alk phos 47, AST 19, ALT 28  -limited physical exam was done today--no cervical, supraclavicular, axillary adenopathy  -he will continue to take brukinsa 160 mg PO BID  -I do not recommend that he stop brukinsa delmy-operatively--he has a very rare subtype of CLL (NOTCH1+) which is considered aggressive and patients who go off BTKi can start relapsing within 1-2 weeks; I have advised Ed that  he should continue to take brukinsa without interruption  -here for interval followup  -on 3/7/2025 he underwent umbilical hernia repair by Dr Holley  -labs to be done today included CBC + COMP  -results reviewed--wbc 10.6, hgb 12.8, plt 327,000, ANC 5500, abs lymph 3920, creatinine 0.61, calcium 9.2, alk phos 46, AST 15, total bili 0.4, ALT 26  -he will continue taking brukinsa 160 mg BID  -will see him again in 3 months     2. HTN  -on lisinopril  -on amlodipine     3.  HLD  -on trilipix           Problem List Items Addressed This Visit             ICD-10-CM    CLL (chronic lymphocytic leukemia) (Multi) C91.10    Relevant Orders    Clinic Appointment Request Follow Up; L[facility]; [facility] [id number]    CBC and Auto Differential    Comprehensive metabolic panel            Gage Zavala MD

## 2025-04-09 ENCOUNTER — HOSPITAL ENCOUNTER (OUTPATIENT)
Dept: RADIOLOGY | Facility: CLINIC | Age: 50
End: 2025-04-09
Payer: COMMERCIAL

## 2025-04-10 ENCOUNTER — SPECIALTY PHARMACY (OUTPATIENT)
Dept: PHARMACY | Facility: CLINIC | Age: 50
End: 2025-04-10

## 2025-04-10 PROCEDURE — RXMED WILLOW AMBULATORY MEDICATION CHARGE

## 2025-04-10 ASSESSMENT — ENCOUNTER SYMPTOMS
EYES NEGATIVE: 1
ENDOCRINE NEGATIVE: 1
GASTROINTESTINAL NEGATIVE: 1
RESPIRATORY NEGATIVE: 1
CARDIOVASCULAR NEGATIVE: 1
HEMATOLOGIC/LYMPHATIC NEGATIVE: 1
PSYCHIATRIC NEGATIVE: 1
NEUROLOGICAL NEGATIVE: 1
MUSCULOSKELETAL NEGATIVE: 1
CONSTITUTIONAL NEGATIVE: 1

## 2025-04-11 ENCOUNTER — PHARMACY VISIT (OUTPATIENT)
Dept: PHARMACY | Facility: CLINIC | Age: 50
End: 2025-04-11
Payer: COMMERCIAL

## 2025-04-17 ENCOUNTER — HOSPITAL ENCOUNTER (OUTPATIENT)
Dept: RADIOLOGY | Facility: CLINIC | Age: 50
End: 2025-04-17
Payer: COMMERCIAL

## 2025-04-17 ENCOUNTER — TELEPHONE (OUTPATIENT)
Dept: HEMATOLOGY/ONCOLOGY | Facility: CLINIC | Age: 50
End: 2025-04-17
Payer: COMMERCIAL

## 2025-04-17 NOTE — TELEPHONE ENCOUNTER
Wife called that patient is was in ED yesterday with pancreatitis. She wants to make sure we see all the information.  They are asking for a call before 12 or after 1.  Wife is giving a presentation during that hour.

## 2025-04-17 NOTE — TELEPHONE ENCOUNTER
Reason for Conversation  Pancreatitis    Background   Spoke with the patient's wife- Jennie. Pt is at the [facility] and they want to keep him overnight. Explained to Jennie that I will update Dr. Zavala. Explained that Dr. Zavala does not have privileges or ability to order at [facility]. Jennie verbalized understanding and had no further questions or concerns at this time.     Disposition   No disposition on file.

## 2025-04-21 ENCOUNTER — TELEPHONE (OUTPATIENT)
Dept: HEMATOLOGY/ONCOLOGY | Facility: CLINIC | Age: 50
End: 2025-04-21
Payer: COMMERCIAL

## 2025-04-21 NOTE — TELEPHONE ENCOUNTER
Reason for Conversation  Wife is asking if you can see all his records from [facility] and is she missing anything she needs to tell them?      Background       Disposition   No disposition on file.

## 2025-04-24 ENCOUNTER — TELEPHONE (OUTPATIENT)
Dept: HEMATOLOGY/ONCOLOGY | Facility: CLINIC | Age: 50
End: 2025-04-24
Payer: COMMERCIAL

## 2025-04-24 ENCOUNTER — TELEPHONE (OUTPATIENT)
Dept: PRIMARY CARE | Facility: CLINIC | Age: 50
End: 2025-04-24
Payer: COMMERCIAL

## 2025-04-24 ENCOUNTER — PATIENT OUTREACH (OUTPATIENT)
Dept: PRIMARY CARE | Facility: CLINIC | Age: 50
End: 2025-04-24
Payer: COMMERCIAL

## 2025-04-24 NOTE — TELEPHONE ENCOUNTER
Reason for Conversation  FUV after admission and Calling with question about patient's pain    Background   Spoke with Jennie- patient's wife. Provided update from Dr. Zavala & Skip. Jennie repeated plan back to me. Also provided office fax number in case surgeon wishes to fax a release form to the office.     Jennie states patient had abd pain after eating yesterday and was also very anxious. She provided him with one of her Xanax and they meditated together and that calmed him. Also had a temp of 100.2 during this abd pain. Pt has surgeon consult for possible gallbladder surgery on 5.13. Hopefully will determine surgery date after that. Pt will continue Brukinsa until then. Jennie verbalized understanding and had no further questions or concerns at this time.     Disposition   No disposition on file.

## 2025-04-24 NOTE — TELEPHONE ENCOUNTER
Pt was on lexapro (wife unsure of the dose) but has been off for a few months. Went to ER for pancreatitis and now patient is having severe anxiety and can't sleep and wife is wondering if you can call in the lexapro again so he can restart??    She knows Dr. Hernandez is off until Monday but would like to send this message to provider on call.  Please advise               [facility]

## 2025-04-24 NOTE — PROGRESS NOTES
Discharge Facility:   [facility]   Discharge Diagnosis:  Acute pancreatitis, unspecified complication status, unspecified pancreatitis type (HCC)   Acute on chronic pancreatitis (HCC)   Admission Date:  4/21/25  Discharge Date: 4/23/24    PCP Appointment Date:  4/29/25  Specialist Appointment Date:  5/13 surgery    Hospital Encounter and Summary Linked: Yes    General surgery also consulted and recommended outpatient elective cholecystectomy and holding patient's oral chemotherapy for a couple days before the surgery can be performed.     Wrap Up  Wrap Up Additional Comments: Successful transition of care outreach with patient and spouse. spouse reports pt ate this am and has increased pain since eating. wife states pt  also had a fever in the middle of the night. pt has pcp and surgery appts scheduled. pt is having a lot of anxiety and requesting to restart lexapro as discussed with pcp.  no new meds given at discharge.cm spoke with office who advised pt come to urgent care located on [facility] to address concerns. pt agreeable . pts spouse advised to go back to ed if pt not improving. . Pt and spouse aware of my availability for non-emergent concerns. Contact info provided. (4/24/2025 11:49 AM)    Medications  Medications reviewed with patient/caregiver?: Yes (4/24/2025 11:49 AM)  Is the patient having any side effects they believe may be caused by any medication additions or changes?: No (4/24/2025 11:49 AM)  Does the patient have all medications ordered at discharge?: Yes (4/24/2025 11:49 AM)  Prescription Comments: No scripts give, - pt requesting to restaart Lexapro. (4/24/2025 11:49 AM)  Is the patient taking all medications as directed (includes completed medication regime)?: Yes (4/24/2025 11:49 AM)  Care Management Interventions: Provided patient education (4/24/2025 11:49 AM)  Medication Comments: Pt denies problems obtaining or affording medication. No medication-related issues identified  (4/24/2025 11:49 AM)    Appointments  Does the patient have a primary care provider?: Yes (4/24/2025 11:49 AM)  Care Management Interventions: Verified appointment date/time/provider (4/24/2025 11:49 AM)  Has the patient kept scheduled appointments due by today?: Yes (4/24/2025 11:49 AM)  Care Management Interventions: Advised patient to keep appointment (4/24/2025 11:49 AM)    Self Management  What is the home health agency?: DENIES NEED (4/24/2025 11:49 AM)  What Durable Medical Equipment (DME) was ordered?: N/A (4/24/2025 11:49 AM)    Patient Teaching  Does the patient have access to their discharge instructions?: Yes (4/24/2025 11:49 AM)  Care Management Interventions: Reviewed instructions with patient (4/24/2025 11:49 AM)  Is the patient/caregiver able to teach back the hierarchy of who to call/visit for symptoms/problems? PCP, Specialist, Home Health nurse, Urgent Care, ED, 911: Yes (4/24/2025 11:49 AM)

## 2025-04-24 NOTE — TELEPHONE ENCOUNTER
Reviewed w wife to go to urgent care   Odomzo Counseling- I discussed with the patient the risks of Odomzo including but not limited to nausea, vomiting, diarrhea, constipation, weight loss, changes in the sense of taste, decreased appetite, muscle spasms, and hair loss.  The patient verbalized understanding of the proper use and possible adverse effects of Odomzo.  All of the patient's questions and concerns were addressed.

## 2025-04-24 NOTE — TELEPHONE ENCOUNTER
Reason for Conversation  FUV after admission and Calling with question about patient's pain    Background   Patient's anxiety and pain is up after hospital admission.  Asking for guidance on what can be done for this.  Unable to sleep/ anxiety is up.  Also needs to set up follow up with Dr. Zavala.        Disposition   No disposition on file.

## 2025-04-28 ENCOUNTER — TELEPHONE (OUTPATIENT)
Dept: PRIMARY CARE | Facility: CLINIC | Age: 50
End: 2025-04-28
Payer: COMMERCIAL

## 2025-04-28 NOTE — TELEPHONE ENCOUNTER
Pt had a hosp follow up visit scheduled for 4/29/25. Wife/Jennie cancelled the appointment. Pt is in [facility] for pancreatitis. Jennie would like to talk to Dr. Hernandez about what is going on: [phone]

## 2025-04-29 ENCOUNTER — APPOINTMENT (OUTPATIENT)
Dept: RADIOLOGY | Facility: CLINIC | Age: 50
End: 2025-04-29
Payer: COMMERCIAL

## 2025-04-29 NOTE — TELEPHONE ENCOUNTER
Patients spouse calling again in regards to message from yesterday she would like for PCP to call her at [phone] to discuss patients condition if possible at 2:00pm. Please review.

## 2025-05-01 ENCOUNTER — HOME HEALTH ADMISSION (OUTPATIENT)
Dept: HOME HEALTH SERVICES | Facility: HOME HEALTH | Age: 50
End: 2025-05-01
Payer: COMMERCIAL

## 2025-05-01 ENCOUNTER — DOCUMENTATION (OUTPATIENT)
Dept: HOME HEALTH SERVICES | Facility: HOME HEALTH | Age: 50
End: 2025-05-01
Payer: COMMERCIAL

## 2025-05-01 NOTE — HH CARE COORDINATION
Home Care received a Referral for Nursing. We have processed the referral for a Start of Care on 5/2/2025.     If you have any questions or concerns, please feel free to contact us at [phone]. Follow the prompts, enter your five digit zip code, and you will be directed to your care team on [facility].

## 2025-05-02 ENCOUNTER — HOME CARE VISIT (OUTPATIENT)
Dept: HOME HEALTH SERVICES | Facility: HOME HEALTH | Age: 50
End: 2025-05-02

## 2025-05-02 DIAGNOSIS — C91.10 CLL (CHRONIC LYMPHOCYTIC LEUKEMIA) (MULTI): ICD-10-CM

## 2025-05-03 ENCOUNTER — HOME CARE VISIT (OUTPATIENT)
Dept: HOME HEALTH SERVICES | Facility: HOME HEALTH | Age: 50
End: 2025-05-03
Payer: COMMERCIAL

## 2025-05-03 VITALS
OXYGEN SATURATION: 96 % | DIASTOLIC BLOOD PRESSURE: 66 MMHG | HEART RATE: 75 BPM | TEMPERATURE: 96.4 F | SYSTOLIC BLOOD PRESSURE: 124 MMHG | HEIGHT: 70 IN | WEIGHT: 240 LBS | RESPIRATION RATE: 16 BRPM | BODY MASS INDEX: 34.36 KG/M2

## 2025-05-03 PROCEDURE — G0299 HHS/HOSPICE OF RN EA 15 MIN: HCPCS

## 2025-05-03 ASSESSMENT — ENCOUNTER SYMPTOMS
APPETITE LEVEL: FAIR
DENIES PAIN: 1
PERSON REPORTING PAIN: PATIENT
LOSS OF SENSATION IN FEET: 0
LAST BOWEL MOVEMENT: 67328
STOOL FREQUENCY: DAILY
PERSON REPORTING PAIN: PATIENT
DENIES PAIN: 1
HYPERTENSION: 1
BOWEL PATTERN NORMAL: 1
DEPRESSION: 0
LAST BOWEL MOVEMENT: 67328
OCCASIONAL FEELINGS OF UNSTEADINESS: 0

## 2025-05-03 ASSESSMENT — PAIN SCALES - PAIN ASSESSMENT IN ADVANCED DEMENTIA (PAINAD)
NEGVOCALIZATION: 0
BODYLANGUAGE: 0 - RELAXED.
TOTALSCORE: 0
CONSOLABILITY: 0
FACIALEXPRESSION: 0
BREATHING: 0
CONSOLABILITY: 0 - NO NEED TO CONSOLE.
FACIALEXPRESSION: 0 - SMILING OR INEXPRESSIVE.
BODYLANGUAGE: 0
NEGVOCALIZATION: 0 - NONE.

## 2025-05-03 ASSESSMENT — ACTIVITIES OF DAILY LIVING (ADL)
PHYSICAL TRANSFERS ASSESSED: 1
CURRENT_FUNCTION: INDEPENDENT
AMBULATION ASSISTANCE: 1
OASIS_M1830: 02
ENTERING_EXITING_HOME: NEEDS ASSISTANCE
AMBULATION ASSISTANCE: INDEPENDENT

## 2025-05-03 NOTE — HOME HEALTH
SN contacted Pts Spouse to schedule visit for SOC for enteral feedings instruction. Spouse reports no supplies delivered as yet. SN contacted [facility] regarding supply delivery from [facility]. Delivery time pending traffic. Spouse in agreement that since it was late in evening and Pt was taking clear liquids without issue that SOC would be rescheduled for tomorrow Saturday 5/3/2025. Spouse reports she will flush feeding tube with additional water throughout the evening. [facility] office notified.

## 2025-05-04 VITALS
RESPIRATION RATE: 20 BRPM | DIASTOLIC BLOOD PRESSURE: 68 MMHG | HEART RATE: 82 BPM | OXYGEN SATURATION: 95 % | SYSTOLIC BLOOD PRESSURE: 122 MMHG | TEMPERATURE: 98.2 F

## 2025-05-04 SDOH — ECONOMIC STABILITY: GENERAL

## 2025-05-04 ASSESSMENT — ENCOUNTER SYMPTOMS: APPETITE LEVEL: FAIR

## 2025-05-04 ASSESSMENT — ACTIVITIES OF DAILY LIVING (ADL): MONEY MANAGEMENT (EXPENSES/BILLS): INDEPENDENT

## 2025-05-05 ENCOUNTER — PATIENT OUTREACH (OUTPATIENT)
Dept: PRIMARY CARE | Facility: CLINIC | Age: 50
End: 2025-05-05
Payer: COMMERCIAL

## 2025-05-05 NOTE — PROGRESS NOTES
Discharge Facility: [facility]  Discharge Diagnosis: Necrotizing pancreatitis  Admission Date: 4/27/2025 (readmit)  Discharge Date: 5/2/2025    PCP Appointment Date:  -Pt wife Jennie declines scheduling at this time and states pt will be following up with specialists.    Specialist Appointment Date:   -5/14/2025 0900 MRI abdomen  -5/7/2025 0840 dermatology Dr. Maki  -5/13/2025 0900 general surgeon Dr. Acosta  -5/14/2025 0815 radiology  -6/25/2025 1500 gastroenterology Dr. Weaver    Hospital Encounter and Summary Linked: Yes    Hospital Encounter      See discharge assessment below for further details    Wrap Up  Wrap Up Additional Comments: Pt was admitted to [facility] 4/27-5/2/2025 for necrotizing pancreatitis. Spoke with pt wife Jennie who reports pt is transitioning well and is using a feeding tube. She states a nurse from [facility] will be out to see pt today. Jennie reports understanding of discharge instructions and states that pt has all of his follow up appts scheduled. She declines scheduling PCP appt at this time. Jennie denies any questions, needs, or concerns. She is encouraged to call if questions or needs arise. (5/5/2025 12:36 PM)  Call End Time: 1241 (5/5/2025 12:36 PM)    Engagement  Call Start Time: 1236 (5/5/2025 12:36 PM)    Medications  Is the patient taking all medications as directed (includes completed medication regime)?: Yes (5/5/2025 12:36 PM)    Appointments  Does the patient have a primary care provider?: Yes (5/5/2025 12:36 PM)  Has the patient kept scheduled appointments due by today?: Yes (5/5/2025 12:36 PM)    Self Management  What is the home health agency?: [facility] (5/5/2025 12:36 PM)  Has home health visited the patient within 72 hours of discharge?: Call prior to 72 hours (5/5/2025 12:36 PM)    Patient Teaching  Does the patient have access to their discharge instructions?: Yes (5/5/2025 12:36 PM)  Care Management  Interventions: Reviewed instructions with patient (5/5/2025 12:36 PM)  What is the patient's perception of their health status since discharge?: Improving (5/5/2025 12:36 PM)  Is the patient/caregiver able to teach back the hierarchy of who to call/visit for symptoms/problems? PCP, Specialist, Home Health nurse, Urgent Care, ED, 911: Yes (5/5/2025 12:36 PM)

## 2025-05-06 ENCOUNTER — HOME CARE VISIT (OUTPATIENT)
Dept: HOME HEALTH SERVICES | Facility: HOME HEALTH | Age: 50
End: 2025-05-06
Payer: COMMERCIAL

## 2025-05-06 VITALS
HEART RATE: 95 BPM | SYSTOLIC BLOOD PRESSURE: 123 MMHG | TEMPERATURE: 97.6 F | RESPIRATION RATE: 18 BRPM | DIASTOLIC BLOOD PRESSURE: 79 MMHG | OXYGEN SATURATION: 98 %

## 2025-05-06 PROCEDURE — G0299 HHS/HOSPICE OF RN EA 15 MIN: HCPCS

## 2025-05-06 ASSESSMENT — ENCOUNTER SYMPTOMS
PERSON REPORTING PAIN: PATIENT
LAST BOWEL MOVEMENT: 67331
STOOL FREQUENCY: DAILY
BOWEL PATTERN NORMAL: 1
DENIES PAIN: 1

## 2025-05-07 ENCOUNTER — SPECIALTY PHARMACY (OUTPATIENT)
Dept: PHARMACY | Facility: CLINIC | Age: 50
End: 2025-05-07

## 2025-05-08 ENCOUNTER — SPECIALTY PHARMACY (OUTPATIENT)
Dept: PHARMACY | Facility: CLINIC | Age: 50
End: 2025-05-08

## 2025-05-09 ENCOUNTER — HOME CARE VISIT (OUTPATIENT)
Dept: HOME HEALTH SERVICES | Facility: HOME HEALTH | Age: 50
End: 2025-05-09
Payer: COMMERCIAL

## 2025-05-10 ENCOUNTER — SPECIALTY PHARMACY (OUTPATIENT)
Dept: PHARMACY | Facility: CLINIC | Age: 50
End: 2025-05-10

## 2025-05-10 PROCEDURE — RXMED WILLOW AMBULATORY MEDICATION CHARGE

## 2025-05-13 ENCOUNTER — PHARMACY VISIT (OUTPATIENT)
Dept: PHARMACY | Facility: CLINIC | Age: 50
End: 2025-05-13
Payer: COMMERCIAL

## 2025-05-13 ENCOUNTER — TELEPHONE (OUTPATIENT)
Dept: HEMATOLOGY/ONCOLOGY | Facility: CLINIC | Age: 50
End: 2025-05-13
Payer: COMMERCIAL

## 2025-05-13 ENCOUNTER — HOME CARE VISIT (OUTPATIENT)
Dept: HOME HEALTH SERVICES | Facility: HOME HEALTH | Age: 50
End: 2025-05-13
Payer: COMMERCIAL

## 2025-05-13 VITALS
TEMPERATURE: 98.5 F | OXYGEN SATURATION: 99 % | SYSTOLIC BLOOD PRESSURE: 122 MMHG | HEART RATE: 94 BPM | DIASTOLIC BLOOD PRESSURE: 69 MMHG | RESPIRATION RATE: 16 BRPM

## 2025-05-13 DIAGNOSIS — C91.10 CLL (CHRONIC LYMPHOCYTIC LEUKEMIA) (MULTI): ICD-10-CM

## 2025-05-13 PROCEDURE — G0299 HHS/HOSPICE OF RN EA 15 MIN: HCPCS

## 2025-05-13 SDOH — ECONOMIC STABILITY: GENERAL

## 2025-05-13 ASSESSMENT — ACTIVITIES OF DAILY LIVING (ADL): MONEY MANAGEMENT (EXPENSES/BILLS): INDEPENDENT

## 2025-05-13 ASSESSMENT — ENCOUNTER SYMPTOMS
DENIES PAIN: 1
STOOL FREQUENCY: DAILY
PERSON REPORTING PAIN: PATIENT
LAST BOWEL MOVEMENT: 67338
CHANGE IN APPETITE: UNCHANGED
APPETITE LEVEL: FAIR

## 2025-05-13 NOTE — TELEPHONE ENCOUNTER
Patient just saw surgeon and had labs drawn.  Would like to update Fabiana on all that happened.  Would also like Fabiana to look at results of labs they just had done and see if they need to do anything?  Patient will have feeding tube in for another 2 weeks.

## 2025-05-13 NOTE — TELEPHONE ENCOUNTER
Spoke with Jennie. Provided update from Fabiana. Pt scheduled for Tuesday, May 20th. Will get lab work drawn at the office on Monday. CBCD pended to Brittani Schneider verbalized understanding and had no further questions or concerns at this time.

## 2025-05-13 NOTE — TELEPHONE ENCOUNTER
Spoke with the patient's wife- Jennie. Saw surgeon today. Pt has had feeding tube for 2 weeks. Surgeon OK with full liquid diet. Still no date for surgery. Had lab work drawn as well. States lab work from today: HGB- 10, WBC- 8.06.     Explained I would update Fabiana on this information and call them back only if Fabiana had any additional questions or suggestions or wanted a FUV with the patient. Both patient and Jennie verbalized understanding and had no further questions at this time.

## 2025-05-14 ENCOUNTER — APPOINTMENT (OUTPATIENT)
Dept: RADIOLOGY | Facility: CLINIC | Age: 50
End: 2025-05-14
Payer: COMMERCIAL

## 2025-05-16 ENCOUNTER — HOME CARE VISIT (OUTPATIENT)
Dept: HOME HEALTH SERVICES | Facility: HOME HEALTH | Age: 50
End: 2025-05-16
Payer: COMMERCIAL

## 2025-05-16 PROCEDURE — G0270 MNT SUBS TX FOR CHANGE DX: HCPCS

## 2025-05-16 SDOH — HEALTH STABILITY: MENTAL HEALTH
NUTRITION HISTORY: PT ON NG CONTINUUOUS TF, STARTED WITH CLEAR LIQUIDS, HAS NOW ADVANCED TO SMALL AMOUNTS OF FULL LIQUIDS. PT TOLERATING TF AND FULL LIQUIDS.

## 2025-05-18 NOTE — PROGRESS NOTES
[facility] Clinical Note  Patient Reassessment     Introduction  Brandon Snider IV is a 50 y.o. male who is on the specialty pharmacy service for management of: Oncology Core.      [facility]: zanubrutinib 160 mg (2 x 80 mg) PO twice daily    Duration of therapy: Until drug toxicity or progression    The most recent encounter visit with the referring prescriber Gage Zavala MD on 4/7/25 was reviewed.  Pharmacy will continue to collaborate in the care of this patient with the referring prescriber.    Discussion  Brandon was contacted on 4/7/25 at 1400 for a pharmacy visit with encounter number [id number] from:   [facility] [id number] [facility]   [address] [facility]   [facility] [address]  [facility] [address]  Dept: [phone]  Dept Fax: [phone]  4/ consented to a/an In person visit, which was performed.    Efficacy  Patient has developed new symptoms of condition: No  Patient/caregiver feels medication is affecting the disease state: helping    Goals  Provided education on goals and possible outcomes of therapy:  Adherence with therapy  Timely completion of appropriate labs  Timely and appropriate follow up with provider  Identify and address medication interactions with presciption medications, OTC medications and supplements  Optimize or maintain quality of life  Oncology: Prolong life/No disease progression  Manage side effects (ex: nausea/vomiting, constipation, fatigue) in conjunction with care team  Patient has documented target(s) for goals of therapy: No    Tolerance  Patient has experienced side effects from this medication: Yes - one-time instance of minor rash, resolved.  Recent incr in BP by 10 pts -- pt on antihypertensive meds, follows with PCP -- monitor.  Changes to current therapy regimen: No    The follow-up timeline was discussed. Every person responds to and reacts to therapy differently. Patient should be assessed for efficacy and  tolerability in approximately: 6 months    Adherence  Patient Information  Informant: Self (Patient)  Demonstrates Understanding of Importance of Adherence: Yes  Does the patient have any barriers to self-administration (including physical and mental?): No  Support Network for Adherence: Healthcare Provider, Family Member  Medication Information  Medication: zanubrutinib (Brukinsa)  Patient Reported Missed Doses in the Last 4 Weeks: 1  Estimated Medication Adherence Level: Good  Adherence Estimation Source: Claims history  Barriers to Adherence: No Problems identified   The importance of adherence was discussed and patient/caregiver was advised to take the medication as prescribed by their provider. Encouraged patient/caregiver to call physician's office or specialty pharmacy if they have a question regarding a missed dose.    General Assessment  Changes to home medications, OTCs or supplements: No  Current Medications[1]  Reported new allergies: No  Reported new medical conditions: No  Additional monitoring reviewed: Oncology - CBC-diff:   Lab Results   Component Value Date    WBC 10.6 04/07/2025    RBC 4.68 04/07/2025    HGB 12.8 (L) 04/07/2025    HCT 39.9 (L) 04/07/2025    MCV 85 04/07/2025    MCHC 32.1 04/07/2025     04/07/2025    RDW 14.8 (H) 04/07/2025    NEUTOPHILPCT 51.9 04/07/2025    IGPCT 1.2 (H) 04/07/2025    LYMPHOPCT 37.0 04/07/2025    MONOPCT 6.9 04/07/2025    EOSPCT 2.5 04/07/2025    BASOPCT 0.5 04/07/2025    NEUTROABS 5.50 04/07/2025    LYMPHSABS 3.92 04/07/2025    MONOSABS 0.73 04/07/2025    EOSABS 0.27 04/07/2025    BASOSABS 0.05 04/07/2025    and CMP:   Lab Results   Component Value Date    GLUCOSE 109 (H) 04/07/2025     04/07/2025    K 4.1 04/07/2025     04/07/2025    CO2 29 04/07/2025    ANIONGAP 10 04/07/2025    BUN 9 04/07/2025    CREATININE 0.61 04/07/2025    CALCIUM 9.2 04/07/2025    ALBUMIN 4.5 04/07/2025    ALKPHOS 46 04/07/2025    PROT 6.6 04/07/2025    AST 15  04/07/2025    BILITOT 0.4 04/07/2025    ALT 26 04/07/2025     Is laboratory follow up needed? Yes - routine per clinic    Advised to contact the pharmacy if there are any changes to the patient's medication list, including prescriptions, OTC medications, herbal products, or supplements.    Impression/Plan  This patient has not been identified as high risk due to Lack of high risk qualifiers.  The following action was taken: N/A.         Provided contact information ([phone]) for [facility] and reviewed dispensing process, refill timeline and patient management follow up. Confirmed understanding of education conducted during assessment. All questions and concerns were addressed and patient/caregiver was encouraged to reach out for additional questions or concerns.    Based on the patient's diagnosis, medication list, progress towards goals, adherence, tolerance, and medication list, medication remains appropriate: Therapy remains appropriate (I attest)    Skip Arteaga, [facility], MS, BCOP  Clinical Pharmacy Specialist - Ambulatory Oncology         [1]   Current Outpatient Medications   Medication Sig Dispense Refill    amino ac-protein hydr-whey pro  gram-kcal/30 mL liquid 30 mL by nasogastric tube route 2 times a day. Indications: NUTRITIONAL SUPPLEMENT      amLODIPine (Norvasc) 10 mg tablet Take 1 tablet (10 mg) by mouth once daily. 30 tablet 6    chlorhexidine (Peridex) 0.12 % solution Swish and spit 15 ml for 2 doses, 15mL the night before surgery and 15 ml morning of surgery - swish for 30 seconds -DO NOT SWALLOW, SPIT  mL 0    choline fenofibrate (Trilipix) 135 mg DR capsule TAKE 1 CAPSULE BY MOUTH ONCE DAILY 30 capsule 6    fluticasone (Flonase) 50 mcg/actuation nasal spray Administer 1 spray into each nostril once daily. Shake gently. Before first use, prime pump. After use, clean tip and replace cap.      lisinopril 40 mg tablet Take 1.5 tablets (60 mg) by mouth  once daily. 45 tablet 6    nutritional supplements 0.07 gram-1.5 kcal/mL liquid 80 mL/hr by nasogastric tube route once daily.  nutren1.5 may sub osmolite 1.5 per corpak, 80 ml/hr x 16 hours  100ml flush q 4 hours with clear liquid diet prosource no carb neurtral 2x day additive    Indications: NUTRITIONAL SUPPORT      oxyCODONE (Roxicodone) 5 mg immediate release tablet Take 1 tablet (5 mg) by mouth every 6 hours if needed for severe pain (7 - 10). 5 tablet 0    zanubrutinib (Brukinsa) 80 mg capsule Take 2 capsules (160 mg total) by mouth 2 times a day.  Swallow whole 120 capsule 2     No current facility-administered medications for this visit.

## 2025-05-19 NOTE — PROGRESS NOTES
"  Patient ID: Brandon Snider IV is a 50 y.o. male.  Diagnosis:  CLL/SLL  MedOnc: Dr. Zavala  Primary Care Provider: Bernard Hernandez MD  Referring Provider: No referring provider defined for this encounter.  Visit Type: Follow Up    Date of Service: 05/20/25  [facility]     Patient Care Team:  Bernard Hernandez MD as PCP - General  Bernard Hernandez MD as PCP - [facility] ACO PCP  Gage Zavala MD as Consulting Physician (Hematology and Oncology)    Current Therapy: Zanubrutinib     ONCOLOGIC HISTORY     No matching staging information was found for the patient.    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 pancreatitis, abdominal CT scan showed ascites, no splenomegaly, + lymphadenopathy; treated for H pylori   1/2/2024: EGD-chronic gastritis; negative for H pylori  Continued on Q6 month observation and remained asymptomatic   11/2024: WBC up to 96. Asymptomatic. 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 Started zanubrutinib (Brukinsa) 160 mg PO BID.  1/3/2025: has taken brukinsa for 7 weeks now--has had no ill effects; saw general surgery yesterday--plan is for umbilical hernia repair in March  -I do not recommend that he stop brukinsa delmy-operatively--he has a very rare subtype of CLL (NOTCH1+) which is considered aggressive and patients who go off BTKi can start relapsing within 1-2 weeks; I have advised Ed that he should continue to take brukinsa without interruption  3/7/2025: umbilical hernia repair by Dr Holley - Held " Brukinsa 3 days prior to sx and 3 days after   4/17/2025 - 4/20/2025: [facility] admission for necrotizing pancreatitis   4/21/2025 - 4/23/2025: [facility] admission for fever, ongoing acite pancreatitis, recommending elective gallbladder surgery and to follow up with Dr. Zavala regarding holding BTKi   4/27/2025 - 5/2/2025: [facility] admission for fever, abd pain, jaundice, worsening necrotizing pancreatitis, Corpak placed - continue for 4-6 weeks on discharge, clear liquid diet   Per patient Brukinsa was on hold for about a month during these 3 hospitalizations (4/17 - 5/2)    5/5/2025: Per [facility] - GI Pancreatitis is likely gallstone related, advanced to low fat full liquid, defer choley until pancreatitis resolves   5/14/2025: CT pancreas complete - f/up with GI is scheduled today 5/20/25    Oncology History   CLL (chronic lymphocytic leukemia) (Multi)   1/27/2023 Initial Diagnosis    CLL (chronic lymphocytic leukemia) (Multi)     11/22/2024 -  Chemotherapy    Zanubrutinib, 84 Day Cycles        Other Contributing History  HTN, HLD, NAFLD     Subjective      INTERVAL HISTORY     Brandon Snider IV is a 50 y.o. male who presents today for follow up of CLL/SLL. Patient of Dr. Zavala currently on Brukinsa. He was admitted at [facility] x 3 for necrotizing pancreatitis. Still on tube feed at night for 16 hours with a low fat full liquid diet. He sees GI today to follow up. He is feeling well today. No pain. No N/V/C/D. He denies fevers and signs of infection. He has lost over 20 pounds. Is going to follow up with his PCP regarding his antihypertensives and dosing. He does not check his BP at home. He denies symptoms of hypotension. He has been working from home. Building up his strength, went on his first 1 mile walk since discharge yesterday.     Review of Systems   Constitutional:  Positive for fatigue. Negative for appetite change, chills, diaphoresis, fever and unexpected weight change.        Weight loss expected with concurrent TF plan    HENT:  Negative.     Eyes: Negative.  Negative for icterus.   Respiratory: Negative.  Negative for cough and shortness of breath.    Cardiovascular:  Negative for chest pain and leg swelling.   Gastrointestinal:  Negative for abdominal pain, blood in stool, constipation, diarrhea, nausea and vomiting.   Endocrine: Negative.    Genitourinary: Negative.     Skin: Negative.    Neurological:  Negative for dizziness and light-headedness.   Hematological: Negative.    Psychiatric/Behavioral: Negative.         Objective      /74   Pulse 91   Temp 36.8 °C (98.2 °F)   Resp 18   Wt 104 kg (229 lb 11.5 oz)   SpO2 96%   BMI 32.96 kg/m²   BSA: 2.27 meters squared    Wt Readings from Last 5 Encounters:   05/20/25 104 kg (229 lb 11.5 oz)   05/03/25 109 kg (240 lb)   04/07/25 118 kg (260 lb 12.9 oz)   03/27/25 113 kg (250 lb)   03/12/25 115 kg (254 lb)     Performance Status:  ECOG Score: 1- Restricted in physically strenuous activity.  Carries out light duty.  Karnofsky Score: 80 - Normal activity with effort; some signs or symptoms of disease    PHYSICAL EXAM   Physical Exam  Constitutional:       General: He is not in acute distress.     Appearance: Normal appearance. He is not toxic-appearing.   HENT:      Head: Normocephalic and atraumatic.      Nose:      Comments: Corepak in place   Eyes:      General: No scleral icterus.     Pupils: Pupils are equal, round, and reactive to light.   Pulmonary:      Effort: Pulmonary effort is normal.   Musculoskeletal:         General: Normal range of motion.      Cervical back: Normal range of motion.      Right lower leg: No edema.      Left lower leg: No edema.   Skin:     Coloration: Skin is not jaundiced.   Neurological:      General: No focal deficit present.      Mental Status: He is alert and oriented to person, place, and time.      Motor: No weakness.   Psychiatric:         Mood and Affect: Mood normal.         Behavior: Behavior normal.         Thought Content:  Thought content normal.         Judgment: Judgment normal.       Allergies  RX Allergies[1]   Medications  Current Outpatient Medications   Medication Instructions    amino ac-protein hydr-whey pro  gram-kcal/30 mL liquid 30 mL, 2 times daily    amLODIPine (NORVASC) 10 mg, oral, Daily    Brukinsa 160 mg, oral, 2 times daily, Swallow whole    chlorhexidine (Peridex) 0.12 % solution Swish and spit 15 ml for 2 doses, 15mL the night before surgery and 15 ml morning of surgery - swish for 30 seconds -DO NOT SWALLOW, SPIT OUT    choline fenofibrate (TRILIPIX) 135 mg, oral, Daily    fluticasone (Flonase) 50 mcg/actuation nasal spray 1 spray, Daily    lisinopril 60 mg, oral, Daily    nutritional supplements 80 mL/hr, Daily    oxyCODONE (ROXICODONE) 5 mg, oral, Every 6 hours PRN        Diagnostic Results   RESULTS     Results for orders placed or performed in visit on 05/20/25 (from the past 96 hours)   CBC and Auto Differential   Result Value Ref Range    WBC 13.8 (H) 4.4 - 11.3 x10*3/uL    nRBC      RBC 4.03 (L) 4.50 - 5.90 x10*6/uL    Hemoglobin 10.8 (L) 13.5 - 17.5 g/dL    Hematocrit 34.1 (L) 41.0 - 52.0 %    MCV 85 80 - 100 fL    MCH 26.8 26.0 - 34.0 pg    MCHC 31.7 (L) 32.0 - 36.0 g/dL    RDW 14.5 11.5 - 14.5 %    Platelets 439 150 - 450 x10*3/uL    Immature Granulocytes %, Automated 5.6 (H) 0.0 - 0.9 %    Immature Granulocytes Absolute, Automated 0.78 (H) 0.00 - 0.70 x10*3/uL   Manual Differential   Result Value Ref Range    Neutrophils %, Manual 63.0 40.0 - 80.0 %    Bands %, Manual 1.0 0.0 - 5.0 %    Lymphocytes %, Manual 22.0 13.0 - 44.0 %    Monocytes %, Manual 7.0 2.0 - 10.0 %    Eosinophils %, Manual 0.0 0.0 - 6.0 %    Basophils %, Manual 0.0 0.0 - 2.0 %    Metamyelocytes %, Manual 2.0 0.0 - 0.0 %    Myelocytes %, Manual 5.0 0.0 - 0.0 %    Seg Neutrophils Absolute, Manual 8.69 (H) 1.20 - 7.00 x10*3/uL    Bands Absolute, Manual 0.14 0.00 - 0.70 x10*3/uL    Lymphocytes Absolute, Manual 3.04 1.20 - 4.80  x10*3/uL    Monocytes Absolute, Manual 0.97 0.10 - 1.00 x10*3/uL    Eosinophils Absolute, Manual 0.00 0.00 - 0.70 x10*3/uL    Basophils Absolute, Manual 0.00 0.00 - 0.10 x10*3/uL    Metamyelocytes Absolute, Manual 0.28 0.00 - 0.00 x10*3/uL    Myelocytes Absolute, Manual 0.69 0.00 - 0.00 x10*3/uL    Total Cells Counted 100     Neutrophils Absolute, Manual 8.83 (H) 1.20 - 7.70 x10*3/uL    RBC Morphology See Below     Polychromasia Mild     Ovalocytes Few        Recent Imaging     5/14/2025   IMPRESSION:     1. Changes of resolving necrotizing pancreatitis, with persistent   hypoenhancement of the pancreatic neck.     2.  Interval reduction in dimensions of the heterogeneous peripancreatic   collection, which likely represents walled off necrosis, as described.    However, it now shows multiple locules of air within it.  Etiology for   this can be iatrogenic (please correlate for recent procedures), interval   infection of the collection or  a gastropancreatic fistula.     3.  Other findings, as described.     Assessment/Plan   ASSESSMENT     Brandon Snider IV is a 50 y.o. 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 He remained asymptomatic on observation, but due to the NOTCH1 mutation, 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, for which CLL can progress faster causing accelerating lymphocytosis he was advised to initiate a BTKi. He has been tolerating treatment since.     In March 2025, he underwent umbilical hernia surgery. It was recommended that he continue brukinsa delmy-operatively as he has a very rare subtype of CLL (NOTCH1+) which is considered aggressive and patients who go off BTKi can start relapsing within 1-2 weeks. Surgery completed without complication. He did end up holding his Brukinsa 3 days before and 3 days after surgery as advised by our clinical pharmacist and Dr. Zavala.       In April 2025 he had multiple admissions at [facility] for necrotizing pancreatitis believed to be caused by gallstones in which and outpatient cholecystectomy was advised. A Corepak was placed on discharge. At his outpatient GI follow up on May 5th he was advanced to low fat full liquid and his cholecystectomy is deferred until pancreatitis resolves. His Brunkinsa was held during his multiple admissions. He is back on it now. Today he is doing well. No signs of infection, tolerating TF and full liquid diet without n/v/c/d. He had a CT pancreas on 5/14/2025 and reviews it with the GI surgeon today. He will reach out when the cholecystectomy is planned so we can give instructions on holding. Our clinical pharmacist recommended for a lap choley, hold 3-5 days +/- procedure and if open, hold 7-10 +/- or until wound healed.     His CBC today shows stable excepted WBC of 13.8. Anemia continues to improve since discharge and hemoglobin is 10.8 today. I reviewed his CBCs from his hospitalizations and do not have any concern for relapse of CLL while he was off Brunkinsa. His highest WBC was in the 20s but was associated with an infection. There is no concern at this point that his CLL is not controlled on Brunkinsa and his pancreatitis is not related to treatment OR CLL.     PLAN     # CLL/SLL  - CBC today   - Continue Brukinsa 160 mg BID   - Reach out for guidance on Brunkinsa when GI plans for surgery    Follow up in July as planned with CBC and CMP.     Brandon was seen today for follow-up.  Diagnoses and all orders for this visit:  CLL (chronic lymphocytic leukemia) (Multi) (Primary)    Zanubrutinib, 84 Day Cycles    Patient verbalizes understanding of above plan. Time provided for patient's questions. All questions answered to patient's satisfaction in office. Patient instructed to reach out for any new concerning issues at [phone].    Fabiana Escobar MSN, APRN, A-GNP-C, AOP  [facility]  Division of Hematology & Medical Oncology   [address] [place] [address]  Phone: [phone]  Available via [email or web address] Chat  [email or web address]         [1]   Allergies  Allergen Reactions    Hydrochlorothiazide Other     May have caused pancreatitis 11/2023.

## 2025-05-20 ENCOUNTER — OFFICE VISIT (OUTPATIENT)
Dept: HEMATOLOGY/ONCOLOGY | Facility: CLINIC | Age: 50
End: 2025-05-20
Payer: COMMERCIAL

## 2025-05-20 ENCOUNTER — LAB (OUTPATIENT)
Dept: LAB | Facility: CLINIC | Age: 50
End: 2025-05-20
Payer: COMMERCIAL

## 2025-05-20 VITALS
WEIGHT: 229.72 LBS | RESPIRATION RATE: 18 BRPM | BODY MASS INDEX: 32.96 KG/M2 | HEART RATE: 91 BPM | OXYGEN SATURATION: 96 % | TEMPERATURE: 98.2 F | SYSTOLIC BLOOD PRESSURE: 113 MMHG | DIASTOLIC BLOOD PRESSURE: 74 MMHG

## 2025-05-20 DIAGNOSIS — C91.10 CLL (CHRONIC LYMPHOCYTIC LEUKEMIA) (MULTI): Primary | ICD-10-CM

## 2025-05-20 DIAGNOSIS — C91.10 CLL (CHRONIC LYMPHOCYTIC LEUKEMIA) (MULTI): ICD-10-CM

## 2025-05-20 DIAGNOSIS — D72.829 LEUKOCYTOSIS: Primary | ICD-10-CM

## 2025-05-20 DIAGNOSIS — K86.1 ACUTE ON CHRONIC PANCREATITIS (MULTI): ICD-10-CM

## 2025-05-20 DIAGNOSIS — K85.90 ACUTE ON CHRONIC PANCREATITIS (MULTI): ICD-10-CM

## 2025-05-20 LAB
BASOPHILS # BLD MANUAL: 0 X10*3/UL (ref 0–0.1)
BASOPHILS NFR BLD MANUAL: 0 %
EOSINOPHIL # BLD MANUAL: 0 X10*3/UL (ref 0–0.7)
EOSINOPHIL NFR BLD MANUAL: 0 %
ERYTHROCYTE [DISTWIDTH] IN BLOOD BY AUTOMATED COUNT: 14.5 % (ref 11.5–14.5)
HCT VFR BLD AUTO: 34.1 % (ref 41–52)
HGB BLD-MCNC: 10.8 G/DL (ref 13.5–17.5)
IMM GRANULOCYTES # BLD AUTO: 0.78 X10*3/UL (ref 0–0.7)
IMM GRANULOCYTES NFR BLD AUTO: 5.6 % (ref 0–0.9)
LYMPHOCYTES # BLD MANUAL: 3.04 X10*3/UL (ref 1.2–4.8)
LYMPHOCYTES NFR BLD MANUAL: 22 %
MCH RBC QN AUTO: 26.8 PG (ref 26–34)
MCHC RBC AUTO-ENTMCNC: 31.7 G/DL (ref 32–36)
MCV RBC AUTO: 85 FL (ref 80–100)
METAMYELOCYTES # BLD MANUAL: 0.28 X10*3/UL
METAMYELOCYTES NFR BLD MANUAL: 2 %
MONOCYTES # BLD MANUAL: 0.97 X10*3/UL (ref 0.1–1)
MONOCYTES NFR BLD MANUAL: 7 %
MYELOCYTES # BLD MANUAL: 0.69 X10*3/UL
MYELOCYTES NFR BLD MANUAL: 5 %
NEUTROPHILS # BLD MANUAL: 8.83 X10*3/UL (ref 1.2–7.7)
NEUTS BAND # BLD MANUAL: 0.14 X10*3/UL (ref 0–0.7)
NEUTS BAND NFR BLD MANUAL: 1 %
NEUTS SEG # BLD MANUAL: 8.69 X10*3/UL (ref 1.2–7)
NEUTS SEG NFR BLD MANUAL: 63 %
NRBC BLD-RTO: ABNORMAL /100{WBCS}
OVALOCYTES BLD QL SMEAR: ABNORMAL
PLATELET # BLD AUTO: 439 X10*3/UL (ref 150–450)
POLYCHROMASIA BLD QL SMEAR: ABNORMAL
RBC # BLD AUTO: 4.03 X10*6/UL (ref 4.5–5.9)
RBC MORPH BLD: ABNORMAL
TOTAL CELLS COUNTED BLD: 100
WBC # BLD AUTO: 13.8 X10*3/UL (ref 4.4–11.3)

## 2025-05-20 PROCEDURE — 85027 COMPLETE CBC AUTOMATED: CPT

## 2025-05-20 PROCEDURE — 3078F DIAST BP <80 MM HG: CPT

## 2025-05-20 PROCEDURE — 3074F SYST BP LT 130 MM HG: CPT

## 2025-05-20 PROCEDURE — 36415 COLL VENOUS BLD VENIPUNCTURE: CPT

## 2025-05-20 PROCEDURE — 99214 OFFICE O/P EST MOD 30 MIN: CPT

## 2025-05-20 PROCEDURE — 85007 BL SMEAR W/DIFF WBC COUNT: CPT

## 2025-05-20 ASSESSMENT — ENCOUNTER SYMPTOMS
ENDOCRINE NEGATIVE: 1
SCLERAL ICTERUS: 0
PSYCHIATRIC NEGATIVE: 1
NAUSEA: 0
CONSTIPATION: 0
VOMITING: 0
FATIGUE: 1
APPETITE CHANGE: 0
HEMATOLOGIC/LYMPHATIC NEGATIVE: 1
LEG SWELLING: 0
COUGH: 0
ABDOMINAL PAIN: 0
CHILLS: 0
LIGHT-HEADEDNESS: 0
FEVER: 0
EYES NEGATIVE: 1
RESPIRATORY NEGATIVE: 1
UNEXPECTED WEIGHT CHANGE: 0
BLOOD IN STOOL: 0
SHORTNESS OF BREATH: 0
DIAPHORESIS: 0
DIZZINESS: 0
DIARRHEA: 0

## 2025-05-20 ASSESSMENT — PAIN SCALES - GENERAL: PAINLEVEL_OUTOF10: 0-NO PAIN

## 2025-05-21 ENCOUNTER — HOME CARE VISIT (OUTPATIENT)
Dept: HOME HEALTH SERVICES | Facility: HOME HEALTH | Age: 50
End: 2025-05-21
Payer: COMMERCIAL

## 2025-05-21 ENCOUNTER — TELEPHONE (OUTPATIENT)
Dept: PRIMARY CARE | Facility: CLINIC | Age: 50
End: 2025-05-21
Payer: COMMERCIAL

## 2025-05-21 PROCEDURE — G0321 HH/HSPC RD PHONE VISIT: HCPCS

## 2025-05-21 NOTE — TELEPHONE ENCOUNTER
Per wife Jennie: would like to discuss BP. Pt takes 2 BP meds. Today's BP: 108/73. BP at oncologist office 5/20/25 113/70, and lat week it was 115/66. Pt does not have any dizziness or other symptoms. Jennie said you can see BP readings in pt's chart. Pt has 2 wks left on a feeding tube. They are reintroducing full liquids/proteins. Ed walked a mile today and Monday, and is working with weights. Please call Jennie at [phone]

## 2025-05-22 ENCOUNTER — HOME CARE VISIT (OUTPATIENT)
Dept: HOME HEALTH SERVICES | Facility: HOME HEALTH | Age: 50
End: 2025-05-22
Payer: COMMERCIAL

## 2025-05-22 VITALS
OXYGEN SATURATION: 99 % | SYSTOLIC BLOOD PRESSURE: 101 MMHG | RESPIRATION RATE: 16 BRPM | HEART RATE: 96 BPM | TEMPERATURE: 97.8 F | DIASTOLIC BLOOD PRESSURE: 72 MMHG

## 2025-05-22 PROCEDURE — G0299 HHS/HOSPICE OF RN EA 15 MIN: HCPCS

## 2025-05-22 SDOH — ECONOMIC STABILITY: GENERAL

## 2025-05-22 ASSESSMENT — ENCOUNTER SYMPTOMS
APPETITE LEVEL: FAIR
LAST BOWEL MOVEMENT: 67347
LOWER EXTREMITY EDEMA: 1
BOWEL PATTERN NORMAL: 1
STOOL FREQUENCY: DAILY
PERSON REPORTING PAIN: PATIENT
DENIES PAIN: 1

## 2025-05-22 ASSESSMENT — ACTIVITIES OF DAILY LIVING (ADL): MONEY MANAGEMENT (EXPENSES/BILLS): INDEPENDENT

## 2025-05-23 LAB — HOLD SPECIMEN: NORMAL

## 2025-05-27 ENCOUNTER — HOME CARE VISIT (OUTPATIENT)
Dept: HOME HEALTH SERVICES | Facility: HOME HEALTH | Age: 50
End: 2025-05-27
Payer: COMMERCIAL

## 2025-05-27 PROCEDURE — G0321 HH/HSPC RD PHONE VISIT: HCPCS

## 2025-05-28 ENCOUNTER — HOME CARE VISIT (OUTPATIENT)
Dept: HOME HEALTH SERVICES | Facility: HOME HEALTH | Age: 50
End: 2025-05-28
Payer: COMMERCIAL

## 2025-05-28 VITALS
OXYGEN SATURATION: 98 % | TEMPERATURE: 97.9 F | HEART RATE: 92 BPM | DIASTOLIC BLOOD PRESSURE: 70 MMHG | SYSTOLIC BLOOD PRESSURE: 103 MMHG | RESPIRATION RATE: 16 BRPM

## 2025-05-28 PROCEDURE — G0299 HHS/HOSPICE OF RN EA 15 MIN: HCPCS

## 2025-05-28 SDOH — ECONOMIC STABILITY: GENERAL

## 2025-05-28 ASSESSMENT — ENCOUNTER SYMPTOMS
DENIES PAIN: 1
BOWEL PATTERN NORMAL: 1
CHANGE IN APPETITE: INCREASED
LAST BOWEL MOVEMENT: 67352
STOOL FREQUENCY: DAILY

## 2025-05-28 ASSESSMENT — ACTIVITIES OF DAILY LIVING (ADL)
CURRENT_FUNCTION: STAND BY ASSIST
AMBULATION ASSISTANCE: STAND BY ASSIST
MONEY MANAGEMENT (EXPENSES/BILLS): INDEPENDENT

## 2025-06-04 ENCOUNTER — SPECIALTY PHARMACY (OUTPATIENT)
Dept: PHARMACY | Facility: CLINIC | Age: 50
End: 2025-06-04

## 2025-06-04 ENCOUNTER — HOME CARE VISIT (OUTPATIENT)
Dept: HOME HEALTH SERVICES | Facility: HOME HEALTH | Age: 50
End: 2025-06-04
Payer: COMMERCIAL

## 2025-06-04 VITALS
RESPIRATION RATE: 18 BRPM | DIASTOLIC BLOOD PRESSURE: 85 MMHG | SYSTOLIC BLOOD PRESSURE: 117 MMHG | HEART RATE: 72 BPM | OXYGEN SATURATION: 98 % | TEMPERATURE: 97 F

## 2025-06-04 PROCEDURE — G0321 HH/HSPC RD PHONE VISIT: HCPCS

## 2025-06-04 PROCEDURE — G0299 HHS/HOSPICE OF RN EA 15 MIN: HCPCS

## 2025-06-04 ASSESSMENT — ENCOUNTER SYMPTOMS
DENIES PAIN: 1
PERSON REPORTING PAIN: PATIENT

## 2025-06-04 ASSESSMENT — ACTIVITIES OF DAILY LIVING (ADL)
OASIS_M1830: 00
HOME_HEALTH_OASIS: 00

## 2025-06-12 DIAGNOSIS — Z12.11 SCREENING FOR COLORECTAL CANCER: ICD-10-CM

## 2025-06-12 DIAGNOSIS — Z12.12 SCREENING FOR COLORECTAL CANCER: ICD-10-CM

## 2025-06-13 ENCOUNTER — TELEPHONE (OUTPATIENT)
Dept: HEMATOLOGY/ONCOLOGY | Facility: CLINIC | Age: 50
End: 2025-06-13
Payer: COMMERCIAL

## 2025-06-13 NOTE — TELEPHONE ENCOUNTER
Reason for Conversation  No chief complaint on file.    Background   Patient is having his gallbladder out on 7-7-25.  Please advise how to stop/restart blood thinner    Disposition   No disposition on file.

## 2025-06-15 ENCOUNTER — SPECIALTY PHARMACY (OUTPATIENT)
Dept: PHARMACY | Facility: CLINIC | Age: 50
End: 2025-06-15

## 2025-06-15 PROCEDURE — RXMED WILLOW AMBULATORY MEDICATION CHARGE

## 2025-06-17 ENCOUNTER — PHARMACY VISIT (OUTPATIENT)
Dept: PHARMACY | Facility: CLINIC | Age: 50
End: 2025-06-17
Payer: COMMERCIAL

## 2025-06-18 ENCOUNTER — APPOINTMENT (OUTPATIENT)
Dept: PRIMARY CARE | Facility: CLINIC | Age: 50
End: 2025-06-18
Payer: COMMERCIAL

## 2025-06-18 VITALS
RESPIRATION RATE: 16 BRPM | WEIGHT: 230 LBS | SYSTOLIC BLOOD PRESSURE: 132 MMHG | HEIGHT: 70 IN | DIASTOLIC BLOOD PRESSURE: 86 MMHG | HEART RATE: 70 BPM | TEMPERATURE: 97 F | BODY MASS INDEX: 32.93 KG/M2

## 2025-06-18 DIAGNOSIS — I10 PRIMARY HYPERTENSION: Primary | ICD-10-CM

## 2025-06-18 DIAGNOSIS — F41.1 GENERALIZED ANXIETY DISORDER: ICD-10-CM

## 2025-06-18 DIAGNOSIS — E78.00 HYPERCHOLESTEROLEMIA: ICD-10-CM

## 2025-06-18 PROCEDURE — 3008F BODY MASS INDEX DOCD: CPT | Performed by: FAMILY MEDICINE

## 2025-06-18 PROCEDURE — 3075F SYST BP GE 130 - 139MM HG: CPT | Performed by: FAMILY MEDICINE

## 2025-06-18 PROCEDURE — 1036F TOBACCO NON-USER: CPT | Performed by: FAMILY MEDICINE

## 2025-06-18 PROCEDURE — 3079F DIAST BP 80-89 MM HG: CPT | Performed by: FAMILY MEDICINE

## 2025-06-18 PROCEDURE — 99214 OFFICE O/P EST MOD 30 MIN: CPT | Performed by: FAMILY MEDICINE

## 2025-06-18 RX ORDER — LISINOPRIL 40 MG/1
60 TABLET ORAL DAILY
Qty: 45 TABLET | Refills: 6 | Status: SHIPPED | OUTPATIENT
Start: 2025-06-18

## 2025-06-18 NOTE — ASSESSMENT & PLAN NOTE
Condition well controlled.  No change in current treatment regimen.  Refill given of current medication.  Appropriate labs ordered or reviewed.  Make a follow up appointment with me for recheck in 6 months.     Orders:    lisinopril 40 mg tablet; Take 1.5 tablets (60 mg) by mouth once daily.

## 2025-06-18 NOTE — PROGRESS NOTES
Brandon Snider IV is a 50 y.o. male here today for   Chief Complaint   Patient presents with    Anxiety    Hypertension    Hyperlipidemia    Hospital Follow-up        HPI     HTN recheck -- Patient denies chest pain, SOB, edema, palpitations on review.  Taking medication correctly and denies any side effects.  Amlodipine was dced in hospital bc BP was too low.  Still on Lisinopril 60 mg daily.  40 mg in AM and 20 mg in PM.   Has lost weight with low fat diet after pancreatitis.  He says his home blood pressures have been consistently good 120-130 over 80s.    Scheduled for GB removal July 7th.      Mood disorder recheck -- He is no longer taking SSRI and he feels like things are well controlled.  Weaned down and off medications.              Current Outpatient Medications   Medication Instructions    Brukinsa 160 mg, oral, 2 times daily, Swallow whole    choline fenofibrate (TRILIPIX) 135 mg, oral, Daily    fluticasone (Flonase) 50 mcg/actuation nasal spray 1 spray, Daily    lisinopril 60 mg, oral, Daily       Patient Active Problem List    Diagnosis Date Noted    Idiopathic acute pancreatitis without infection or necrosis ([facility]) 11/28/2023    Other ascites 11/28/2023    Generalized anxiety disorder 01/27/2023    CLL (chronic lymphocytic leukemia) (Multi) 01/27/2023    Hypercholesterolemia 01/27/2023    Primary hypertension 01/27/2023    Acute on chronic pancreatitis (Multi) 05/20/2025    Umbilical hernia without obstruction and without gangrene 12/17/2024    H. pylori infection 12/17/2024    Acute upper respiratory infection 06/17/2024    Obesity, Class II, BMI 35-39.9 11/25/2023    Gastritis 11/24/2023    Generalized abdominal pain 11/23/2023    Abnormal CT scan 11/23/2023    Leukocytosis 07/12/2023    Allergic rhinitis 01/27/2023    Elevated LFTs 01/27/2023    Fatty liver 01/27/2023    Flu 01/27/2023    Lipoma of chest wall 01/27/2023    Mass of chest wall 01/27/2023    Pulmonary nodule 01/27/2023    Class 2  "severe obesity due to excess calories with serious comorbidity and body mass index (BMI) of 38.0 to 38.9 in adult 01/27/2023         Objective      Visit Vitals    Visit Vitals  /86   Pulse 70   Temp 36.1 °C (97 °F)   Resp 16   Ht 1.778 m (5' 10\")   Wt 104 kg (230 lb)   BMI 33.00 kg/m²   Smoking Status Never   BSA 2.27 m²       Body mass index is 33 kg/m².     Physical Exam       General - Not in acute distress and cooperative.  Build & Nutrition - Well developed  Posture - Normal  Gait - Normal  Mental Status - alert and oriented x 3    Head - Normocephalic    Neck - Thyroid normal size    Eyes - Bilateral - Sclera clear and lids pink without edema or mass.      Skin - Warm and dry with no rashes on visible skin    Lungs - Clear to auscultation and normal breathing effort    Cardiovascular - RRR and no murmurs, rubs or thrill.    Peripheral Vascular - Bilateral - no edema present    Neuropsychiatric - normal mood and affect      Assessment & Plan  Primary hypertension  Condition well controlled.  No change in current treatment regimen.  Refill given of current medication.  Appropriate labs ordered or reviewed.  Make a follow up appointment with me for recheck in 6 months.     Orders:    lisinopril 40 mg tablet; Take 1.5 tablets (60 mg) by mouth once daily.    Hypercholesterolemia  Appropriate labs ordered or reviewed.          Generalized anxiety disorder  Condition well controlled with no meds.              Orders Placed This Encounter      lisinopril 40 mg tablet       No orders of the defined types were placed in this encounter.       New Medications Ordered This Visit   Medications    lisinopril 40 mg tablet     Sig: Take 1.5 tablets (60 mg) by mouth once daily.     Dispense:  45 tablet     Refill:  6          "

## 2025-07-04 LAB — NONINV COLON CA DNA+OCC BLD SCRN STL QL: NEGATIVE

## 2025-07-08 ENCOUNTER — APPOINTMENT (OUTPATIENT)
Dept: HEMATOLOGY/ONCOLOGY | Facility: CLINIC | Age: 50
End: 2025-07-08
Payer: COMMERCIAL

## 2025-07-09 ENCOUNTER — APPOINTMENT (OUTPATIENT)
Dept: GASTROENTEROLOGY | Facility: CLINIC | Age: 50
End: 2025-07-09
Payer: COMMERCIAL

## 2025-07-10 PROCEDURE — RXMED WILLOW AMBULATORY MEDICATION CHARGE

## 2025-07-14 ASSESSMENT — ENCOUNTER SYMPTOMS
PSYCHIATRIC NEGATIVE: 1
DIARRHEA: 0
COUGH: 0
UNEXPECTED WEIGHT CHANGE: 0
FEVER: 0
BLOOD IN STOOL: 0
CONSTIPATION: 0
VOMITING: 0
ENDOCRINE NEGATIVE: 1
EYES NEGATIVE: 1
DIAPHORESIS: 0
CHILLS: 0
LEG SWELLING: 0
DIZZINESS: 0
HEMATOLOGIC/LYMPHATIC NEGATIVE: 1
SCLERAL ICTERUS: 0
SHORTNESS OF BREATH: 0
ABDOMINAL PAIN: 0
NAUSEA: 0
APPETITE CHANGE: 0
RESPIRATORY NEGATIVE: 1
LIGHT-HEADEDNESS: 0

## 2025-07-14 NOTE — PROGRESS NOTES
"  Patient ID: Brandon Snider IV is a 50 y.o. male.  Diagnosis:  CLL/SLL  MedOnc: Dr. Zavala  Primary Care Provider: Bernard Hernandez MD  Referring Provider: aGge Zavala MD  [address]  [place] [address]  Visit Type: Follow Up    Date of Service: 07/22/25  Location: [facility]     Patient Care Team:  Brenard Hernandez MD as PCP - General  Bernard Hernandez MD as PCP - [facility] PCP  Gage Zavala MD as Consulting Physician (Hematology and Oncology)    Current Therapy: Zanubrutinib     ONCOLOGIC HISTORY     No matching staging information was found for the patient.    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 pancreatitis, abdominal CT scan showed ascites, no splenomegaly, + lymphadenopathy; treated for H pylori   1/2/2024: EGD-chronic gastritis; negative for H pylori  Continued on Q6 month observation and remained asymptomatic   11/2024: WBC up to 96. Asymptomatic. 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 Started zanubrutinib (Brukinsa) 160 mg PO BID.  1/3/2025: has taken brukinsa for 7 weeks now--has had no ill effects; saw general surgery yesterday--plan is for umbilical hernia repair in March  -I do not recommend that he stop brukinsa delmy-operatively--he has a very rare subtype of CLL (NOTCH1+) which is considered aggressive and patients who go off BTKi can start relapsing within 1-2 weeks; I have advised Ed that he should continue to take brukinsa without interruption  3/7/2025: umbilical hernia repair by Dr" Leydi - Held Brukinsa 3 days prior to sx and 3 days after   4/17/2025 - 4/20/2025: [facility] admission for necrotizing pancreatitis   4/21/2025 - 4/23/2025: [facility] admission for fever, ongoing acite pancreatitis, recommending elective gallbladder surgery and to follow up with Dr. Zavala regarding holding BTKi   4/27/2025 - 5/2/2025: [facility] admission for fever, abd pain, jaundice, worsening necrotizing pancreatitis, Corpak placed - continue for 4-6 weeks on discharge, clear liquid diet   Per patient Brukinsa was on hold for about a month during these 3 hospitalizations (4/17 - 5/2)    5/5/2025: Per [facility] - GI Pancreatitis is likely gallstone related, advanced to low fat full liquid, defer choley until pancreatitis resolves   5/14/2025: CT pancreas complete - f/up with GI is scheduled today 5/20/25 7/7/2025: laparoscopic Choley at [facility] - held Zanubrutinib for 3 days prior and 5 days after      Oncology History   CLL (chronic lymphocytic leukemia) (Multi)   1/27/2023 Initial Diagnosis    CLL (chronic lymphocytic leukemia) (Multi)     11/22/2024 -  Chemotherapy    Zanubrutinib, 84 Day Cycles        Other Contributing History  HTN, HLD, NAFLD     Subjective      INTERVAL HISTORY     Brandon Snider IV is a 50 y.o. male who presents today for follow up of CLL/SLL. Patient of Dr. Zavala currently on Brukinsa. He was admitted at [facility] x 3 for necrotizing pancreatitis, placed on tube feeds and eventually underwent a laparoscopic cholecystectomy which went as expected. He is doing well. Eating and drinking normally. Has restarted his Zanubrutinib without toxicities. No pain, no signs of infection. He has lost over 30 pounds and would like to maintain/continue to lose to maintain a healthy weight.    Review of Systems   Constitutional:  Negative for appetite change, chills, diaphoresis, fatigue, fever and unexpected weight change.   HENT:  Negative.     Eyes: Negative.  Negative for icterus.   Respiratory: Negative.  Negative for cough  and shortness of breath.    Cardiovascular:  Negative for chest pain and leg swelling.   Gastrointestinal:  Negative for abdominal pain, blood in stool, constipation, diarrhea, nausea and vomiting.   Endocrine: Negative.    Genitourinary: Negative.     Skin: Negative.    Neurological:  Negative for dizziness and light-headedness.   Hematological: Negative.    Psychiatric/Behavioral: Negative.       Objective      /85 (BP Location: Right arm, Patient Position: Sitting, BP Cuff Size: Adult long)   Pulse 75   Temp 36.2 °C (97.2 °F) (Temporal)   Resp 17   Wt 106 kg (234 lb 2.1 oz)   SpO2 97%   BMI 33.59 kg/m²   BSA: 2.29 meters squared    Wt Readings from Last 5 Encounters:   07/22/25 106 kg (234 lb 2.1 oz)   06/18/25 104 kg (230 lb)   05/20/25 104 kg (229 lb 11.5 oz)   05/03/25 109 kg (240 lb)   04/07/25 118 kg (260 lb 12.9 oz)     Performance Status:  ECOG Score: 1- Restricted in physically strenuous activity.  Carries out light duty.  Karnofsky Score: 80 - Normal activity with effort; some signs or symptoms of disease    PHYSICAL EXAM   Physical Exam  Constitutional:       General: He is not in acute distress.     Appearance: Normal appearance. He is not toxic-appearing.   HENT:      Head: Normocephalic and atraumatic.     Eyes:      General: No scleral icterus.     Pupils: Pupils are equal, round, and reactive to light.     Pulmonary:      Effort: Pulmonary effort is normal.   Abdominal:      General: There is no distension.      Tenderness: There is no abdominal tenderness.      Comments: 5 lap incisions with dermabond      Musculoskeletal:         General: Normal range of motion.      Cervical back: Normal range of motion.      Right lower leg: No edema.      Left lower leg: No edema.     Skin:     Coloration: Skin is not jaundiced.     Neurological:      General: No focal deficit present.      Mental Status: He is alert and oriented to person, place, and time.      Motor: No weakness.     Psychiatric:          Mood and Affect: Mood normal.         Behavior: Behavior normal.         Thought Content: Thought content normal.         Judgment: Judgment normal.       Allergies  RX Allergies[1]   Medications  Current Outpatient Medications   Medication Instructions    Brukinsa 160 mg, oral, 2 times daily, Swallow whole    choline fenofibrate (TRILIPIX) 135 mg, oral, Daily    fluticasone (Flonase) 50 mcg/actuation nasal spray 1 spray, Daily    lisinopril 60 mg, oral, Daily        Diagnostic Results   RESULTS     Results for orders placed or performed in visit on 07/22/25 (from the past 96 hours)   CBC and Auto Differential   Result Value Ref Range    WBC 10.6 4.4 - 11.3 x10*3/uL    nRBC      RBC 4.75 4.50 - 5.90 x10*6/uL    Hemoglobin 12.3 (L) 13.5 - 17.5 g/dL    Hematocrit 40.0 (L) 41.0 - 52.0 %    MCV 84 80 - 100 fL    MCH 25.9 (L) 26.0 - 34.0 pg    MCHC 30.8 (L) 32.0 - 36.0 g/dL    RDW 15.0 (H) 11.5 - 14.5 %    Platelets 270 150 - 450 x10*3/uL    Neutrophils % 56.2 40.0 - 80.0 %    Immature Granulocytes %, Automated 0.5 0.0 - 0.9 %    Lymphocytes % 33.5 13.0 - 44.0 %    Monocytes % 7.1 2.0 - 10.0 %    Eosinophils % 2.3 0.0 - 6.0 %    Basophils % 0.4 0.0 - 2.0 %    Neutrophils Absolute 5.99 1.20 - 7.70 x10*3/uL    Immature Granulocytes Absolute, Automated 0.05 0.00 - 0.70 x10*3/uL    Lymphocytes Absolute 3.56 1.20 - 4.80 x10*3/uL    Monocytes Absolute 0.75 0.10 - 1.00 x10*3/uL    Eosinophils Absolute 0.24 0.00 - 0.70 x10*3/uL    Basophils Absolute 0.04 0.00 - 0.10 x10*3/uL   Comprehensive metabolic panel   Result Value Ref Range    Glucose 106 (H) 74 - 99 mg/dL    Sodium 137 136 - 145 mmol/L    Potassium 3.9 3.5 - 5.3 mmol/L    Chloride 102 98 - 107 mmol/L    Bicarbonate 29 21 - 32 mmol/L    Anion Gap 10 10 - 20 mmol/L    Urea Nitrogen 12 6 - 23 mg/dL    Creatinine 0.62 0.50 - 1.30 mg/dL    eGFR >90 >60 mL/min/1.73m*2    Calcium 9.5 8.6 - 10.3 mg/dL    Albumin 4.7 3.4 - 5.0 g/dL    Alkaline Phosphatase 57 33 - 120 U/L     Total Protein 7.1 6.4 - 8.2 g/dL    AST 18 9 - 39 U/L    Bilirubin, Total 0.5 0.0 - 1.2 mg/dL    ALT 22 10 - 52 U/L       Assessment/Plan   ASSESSMENT     Brandon Snider IV is a 50 y.o. 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 He remained asymptomatic on observation, but due to the NOTCH1 mutation, 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, for which CLL can progress faster causing accelerating lymphocytosis he was advised to initiate a BTKi. He has been tolerating treatment since.     In March 2025, he underwent umbilical hernia surgery. It was recommended that he continue brukinsa delmy-operatively as he has a very rare subtype of CLL (NOTCH1+) which is considered aggressive and patients who go off BTKi can start relapsing within 1-2 weeks. Surgery completed without complication. He did end up holding his Brukinsa 3 days before and 3 days after surgery as advised by our clinical pharmacist and Dr. Zavala.      In April 2025 he had multiple admissions at [facility] for necrotizing pancreatitis believed to be caused by gallstones. His Brukinsa was held during his multiple admissions. I reviewed his CBCs from his hospitalizations and do not have any concern for relapse of CLL while he was off Brunkinsa. He underwent a laparoscopic cholecystectomy on 7/7/25 without incident. Doing well. Held Brukinsa 3 days prior to surgery and 5 days after. Today he is doing well. No signs of infection. Laparoscopic incisions are healing.     His CBC today is stable. WBC of 10.6. H&H 12.3/40.0. He should continue Brukinsa as prescribed and we will see him in 3 months with lab work.      PLAN     # CLL/SLL   - Continue Brukinsa 160 mg BID   - CBC and CMP every 3 months     Follow up in 3 months, labs same day.     Brandon was seen today for follow-up.  Diagnoses and all orders for this visit:  CLL (chronic lymphocytic  leukemia) (Multi)  -     CBC and Auto Differential; Future  -     Comprehensive Metabolic Panel; Future  -     Clinic Appointment Request Follow Up; JUAN FRANCISCO ALDRIDGE; [facility]; Future  -     Clinic Appointment Request Follow Up; LANCE CLEARY; [facility]      Zanubrutinib, 84 Day Cycles    Patient verbalizes understanding of above plan. Time provided for patient's questions. All questions answered to patient's satisfaction in office. Patient instructed to reach out for any new concerning issues at [phone].    Lance Cleary MSN, APRN, A-GNP-C, AOCNP  [facility]  Division of Hematology & Medical Oncology   [address] [place] [address]  Phone: [phone]  Available via [employer] Secure Chat  [email or web address]       [1]   Allergies  Allergen Reactions    Hydrochlorothiazide Other     May have caused pancreatitis 11/2023.

## 2025-07-15 ENCOUNTER — SPECIALTY PHARMACY (OUTPATIENT)
Dept: PHARMACY | Facility: CLINIC | Age: 50
End: 2025-07-15

## 2025-07-17 ENCOUNTER — PHARMACY VISIT (OUTPATIENT)
Dept: PHARMACY | Facility: CLINIC | Age: 50
End: 2025-07-17
Payer: COMMERCIAL

## 2025-07-22 ENCOUNTER — LAB (OUTPATIENT)
Dept: LAB | Facility: CLINIC | Age: 50
End: 2025-07-22
Payer: COMMERCIAL

## 2025-07-22 ENCOUNTER — OFFICE VISIT (OUTPATIENT)
Dept: HEMATOLOGY/ONCOLOGY | Facility: CLINIC | Age: 50
End: 2025-07-22
Payer: COMMERCIAL

## 2025-07-22 VITALS
BODY MASS INDEX: 33.59 KG/M2 | OXYGEN SATURATION: 97 % | RESPIRATION RATE: 17 BRPM | TEMPERATURE: 97.2 F | SYSTOLIC BLOOD PRESSURE: 127 MMHG | WEIGHT: 234.13 LBS | HEART RATE: 75 BPM | DIASTOLIC BLOOD PRESSURE: 85 MMHG

## 2025-07-22 DIAGNOSIS — C91.10 CLL (CHRONIC LYMPHOCYTIC LEUKEMIA) (MULTI): ICD-10-CM

## 2025-07-22 LAB
ALBUMIN SERPL BCP-MCNC: 4.7 G/DL (ref 3.4–5)
ALP SERPL-CCNC: 57 U/L (ref 33–120)
ALT SERPL W P-5'-P-CCNC: 22 U/L (ref 10–52)
ANION GAP SERPL CALC-SCNC: 10 MMOL/L (ref 10–20)
AST SERPL W P-5'-P-CCNC: 18 U/L (ref 9–39)
BASOPHILS # BLD AUTO: 0.04 X10*3/UL (ref 0–0.1)
BASOPHILS NFR BLD AUTO: 0.4 %
BILIRUB SERPL-MCNC: 0.5 MG/DL (ref 0–1.2)
BUN SERPL-MCNC: 12 MG/DL (ref 6–23)
CALCIUM SERPL-MCNC: 9.5 MG/DL (ref 8.6–10.3)
CHLORIDE SERPL-SCNC: 102 MMOL/L (ref 98–107)
CO2 SERPL-SCNC: 29 MMOL/L (ref 21–32)
CREAT SERPL-MCNC: 0.62 MG/DL (ref 0.5–1.3)
EGFRCR SERPLBLD CKD-EPI 2021: >90 ML/MIN/1.73M*2
EOSINOPHIL # BLD AUTO: 0.24 X10*3/UL (ref 0–0.7)
EOSINOPHIL NFR BLD AUTO: 2.3 %
ERYTHROCYTE [DISTWIDTH] IN BLOOD BY AUTOMATED COUNT: 15 % (ref 11.5–14.5)
GLUCOSE SERPL-MCNC: 106 MG/DL (ref 74–99)
HCT VFR BLD AUTO: 40 % (ref 41–52)
HGB BLD-MCNC: 12.3 G/DL (ref 13.5–17.5)
IMM GRANULOCYTES # BLD AUTO: 0.05 X10*3/UL (ref 0–0.7)
IMM GRANULOCYTES NFR BLD AUTO: 0.5 % (ref 0–0.9)
LYMPHOCYTES # BLD AUTO: 3.56 X10*3/UL (ref 1.2–4.8)
LYMPHOCYTES NFR BLD AUTO: 33.5 %
MCH RBC QN AUTO: 25.9 PG (ref 26–34)
MCHC RBC AUTO-ENTMCNC: 30.8 G/DL (ref 32–36)
MCV RBC AUTO: 84 FL (ref 80–100)
MONOCYTES # BLD AUTO: 0.75 X10*3/UL (ref 0.1–1)
MONOCYTES NFR BLD AUTO: 7.1 %
NEUTROPHILS # BLD AUTO: 5.99 X10*3/UL (ref 1.2–7.7)
NEUTROPHILS NFR BLD AUTO: 56.2 %
NRBC BLD-RTO: ABNORMAL /100{WBCS}
PLATELET # BLD AUTO: 270 X10*3/UL (ref 150–450)
POTASSIUM SERPL-SCNC: 3.9 MMOL/L (ref 3.5–5.3)
PROT SERPL-MCNC: 7.1 G/DL (ref 6.4–8.2)
RBC # BLD AUTO: 4.75 X10*6/UL (ref 4.5–5.9)
SODIUM SERPL-SCNC: 137 MMOL/L (ref 136–145)
WBC # BLD AUTO: 10.6 X10*3/UL (ref 4.4–11.3)

## 2025-07-22 PROCEDURE — 99213 OFFICE O/P EST LOW 20 MIN: CPT

## 2025-07-22 PROCEDURE — 3079F DIAST BP 80-89 MM HG: CPT

## 2025-07-22 PROCEDURE — 36415 COLL VENOUS BLD VENIPUNCTURE: CPT

## 2025-07-22 PROCEDURE — 84075 ASSAY ALKALINE PHOSPHATASE: CPT

## 2025-07-22 PROCEDURE — 3074F SYST BP LT 130 MM HG: CPT

## 2025-07-22 PROCEDURE — 85025 COMPLETE CBC W/AUTO DIFF WBC: CPT

## 2025-07-22 ASSESSMENT — PAIN SCALES - GENERAL: PAINLEVEL_OUTOF10: 0-NO PAIN

## 2025-07-22 ASSESSMENT — ENCOUNTER SYMPTOMS: FATIGUE: 0

## 2025-08-01 ENCOUNTER — SPECIALTY PHARMACY (OUTPATIENT)
Dept: PHARMACY | Facility: CLINIC | Age: 50
End: 2025-08-01

## 2025-08-01 DIAGNOSIS — C91.10 CLL (CHRONIC LYMPHOCYTIC LEUKEMIA) (MULTI): ICD-10-CM

## 2025-08-17 ENCOUNTER — SPECIALTY PHARMACY (OUTPATIENT)
Dept: PHARMACY | Facility: CLINIC | Age: 50
End: 2025-08-17

## 2025-08-17 PROCEDURE — RXMED WILLOW AMBULATORY MEDICATION CHARGE

## 2025-08-19 ENCOUNTER — PHARMACY VISIT (OUTPATIENT)
Dept: PHARMACY | Facility: CLINIC | Age: 50
End: 2025-08-19
Payer: COMMERCIAL

## 2025-12-22 ENCOUNTER — APPOINTMENT (OUTPATIENT)
Dept: PRIMARY CARE | Facility: CLINIC | Age: 50
End: 2025-12-22
Payer: COMMERCIAL

## (undated) DEVICE — APPLICATOR, CHLORAPREP, W/ORANGE TINT, 26ML

## (undated) DEVICE — SOLUTION, IRRIGATION, SODIUM CHLORIDE 0.9%, 1000 ML, POUR BOTTLE

## (undated) DEVICE — GLOVE, SURGICAL, BIOGEL, 6, PF, LATEX, GREEN

## (undated) DEVICE — Device

## (undated) DEVICE — ADHESIVE, SKIN, DERMABOND ADVANCED, 15CM, PEN-STYLE

## (undated) DEVICE — SUTURE, PROLENE, 1, 30 IN, CT-1, BLUE

## (undated) DEVICE — SUTURE, VICRYL, 3-0, 27 IN, SH

## (undated) DEVICE — TOWEL PACK, STERILE, 4/PACK, BLUE

## (undated) DEVICE — SUTURE, VICRYL, 2-0, 27 IN, SH, UNDYED

## (undated) DEVICE — SUTURE, MONOCRYL, 4-0, 27 IN, PS-2, UNDYED